# Patient Record
Sex: MALE | Race: WHITE | NOT HISPANIC OR LATINO | Employment: FULL TIME | URBAN - METROPOLITAN AREA
[De-identification: names, ages, dates, MRNs, and addresses within clinical notes are randomized per-mention and may not be internally consistent; named-entity substitution may affect disease eponyms.]

---

## 2017-02-17 ENCOUNTER — TELEPHONE (OUTPATIENT)
Dept: NEUROSURGERY | Facility: CLINIC | Age: 47
End: 2017-02-17

## 2017-03-16 ENCOUNTER — TELEPHONE (OUTPATIENT)
Dept: NEUROSURGERY | Facility: CLINIC | Age: 47
End: 2017-03-16

## 2017-03-17 ENCOUNTER — OFFICE VISIT (OUTPATIENT)
Dept: NEUROSURGERY | Facility: CLINIC | Age: 47
End: 2017-03-17

## 2017-03-17 VITALS
BODY MASS INDEX: 32.45 KG/M2 | OXYGEN SATURATION: 98 % | SYSTOLIC BLOOD PRESSURE: 122 MMHG | DIASTOLIC BLOOD PRESSURE: 80 MMHG | WEIGHT: 226.7 LBS | HEART RATE: 75 BPM | HEIGHT: 70 IN | TEMPERATURE: 98.1 F | RESPIRATION RATE: 18 BRPM

## 2017-03-17 DIAGNOSIS — M47.812 SPONDYLOSIS OF CERVICAL REGION WITHOUT MYELOPATHY OR RADICULOPATHY: ICD-10-CM

## 2017-03-17 DIAGNOSIS — G56.01 CARPAL TUNNEL SYNDROME OF RIGHT WRIST: Primary | ICD-10-CM

## 2017-03-17 PROCEDURE — 99213 OFFICE O/P EST LOW 20 MIN: CPT | Performed by: PHYSICIAN ASSISTANT

## 2017-03-17 RX ORDER — FLUTICASONE PROPIONATE 50 MCG
2 SPRAY, SUSPENSION (ML) NASAL AS NEEDED
COMMUNITY
End: 2020-12-22

## 2017-03-20 DIAGNOSIS — M47.812 SPONDYLOSIS OF CERVICAL REGION WITHOUT MYELOPATHY OR RADICULOPATHY: Primary | ICD-10-CM

## 2017-03-21 ENCOUNTER — OFFICE VISIT (OUTPATIENT)
Dept: NEUROSURGERY | Facility: CLINIC | Age: 47
End: 2017-03-21

## 2017-03-21 ENCOUNTER — HOSPITAL ENCOUNTER (OUTPATIENT)
Dept: INTERVENTIONAL RADIOLOGY/VASCULAR | Facility: HOSPITAL | Age: 47
Discharge: HOME | End: 2017-03-21
Attending: NEUROLOGICAL SURGERY | Admitting: NEUROLOGICAL SURGERY

## 2017-03-21 ENCOUNTER — APPOINTMENT (OUTPATIENT)
Dept: CT IMAGING | Facility: HOSPITAL | Age: 47
End: 2017-03-21

## 2017-03-21 VITALS — BODY MASS INDEX: 32.78 KG/M2 | HEIGHT: 70 IN | WEIGHT: 229 LBS | TEMPERATURE: 98 F

## 2017-03-21 VITALS
WEIGHT: 229 LBS | DIASTOLIC BLOOD PRESSURE: 88 MMHG | OXYGEN SATURATION: 97 % | HEIGHT: 70 IN | TEMPERATURE: 97.8 F | SYSTOLIC BLOOD PRESSURE: 138 MMHG | RESPIRATION RATE: 18 BRPM | BODY MASS INDEX: 32.78 KG/M2 | HEART RATE: 78 BPM

## 2017-03-21 DIAGNOSIS — M47.812 SPONDYLOSIS OF CERVICAL REGION WITHOUT MYELOPATHY OR RADICULOPATHY: ICD-10-CM

## 2017-03-21 DIAGNOSIS — M47.22 OSTEOARTHRITIS OF SPINE WITH RADICULOPATHY, CERVICAL REGION: Primary | ICD-10-CM

## 2017-03-21 PROCEDURE — 72240 MYELOGRAPHY NECK SPINE: CPT

## 2017-03-21 PROCEDURE — 0 IOPAMIDOL 61 % SOLUTION: Performed by: NEUROLOGICAL SURGERY

## 2017-03-21 PROCEDURE — 72125 CT NECK SPINE W/O DYE: CPT

## 2017-03-21 PROCEDURE — 99213 OFFICE O/P EST LOW 20 MIN: CPT | Performed by: NEUROLOGICAL SURGERY

## 2017-03-21 RX ORDER — ACETAMINOPHEN 500 MG
500 TABLET ORAL EVERY 4 HOURS PRN
Status: CANCELLED | OUTPATIENT
Start: 2017-03-21

## 2017-03-21 RX ORDER — MAGNESIUM HYDROXIDE/ALUMINUM HYDROXICE/SIMETHICONE 120; 1200; 1200 MG/30ML; MG/30ML; MG/30ML
30 SUSPENSION ORAL ONCE AS NEEDED
Status: CANCELLED | OUTPATIENT
Start: 2017-03-21

## 2017-03-21 RX ORDER — HYDROCODONE BITARTRATE AND ACETAMINOPHEN 10; 325 MG/1; MG/1
1 TABLET ORAL 2 TIMES DAILY PRN
Qty: 60 TABLET | Refills: 0 | Status: SHIPPED | OUTPATIENT
Start: 2017-03-21 | End: 2017-05-15

## 2017-03-21 RX ORDER — NAPROXEN SODIUM 220 MG
220 TABLET ORAL 2 TIMES DAILY PRN
COMMUNITY
End: 2020-12-22

## 2017-03-21 RX ORDER — PROMETHAZINE HYDROCHLORIDE 25 MG/ML
25 INJECTION, SOLUTION INTRAMUSCULAR; INTRAVENOUS ONCE AS NEEDED
Status: CANCELLED | OUTPATIENT
Start: 2017-03-21

## 2017-03-21 RX ORDER — ONDANSETRON 4 MG/1
4 TABLET, FILM COATED ORAL ONCE AS NEEDED
Status: CANCELLED | OUTPATIENT
Start: 2017-03-21

## 2017-03-21 RX ORDER — PROMETHAZINE HYDROCHLORIDE 25 MG/1
25 TABLET ORAL ONCE AS NEEDED
Status: CANCELLED | OUTPATIENT
Start: 2017-03-21

## 2017-03-21 RX ORDER — LIDOCAINE HYDROCHLORIDE 10 MG/ML
5 INJECTION, SOLUTION INFILTRATION; PERINEURAL ONCE
Status: COMPLETED | OUTPATIENT
Start: 2017-03-21 | End: 2017-03-21

## 2017-03-21 RX ADMIN — LIDOCAINE HYDROCHLORIDE 5 ML: 10 INJECTION, SOLUTION INFILTRATION; PERINEURAL at 07:41

## 2017-03-21 RX ADMIN — IOPAMIDOL 15 ML: 612 INJECTION, SOLUTION INTRATHECAL at 07:41

## 2017-03-22 ENCOUNTER — TELEPHONE (OUTPATIENT)
Dept: INFUSION THERAPY | Facility: HOSPITAL | Age: 47
End: 2017-03-22

## 2017-04-21 ENCOUNTER — OFFICE VISIT (OUTPATIENT)
Dept: NEUROSURGERY | Facility: CLINIC | Age: 47
End: 2017-04-21

## 2017-04-21 VITALS — BODY MASS INDEX: 32.64 KG/M2 | TEMPERATURE: 97.9 F | WEIGHT: 228 LBS | HEIGHT: 70 IN

## 2017-04-21 DIAGNOSIS — M47.22 CERVICAL SPONDYLOSIS WITH RADICULOPATHY: Primary | ICD-10-CM

## 2017-04-21 PROCEDURE — 99213 OFFICE O/P EST LOW 20 MIN: CPT | Performed by: NEUROLOGICAL SURGERY

## 2017-04-26 ENCOUNTER — TELEPHONE (OUTPATIENT)
Dept: NEUROSURGERY | Facility: CLINIC | Age: 47
End: 2017-04-26

## 2017-05-02 ENCOUNTER — DOCUMENTATION (OUTPATIENT)
Dept: NEUROSURGERY | Facility: CLINIC | Age: 47
End: 2017-05-02

## 2017-05-02 ENCOUNTER — PREP FOR SURGERY (OUTPATIENT)
Dept: OTHER | Facility: HOSPITAL | Age: 47
End: 2017-05-02

## 2017-05-02 DIAGNOSIS — M47.22 CERVICAL SPONDYLOSIS WITH RADICULOPATHY: Primary | ICD-10-CM

## 2017-05-02 RX ORDER — SODIUM CHLORIDE 0.9 % (FLUSH) 0.9 %
1-10 SYRINGE (ML) INJECTION AS NEEDED
Status: CANCELLED | OUTPATIENT
Start: 2017-05-02

## 2017-05-10 ENCOUNTER — TELEPHONE (OUTPATIENT)
Dept: NEUROSURGERY | Facility: CLINIC | Age: 47
End: 2017-05-10

## 2017-05-15 ENCOUNTER — APPOINTMENT (OUTPATIENT)
Dept: PREADMISSION TESTING | Facility: HOSPITAL | Age: 47
End: 2017-05-15

## 2017-05-15 VITALS — WEIGHT: 229.5 LBS | HEIGHT: 70 IN | BODY MASS INDEX: 32.86 KG/M2

## 2017-05-15 LAB
DEPRECATED RDW RBC AUTO: 49.2 FL (ref 37–54)
ERYTHROCYTE [DISTWIDTH] IN BLOOD BY AUTOMATED COUNT: 17.1 % (ref 11.3–14.5)
HBA1C MFR BLD: 5 % (ref 4.8–5.6)
HCT VFR BLD AUTO: 36.5 % (ref 38.9–50.9)
HGB BLD-MCNC: 11.3 G/DL (ref 13.1–17.5)
MCH RBC QN AUTO: 24.5 PG (ref 27–31)
MCHC RBC AUTO-ENTMCNC: 31 G/DL (ref 32–36)
MCV RBC AUTO: 79 FL (ref 80–99)
PLATELET # BLD AUTO: 248 10*3/MM3 (ref 150–450)
PMV BLD AUTO: 9.5 FL (ref 6–12)
RBC # BLD AUTO: 4.62 10*6/MM3 (ref 4.2–5.76)
WBC NRBC COR # BLD: 4.91 10*3/MM3 (ref 3.5–10.8)

## 2017-05-15 PROCEDURE — 93010 ELECTROCARDIOGRAM REPORT: CPT | Performed by: INTERNAL MEDICINE

## 2017-05-17 ENCOUNTER — ANESTHESIA EVENT (OUTPATIENT)
Dept: PERIOP | Facility: HOSPITAL | Age: 47
End: 2017-05-17

## 2017-05-17 LAB — MRSA SPEC QL CULT: NORMAL

## 2017-05-18 ENCOUNTER — HOSPITAL ENCOUNTER (INPATIENT)
Facility: HOSPITAL | Age: 47
LOS: 1 days | Discharge: HOME | End: 2017-05-19
Attending: NEUROLOGICAL SURGERY | Admitting: NEUROLOGICAL SURGERY

## 2017-05-18 ENCOUNTER — ANESTHESIA (OUTPATIENT)
Dept: PERIOP | Facility: HOSPITAL | Age: 47
End: 2017-05-18

## 2017-05-18 ENCOUNTER — APPOINTMENT (OUTPATIENT)
Dept: GENERAL RADIOLOGY | Facility: HOSPITAL | Age: 47
End: 2017-05-18

## 2017-05-18 DIAGNOSIS — M47.22 CERVICAL SPONDYLOSIS WITH RADICULOPATHY: ICD-10-CM

## 2017-05-18 PROCEDURE — 25010000002 HYDROMORPHONE PER 4 MG: Performed by: NURSE ANESTHETIST, CERTIFIED REGISTERED

## 2017-05-18 PROCEDURE — 25010000002 PROPOFOL 10 MG/ML EMULSION: Performed by: NURSE ANESTHETIST, CERTIFIED REGISTERED

## 2017-05-18 PROCEDURE — 25010000002 VANCOMYCIN: Performed by: NEUROLOGICAL SURGERY

## 2017-05-18 PROCEDURE — 25010000002 MORPHINE PER 10 MG: Performed by: NEUROLOGICAL SURGERY

## 2017-05-18 PROCEDURE — 22551 ARTHRD ANT NTRBDY CERVICAL: CPT | Performed by: NEUROLOGICAL SURGERY

## 2017-05-18 PROCEDURE — 25010000002 FENTANYL CITRATE (PF) 100 MCG/2ML SOLUTION: Performed by: NURSE ANESTHETIST, CERTIFIED REGISTERED

## 2017-05-18 PROCEDURE — 0RG20K0 FUSION OF 2 OR MORE CERVICAL VERTEBRAL JOINTS WITH NONAUTOLOGOUS TISSUE SUBSTITUTE, ANTERIOR APPROACH, ANTERIOR COLUMN, OPEN APPROACH: ICD-10-PCS | Performed by: NEUROLOGICAL SURGERY

## 2017-05-18 PROCEDURE — 0RB30ZZ EXCISION OF CERVICAL VERTEBRAL DISC, OPEN APPROACH: ICD-10-PCS | Performed by: NEUROLOGICAL SURGERY

## 2017-05-18 PROCEDURE — 25010000002 DEXAMETHASONE PER 1 MG: Performed by: NEUROLOGICAL SURGERY

## 2017-05-18 PROCEDURE — 25010000002 VANCOMYCIN HCL IN NACL 1.5-0.9 GM/500ML-% SOLUTION: Performed by: NEUROLOGICAL SURGERY

## 2017-05-18 PROCEDURE — 25010000002 MEPERIDINE PER 100 MG: Performed by: NURSE ANESTHETIST, CERTIFIED REGISTERED

## 2017-05-18 PROCEDURE — 22846 INSERT SPINE FIXATION DEVICE: CPT | Performed by: NEUROLOGICAL SURGERY

## 2017-05-18 PROCEDURE — 25010000002 ONDANSETRON PER 1 MG: Performed by: NURSE ANESTHETIST, CERTIFIED REGISTERED

## 2017-05-18 PROCEDURE — 22552 ARTHRD ANT NTRBD CERVICAL EA: CPT | Performed by: NEUROLOGICAL SURGERY

## 2017-05-18 PROCEDURE — 76001 HC FLUORO GREATER THAN 1 HOUR: CPT

## 2017-05-18 PROCEDURE — 25010000002 NEOSTIGMINE 10 MG/10ML SOLUTION: Performed by: NURSE ANESTHETIST, CERTIFIED REGISTERED

## 2017-05-18 PROCEDURE — 20931 SP BONE ALGRFT STRUCT ADD-ON: CPT | Performed by: NEUROLOGICAL SURGERY

## 2017-05-18 PROCEDURE — C1713 ANCHOR/SCREW BN/BN,TIS/BN: HCPCS | Performed by: NEUROLOGICAL SURGERY

## 2017-05-18 PROCEDURE — 76000 FLUOROSCOPY <1 HR PHYS/QHP: CPT

## 2017-05-18 DEVICE — BONE LORDOTIC ASR 5X14X11 FZD: Type: IMPLANTABLE DEVICE | Site: SPINE CERVICAL | Status: FUNCTIONAL

## 2017-05-18 DEVICE — SCREW 7723513 ZEVO VAR ST 3.5MM X 13MM
Type: IMPLANTABLE DEVICE | Site: SPINE CERVICAL | Status: FUNCTIONAL
Brand: ZEVO™ ANTERIOR CERVICAL PLATE SYSTEM

## 2017-05-18 DEVICE — BONE LORDOTIC ASR 6X14X11 FZD: Type: IMPLANTABLE DEVICE | Site: SPINE CERVICAL | Status: FUNCTIONAL

## 2017-05-18 RX ORDER — PROPOFOL 10 MG/ML
VIAL (ML) INTRAVENOUS AS NEEDED
Status: DISCONTINUED | OUTPATIENT
Start: 2017-05-18 | End: 2017-05-18 | Stop reason: SURG

## 2017-05-18 RX ORDER — IPRATROPIUM BROMIDE AND ALBUTEROL SULFATE 2.5; .5 MG/3ML; MG/3ML
3 SOLUTION RESPIRATORY (INHALATION) ONCE AS NEEDED
Status: DISCONTINUED | OUTPATIENT
Start: 2017-05-18 | End: 2017-05-18 | Stop reason: HOSPADM

## 2017-05-18 RX ORDER — PROMETHAZINE HYDROCHLORIDE 25 MG/ML
6.25 INJECTION, SOLUTION INTRAMUSCULAR; INTRAVENOUS ONCE AS NEEDED
Status: DISCONTINUED | OUTPATIENT
Start: 2017-05-18 | End: 2017-05-18 | Stop reason: HOSPADM

## 2017-05-18 RX ORDER — NALOXONE HCL 0.4 MG/ML
0.4 VIAL (ML) INJECTION AS NEEDED
Status: DISCONTINUED | OUTPATIENT
Start: 2017-05-18 | End: 2017-05-18 | Stop reason: HOSPADM

## 2017-05-18 RX ORDER — MORPHINE SULFATE 2 MG/ML
1 INJECTION, SOLUTION INTRAMUSCULAR; INTRAVENOUS EVERY 4 HOURS PRN
Status: DISCONTINUED | OUTPATIENT
Start: 2017-05-18 | End: 2017-05-19 | Stop reason: HOSPADM

## 2017-05-18 RX ORDER — ACETAMINOPHEN 325 MG/1
650 TABLET ORAL EVERY 4 HOURS PRN
Status: DISCONTINUED | OUTPATIENT
Start: 2017-05-18 | End: 2017-05-19 | Stop reason: HOSPADM

## 2017-05-18 RX ORDER — NALOXONE HCL 0.4 MG/ML
0.4 VIAL (ML) INJECTION
Status: DISCONTINUED | OUTPATIENT
Start: 2017-05-18 | End: 2017-05-19 | Stop reason: HOSPADM

## 2017-05-18 RX ORDER — BISACODYL 5 MG/1
5 TABLET, DELAYED RELEASE ORAL DAILY PRN
Status: DISCONTINUED | OUTPATIENT
Start: 2017-05-18 | End: 2017-05-19 | Stop reason: HOSPADM

## 2017-05-18 RX ORDER — PANTOPRAZOLE SODIUM 40 MG/1
40 TABLET, DELAYED RELEASE ORAL
Status: DISCONTINUED | OUTPATIENT
Start: 2017-05-19 | End: 2017-05-19 | Stop reason: HOSPADM

## 2017-05-18 RX ORDER — DIPHENHYDRAMINE HCL 25 MG
25 CAPSULE ORAL NIGHTLY PRN
Status: DISCONTINUED | OUTPATIENT
Start: 2017-05-18 | End: 2017-05-19 | Stop reason: HOSPADM

## 2017-05-18 RX ORDER — FENTANYL CITRATE 50 UG/ML
50 INJECTION, SOLUTION INTRAMUSCULAR; INTRAVENOUS
Status: DISCONTINUED | OUTPATIENT
Start: 2017-05-18 | End: 2017-05-18 | Stop reason: HOSPADM

## 2017-05-18 RX ORDER — ROCURONIUM BROMIDE 10 MG/ML
INJECTION, SOLUTION INTRAVENOUS AS NEEDED
Status: DISCONTINUED | OUTPATIENT
Start: 2017-05-18 | End: 2017-05-18 | Stop reason: SURG

## 2017-05-18 RX ORDER — FAMOTIDINE 20 MG/1
20 TABLET, FILM COATED ORAL ONCE
Status: COMPLETED | OUTPATIENT
Start: 2017-05-18 | End: 2017-05-18

## 2017-05-18 RX ORDER — HYDROMORPHONE HYDROCHLORIDE 1 MG/ML
0.5 INJECTION, SOLUTION INTRAMUSCULAR; INTRAVENOUS; SUBCUTANEOUS
Status: DISCONTINUED | OUTPATIENT
Start: 2017-05-18 | End: 2017-05-18 | Stop reason: HOSPADM

## 2017-05-18 RX ORDER — OXYCODONE AND ACETAMINOPHEN 7.5; 325 MG/1; MG/1
1 TABLET ORAL ONCE AS NEEDED
Status: DISCONTINUED | OUTPATIENT
Start: 2017-05-18 | End: 2017-05-18 | Stop reason: HOSPADM

## 2017-05-18 RX ORDER — HYDROCODONE BITARTRATE AND ACETAMINOPHEN 7.5; 325 MG/1; MG/1
1 TABLET ORAL EVERY 4 HOURS PRN
Status: DISCONTINUED | OUTPATIENT
Start: 2017-05-18 | End: 2017-05-19 | Stop reason: HOSPADM

## 2017-05-18 RX ORDER — MEPERIDINE HYDROCHLORIDE 25 MG/ML
12.5 INJECTION INTRAMUSCULAR; INTRAVENOUS; SUBCUTANEOUS
Status: DISCONTINUED | OUTPATIENT
Start: 2017-05-18 | End: 2017-05-18 | Stop reason: HOSPADM

## 2017-05-18 RX ORDER — SODIUM CHLORIDE 0.9 % (FLUSH) 0.9 %
1-10 SYRINGE (ML) INJECTION AS NEEDED
Status: DISCONTINUED | OUTPATIENT
Start: 2017-05-18 | End: 2017-05-18

## 2017-05-18 RX ORDER — FAMOTIDINE 10 MG/ML
20 INJECTION, SOLUTION INTRAVENOUS ONCE
Status: DISCONTINUED | OUTPATIENT
Start: 2017-05-18 | End: 2017-05-18

## 2017-05-18 RX ORDER — LIDOCAINE HYDROCHLORIDE 10 MG/ML
INJECTION, SOLUTION EPIDURAL; INFILTRATION; INTRACAUDAL; PERINEURAL AS NEEDED
Status: DISCONTINUED | OUTPATIENT
Start: 2017-05-18 | End: 2017-05-18 | Stop reason: SURG

## 2017-05-18 RX ORDER — SODIUM CHLORIDE 0.9 % (FLUSH) 0.9 %
1-10 SYRINGE (ML) INJECTION AS NEEDED
Status: DISCONTINUED | OUTPATIENT
Start: 2017-05-18 | End: 2017-05-19 | Stop reason: HOSPADM

## 2017-05-18 RX ORDER — BISACODYL 10 MG
10 SUPPOSITORY, RECTAL RECTAL DAILY PRN
Status: DISCONTINUED | OUTPATIENT
Start: 2017-05-18 | End: 2017-05-19 | Stop reason: HOSPADM

## 2017-05-18 RX ORDER — LIDOCAINE HYDROCHLORIDE 10 MG/ML
0.5 INJECTION, SOLUTION EPIDURAL; INFILTRATION; INTRACAUDAL; PERINEURAL ONCE AS NEEDED
Status: COMPLETED | OUTPATIENT
Start: 2017-05-18 | End: 2017-05-18

## 2017-05-18 RX ORDER — HYDRALAZINE HYDROCHLORIDE 20 MG/ML
5 INJECTION INTRAMUSCULAR; INTRAVENOUS
Status: DISCONTINUED | OUTPATIENT
Start: 2017-05-18 | End: 2017-05-18 | Stop reason: HOSPADM

## 2017-05-18 RX ORDER — SODIUM CHLORIDE 0.9 % (FLUSH) 0.9 %
1-10 SYRINGE (ML) INJECTION AS NEEDED
Status: DISCONTINUED | OUTPATIENT
Start: 2017-05-18 | End: 2017-05-18 | Stop reason: HOSPADM

## 2017-05-18 RX ORDER — ONDANSETRON 2 MG/ML
4 INJECTION INTRAMUSCULAR; INTRAVENOUS ONCE AS NEEDED
Status: DISCONTINUED | OUTPATIENT
Start: 2017-05-18 | End: 2017-05-18 | Stop reason: HOSPADM

## 2017-05-18 RX ORDER — VANCOMYCIN/0.9 % SOD CHLORIDE 1.5G/250ML
15 PLASTIC BAG, INJECTION (ML) INTRAVENOUS EVERY 12 HOURS
Status: COMPLETED | OUTPATIENT
Start: 2017-05-18 | End: 2017-05-18

## 2017-05-18 RX ORDER — DOCUSATE SODIUM 100 MG/1
100 CAPSULE, LIQUID FILLED ORAL 2 TIMES DAILY PRN
Status: DISCONTINUED | OUTPATIENT
Start: 2017-05-18 | End: 2017-05-19 | Stop reason: HOSPADM

## 2017-05-18 RX ORDER — MAGNESIUM HYDROXIDE 1200 MG/15ML
LIQUID ORAL AS NEEDED
Status: DISCONTINUED | OUTPATIENT
Start: 2017-05-18 | End: 2017-05-18 | Stop reason: HOSPADM

## 2017-05-18 RX ORDER — NEOSTIGMINE METHYLSULFATE 1 MG/ML
INJECTION, SOLUTION INTRAVENOUS AS NEEDED
Status: DISCONTINUED | OUTPATIENT
Start: 2017-05-18 | End: 2017-05-18 | Stop reason: SURG

## 2017-05-18 RX ORDER — FENTANYL CITRATE 50 UG/ML
INJECTION, SOLUTION INTRAMUSCULAR; INTRAVENOUS AS NEEDED
Status: DISCONTINUED | OUTPATIENT
Start: 2017-05-18 | End: 2017-05-18 | Stop reason: SURG

## 2017-05-18 RX ORDER — PROMETHAZINE HYDROCHLORIDE 25 MG/1
25 SUPPOSITORY RECTAL ONCE AS NEEDED
Status: DISCONTINUED | OUTPATIENT
Start: 2017-05-18 | End: 2017-05-18 | Stop reason: HOSPADM

## 2017-05-18 RX ORDER — SODIUM CHLORIDE, SODIUM LACTATE, POTASSIUM CHLORIDE, CALCIUM CHLORIDE 600; 310; 30; 20 MG/100ML; MG/100ML; MG/100ML; MG/100ML
75 INJECTION, SOLUTION INTRAVENOUS CONTINUOUS
Status: DISCONTINUED | OUTPATIENT
Start: 2017-05-18 | End: 2017-05-19 | Stop reason: HOSPADM

## 2017-05-18 RX ORDER — ONDANSETRON 2 MG/ML
4 INJECTION INTRAMUSCULAR; INTRAVENOUS EVERY 6 HOURS PRN
Status: DISCONTINUED | OUTPATIENT
Start: 2017-05-18 | End: 2017-05-19 | Stop reason: HOSPADM

## 2017-05-18 RX ORDER — SENNA AND DOCUSATE SODIUM 50; 8.6 MG/1; MG/1
1 TABLET, FILM COATED ORAL NIGHTLY PRN
Status: DISCONTINUED | OUTPATIENT
Start: 2017-05-18 | End: 2017-05-19 | Stop reason: HOSPADM

## 2017-05-18 RX ORDER — SODIUM CHLORIDE, SODIUM LACTATE, POTASSIUM CHLORIDE, CALCIUM CHLORIDE 600; 310; 30; 20 MG/100ML; MG/100ML; MG/100ML; MG/100ML
9 INJECTION, SOLUTION INTRAVENOUS CONTINUOUS
Status: DISCONTINUED | OUTPATIENT
Start: 2017-05-18 | End: 2017-05-19 | Stop reason: HOSPADM

## 2017-05-18 RX ORDER — ONDANSETRON 2 MG/ML
INJECTION INTRAMUSCULAR; INTRAVENOUS AS NEEDED
Status: DISCONTINUED | OUTPATIENT
Start: 2017-05-18 | End: 2017-05-18 | Stop reason: SURG

## 2017-05-18 RX ORDER — PROMETHAZINE HYDROCHLORIDE 25 MG/1
25 TABLET ORAL ONCE AS NEEDED
Status: DISCONTINUED | OUTPATIENT
Start: 2017-05-18 | End: 2017-05-18 | Stop reason: HOSPADM

## 2017-05-18 RX ORDER — FLUTICASONE PROPIONATE 50 MCG
2 SPRAY, SUSPENSION (ML) NASAL AS NEEDED
Status: DISCONTINUED | OUTPATIENT
Start: 2017-05-18 | End: 2017-05-19 | Stop reason: HOSPADM

## 2017-05-18 RX ORDER — IBUPROFEN 600 MG/1
600 TABLET ORAL EVERY 8 HOURS SCHEDULED
Status: DISCONTINUED | OUTPATIENT
Start: 2017-05-18 | End: 2017-05-19 | Stop reason: HOSPADM

## 2017-05-18 RX ORDER — OXYCODONE HYDROCHLORIDE AND ACETAMINOPHEN 5; 325 MG/1; MG/1
1 TABLET ORAL ONCE AS NEEDED
Status: DISCONTINUED | OUTPATIENT
Start: 2017-05-18 | End: 2017-05-18 | Stop reason: HOSPADM

## 2017-05-18 RX ORDER — GLYCOPYRROLATE 0.2 MG/ML
INJECTION INTRAMUSCULAR; INTRAVENOUS AS NEEDED
Status: DISCONTINUED | OUTPATIENT
Start: 2017-05-18 | End: 2017-05-18 | Stop reason: SURG

## 2017-05-18 RX ORDER — MORPHINE SULFATE 4 MG/ML
4 INJECTION, SOLUTION INTRAMUSCULAR; INTRAVENOUS EVERY 4 HOURS PRN
Status: DISCONTINUED | OUTPATIENT
Start: 2017-05-18 | End: 2017-05-19 | Stop reason: HOSPADM

## 2017-05-18 RX ORDER — LABETALOL HYDROCHLORIDE 5 MG/ML
5 INJECTION, SOLUTION INTRAVENOUS
Status: DISCONTINUED | OUTPATIENT
Start: 2017-05-18 | End: 2017-05-18 | Stop reason: HOSPADM

## 2017-05-18 RX ADMIN — DEXAMETHASONE SODIUM PHOSPHATE 10 MG: 4 INJECTION, SOLUTION INTRAMUSCULAR; INTRAVENOUS at 07:24

## 2017-05-18 RX ADMIN — ROCURONIUM BROMIDE 45 MG: 10 INJECTION INTRAVENOUS at 07:24

## 2017-05-18 RX ADMIN — FENTANYL CITRATE 50 MCG: 50 INJECTION, SOLUTION INTRAMUSCULAR; INTRAVENOUS at 09:50

## 2017-05-18 RX ADMIN — ROBINUL 0.2 MG: 0.2 INJECTION INTRAMUSCULAR; INTRAVENOUS at 10:10

## 2017-05-18 RX ADMIN — VANCOMYCIN HYDROCHLORIDE 1500 MG: 1 INJECTION, POWDER, LYOPHILIZED, FOR SOLUTION INTRAVENOUS at 19:07

## 2017-05-18 RX ADMIN — SODIUM CHLORIDE, POTASSIUM CHLORIDE, SODIUM LACTATE AND CALCIUM CHLORIDE 75 ML/HR: 600; 310; 30; 20 INJECTION, SOLUTION INTRAVENOUS at 19:06

## 2017-05-18 RX ADMIN — HYDROCODONE BITARTRATE AND ACETAMINOPHEN 1 TABLET: 7.5; 325 TABLET ORAL at 16:32

## 2017-05-18 RX ADMIN — SODIUM CHLORIDE, POTASSIUM CHLORIDE, SODIUM LACTATE AND CALCIUM CHLORIDE 9 ML/HR: 600; 310; 30; 20 INJECTION, SOLUTION INTRAVENOUS at 06:10

## 2017-05-18 RX ADMIN — HYDROCODONE BITARTRATE AND ACETAMINOPHEN 1 TABLET: 7.5; 325 TABLET ORAL at 11:30

## 2017-05-18 RX ADMIN — FENTANYL CITRATE 50 MCG: 50 INJECTION INTRAMUSCULAR; INTRAVENOUS at 11:55

## 2017-05-18 RX ADMIN — FENTANYL CITRATE 50 MCG: 50 INJECTION, SOLUTION INTRAMUSCULAR; INTRAVENOUS at 07:24

## 2017-05-18 RX ADMIN — LIDOCAINE HYDROCHLORIDE 0.5 ML: 10 INJECTION, SOLUTION EPIDURAL; INFILTRATION; INTRACAUDAL; PERINEURAL at 06:10

## 2017-05-18 RX ADMIN — ONDANSETRON 4 MG: 2 INJECTION INTRAMUSCULAR; INTRAVENOUS at 10:10

## 2017-05-18 RX ADMIN — MEPERIDINE HYDROCHLORIDE 12.5 MG: 25 INJECTION, SOLUTION INTRAMUSCULAR; INTRAVENOUS; SUBCUTANEOUS at 10:35

## 2017-05-18 RX ADMIN — HYDROMORPHONE HYDROCHLORIDE 0.5 MG: 1 INJECTION, SOLUTION INTRAMUSCULAR; INTRAVENOUS; SUBCUTANEOUS at 10:55

## 2017-05-18 RX ADMIN — SODIUM CHLORIDE, POTASSIUM CHLORIDE, SODIUM LACTATE AND CALCIUM CHLORIDE 75 ML/HR: 600; 310; 30; 20 INJECTION, SOLUTION INTRAVENOUS at 10:43

## 2017-05-18 RX ADMIN — FENTANYL CITRATE 50 MCG: 50 INJECTION INTRAMUSCULAR; INTRAVENOUS at 14:15

## 2017-05-18 RX ADMIN — IBUPROFEN 600 MG: 600 TABLET, FILM COATED ORAL at 16:32

## 2017-05-18 RX ADMIN — NEOSTIGMINE METHYLSULFATE 1 MG: 1 INJECTION, SOLUTION INTRAVENOUS at 10:10

## 2017-05-18 RX ADMIN — PROPOFOL 160 MG: 10 INJECTION, EMULSION INTRAVENOUS at 07:24

## 2017-05-18 RX ADMIN — HYDROMORPHONE HYDROCHLORIDE 0.5 MG: 1 INJECTION, SOLUTION INTRAMUSCULAR; INTRAVENOUS; SUBCUTANEOUS at 10:45

## 2017-05-18 RX ADMIN — FENTANYL CITRATE 50 MCG: 50 INJECTION, SOLUTION INTRAMUSCULAR; INTRAVENOUS at 08:05

## 2017-05-18 RX ADMIN — HYDROCODONE BITARTRATE AND ACETAMINOPHEN 1 TABLET: 7.5; 325 TABLET ORAL at 20:22

## 2017-05-18 RX ADMIN — HYDROMORPHONE HYDROCHLORIDE 0.5 MG: 1 INJECTION, SOLUTION INTRAMUSCULAR; INTRAVENOUS; SUBCUTANEOUS at 11:15

## 2017-05-18 RX ADMIN — IBUPROFEN 600 MG: 600 TABLET, FILM COATED ORAL at 20:22

## 2017-05-18 RX ADMIN — MORPHINE SULFATE 4 MG: 4 INJECTION, SOLUTION INTRAMUSCULAR; INTRAVENOUS at 16:32

## 2017-05-18 RX ADMIN — VANCOMYCIN HYDROCHLORIDE 1500 MG: 1 INJECTION, POWDER, LYOPHILIZED, FOR SOLUTION INTRAVENOUS at 06:50

## 2017-05-18 RX ADMIN — FENTANYL CITRATE 50 MCG: 50 INJECTION INTRAMUSCULAR; INTRAVENOUS at 11:40

## 2017-05-18 RX ADMIN — LIDOCAINE HYDROCHLORIDE 40 MG: 10 INJECTION, SOLUTION EPIDURAL; INFILTRATION; INTRACAUDAL; PERINEURAL at 07:24

## 2017-05-18 RX ADMIN — HYDROMORPHONE HYDROCHLORIDE 0.5 MG: 1 INJECTION, SOLUTION INTRAMUSCULAR; INTRAVENOUS; SUBCUTANEOUS at 11:05

## 2017-05-18 RX ADMIN — FAMOTIDINE 20 MG: 20 TABLET ORAL at 06:09

## 2017-05-19 VITALS
HEART RATE: 68 BPM | RESPIRATION RATE: 16 BRPM | HEIGHT: 70 IN | SYSTOLIC BLOOD PRESSURE: 141 MMHG | BODY MASS INDEX: 32.78 KG/M2 | DIASTOLIC BLOOD PRESSURE: 93 MMHG | TEMPERATURE: 97.8 F | WEIGHT: 229 LBS | OXYGEN SATURATION: 97 %

## 2017-05-19 DIAGNOSIS — Z98.1 STATUS POST CERVICAL SPINAL FUSION: Primary | ICD-10-CM

## 2017-05-19 PROCEDURE — 99024 POSTOP FOLLOW-UP VISIT: CPT | Performed by: PHYSICIAN ASSISTANT

## 2017-05-19 RX ORDER — HYDROCODONE BITARTRATE AND ACETAMINOPHEN 10; 325 MG/1; MG/1
1 TABLET ORAL 3 TIMES DAILY PRN
Qty: 50 TABLET | Refills: 0 | Status: SHIPPED | OUTPATIENT
Start: 2017-05-19 | End: 2017-06-09

## 2017-05-19 RX ADMIN — IBUPROFEN 600 MG: 600 TABLET, FILM COATED ORAL at 05:19

## 2017-05-19 RX ADMIN — PANTOPRAZOLE SODIUM 40 MG: 40 TABLET, DELAYED RELEASE ORAL at 05:19

## 2017-06-05 ENCOUNTER — TELEPHONE (OUTPATIENT)
Dept: NEUROSURGERY | Facility: CLINIC | Age: 47
End: 2017-06-05

## 2017-06-09 ENCOUNTER — TRANSCRIBE ORDERS (OUTPATIENT)
Dept: GENERAL RADIOLOGY | Facility: HOSPITAL | Age: 47
End: 2017-06-09

## 2017-06-09 ENCOUNTER — OFFICE VISIT (OUTPATIENT)
Dept: NEUROSURGERY | Facility: CLINIC | Age: 47
End: 2017-06-09

## 2017-06-09 ENCOUNTER — APPOINTMENT (OUTPATIENT)
Dept: GENERAL RADIOLOGY | Facility: HOSPITAL | Age: 47
End: 2017-06-09

## 2017-06-09 ENCOUNTER — TELEPHONE (OUTPATIENT)
Dept: NEUROSURGERY | Facility: CLINIC | Age: 47
End: 2017-06-09

## 2017-06-09 ENCOUNTER — HOSPITAL ENCOUNTER (OUTPATIENT)
Dept: GENERAL RADIOLOGY | Facility: HOSPITAL | Age: 47
Discharge: HOME | End: 2017-06-09
Admitting: PHYSICIAN ASSISTANT

## 2017-06-09 VITALS — HEIGHT: 70 IN | TEMPERATURE: 98.4 F | WEIGHT: 227 LBS | BODY MASS INDEX: 32.5 KG/M2

## 2017-06-09 DIAGNOSIS — Z98.1 STATUS POST CERVICAL SPINAL FUSION: Primary | ICD-10-CM

## 2017-06-09 DIAGNOSIS — Z98.1 S/P SPINAL FUSION: Primary | ICD-10-CM

## 2017-06-09 DIAGNOSIS — M47.22 CERVICAL SPONDYLOSIS WITH RADICULOPATHY: Primary | ICD-10-CM

## 2017-06-09 DIAGNOSIS — Z98.1 STATUS POST CERVICAL SPINAL FUSION: ICD-10-CM

## 2017-06-09 DIAGNOSIS — Z98.1 S/P SPINAL FUSION: ICD-10-CM

## 2017-06-09 PROCEDURE — 72040 X-RAY EXAM NECK SPINE 2-3 VW: CPT

## 2017-06-09 PROCEDURE — 99024 POSTOP FOLLOW-UP VISIT: CPT | Performed by: PHYSICIAN ASSISTANT

## 2017-07-21 ENCOUNTER — OFFICE VISIT (OUTPATIENT)
Dept: NEUROSURGERY | Facility: CLINIC | Age: 47
End: 2017-07-21

## 2017-07-21 VITALS — WEIGHT: 227 LBS | BODY MASS INDEX: 32.5 KG/M2 | TEMPERATURE: 97.6 F | HEIGHT: 70 IN

## 2017-07-21 DIAGNOSIS — M47.22 CERVICAL SPONDYLOSIS WITH RADICULOPATHY: ICD-10-CM

## 2017-07-21 DIAGNOSIS — Z98.1 STATUS POST CERVICAL SPINAL FUSION: Primary | ICD-10-CM

## 2017-07-21 PROCEDURE — 99024 POSTOP FOLLOW-UP VISIT: CPT | Performed by: NEUROLOGICAL SURGERY

## 2020-11-15 ENCOUNTER — APPOINTMENT (OUTPATIENT)
Dept: PREADMISSION TESTING | Facility: HOSPITAL | Age: 50
End: 2020-11-15

## 2020-12-22 ENCOUNTER — OFFICE VISIT (OUTPATIENT)
Dept: NEUROSURGERY | Facility: CLINIC | Age: 50
End: 2020-12-22

## 2020-12-22 VITALS — HEIGHT: 70 IN | BODY MASS INDEX: 34.36 KG/M2 | WEIGHT: 240 LBS | TEMPERATURE: 98 F | RESPIRATION RATE: 16 BRPM

## 2020-12-22 DIAGNOSIS — R20.0 NUMBNESS OF RIGHT HAND: ICD-10-CM

## 2020-12-22 DIAGNOSIS — G56.01 CARPAL TUNNEL SYNDROME OF RIGHT WRIST: Primary | ICD-10-CM

## 2020-12-22 DIAGNOSIS — M47.22 CERVICAL SPONDYLOSIS WITH RADICULOPATHY: ICD-10-CM

## 2020-12-22 DIAGNOSIS — R20.0 BILATERAL LEG NUMBNESS: ICD-10-CM

## 2020-12-22 DIAGNOSIS — Z98.1 STATUS POST CERVICAL SPINAL FUSION: ICD-10-CM

## 2020-12-22 PROCEDURE — 99204 OFFICE O/P NEW MOD 45 MIN: CPT | Performed by: PHYSICIAN ASSISTANT

## 2020-12-26 ENCOUNTER — HOSPITAL ENCOUNTER (OUTPATIENT)
Dept: MRI IMAGING | Facility: HOSPITAL | Age: 50
Discharge: HOME | End: 2020-12-26
Admitting: PHYSICIAN ASSISTANT

## 2020-12-26 DIAGNOSIS — M47.22 CERVICAL SPONDYLOSIS WITH RADICULOPATHY: ICD-10-CM

## 2020-12-26 DIAGNOSIS — Z98.1 STATUS POST CERVICAL SPINAL FUSION: ICD-10-CM

## 2020-12-26 PROCEDURE — 72148 MRI LUMBAR SPINE W/O DYE: CPT

## 2021-01-06 ENCOUNTER — OFFICE VISIT (OUTPATIENT)
Dept: NEUROSURGERY | Facility: CLINIC | Age: 51
End: 2021-01-06

## 2021-01-06 VITALS
DIASTOLIC BLOOD PRESSURE: 86 MMHG | BODY MASS INDEX: 33.64 KG/M2 | HEIGHT: 70 IN | SYSTOLIC BLOOD PRESSURE: 142 MMHG | TEMPERATURE: 97.7 F | WEIGHT: 235 LBS

## 2021-01-06 DIAGNOSIS — R20.0 BILATERAL LEG NUMBNESS: ICD-10-CM

## 2021-01-06 DIAGNOSIS — G56.01 CARPAL TUNNEL SYNDROME OF RIGHT WRIST: ICD-10-CM

## 2021-01-06 DIAGNOSIS — Z98.1 STATUS POST CERVICAL SPINAL FUSION: ICD-10-CM

## 2021-01-06 DIAGNOSIS — M48.062 SPINAL STENOSIS, LUMBAR REGION, WITH NEUROGENIC CLAUDICATION: Primary | ICD-10-CM

## 2021-01-06 DIAGNOSIS — R20.0 NUMBNESS OF RIGHT HAND: ICD-10-CM

## 2021-01-06 DIAGNOSIS — M47.22 CERVICAL SPONDYLOSIS WITH RADICULOPATHY: ICD-10-CM

## 2021-01-06 PROCEDURE — 99214 OFFICE O/P EST MOD 30 MIN: CPT | Performed by: PHYSICIAN ASSISTANT

## 2021-01-11 ENCOUNTER — TELEPHONE (OUTPATIENT)
Dept: NEUROSURGERY | Facility: CLINIC | Age: 51
End: 2021-01-11

## 2021-01-26 ENCOUNTER — HOSPITAL ENCOUNTER (OUTPATIENT)
Dept: CT IMAGING | Facility: HOSPITAL | Age: 51
Discharge: HOME | End: 2021-01-26

## 2021-01-26 ENCOUNTER — HOSPITAL ENCOUNTER (OUTPATIENT)
Dept: GENERAL RADIOLOGY | Facility: HOSPITAL | Age: 51
Discharge: HOME | End: 2021-01-26

## 2021-01-26 VITALS
TEMPERATURE: 96.8 F | OXYGEN SATURATION: 95 % | RESPIRATION RATE: 18 BRPM | HEART RATE: 89 BPM | DIASTOLIC BLOOD PRESSURE: 72 MMHG | WEIGHT: 236 LBS | BODY MASS INDEX: 33.86 KG/M2 | SYSTOLIC BLOOD PRESSURE: 133 MMHG

## 2021-01-26 DIAGNOSIS — M48.062 SPINAL STENOSIS, LUMBAR REGION WITH NEUROGENIC CLAUDICATION: ICD-10-CM

## 2021-01-26 DIAGNOSIS — M48.062 SPINAL STENOSIS, LUMBAR REGION, WITH NEUROGENIC CLAUDICATION: ICD-10-CM

## 2021-01-26 PROCEDURE — 62304 MYELOGRAPHY LUMBAR INJECTION: CPT

## 2021-01-26 PROCEDURE — 62284 INJECTION FOR MYELOGRAM: CPT | Performed by: NEUROLOGICAL SURGERY

## 2021-01-26 PROCEDURE — 72120 X-RAY BEND ONLY L-S SPINE: CPT

## 2021-01-26 PROCEDURE — 0 IOPAMIDOL 41 % SOLUTION: Performed by: PHYSICIAN ASSISTANT

## 2021-01-26 PROCEDURE — 72240 MYELOGRAPHY NECK SPINE: CPT

## 2021-01-26 PROCEDURE — 72132 CT LUMBAR SPINE W/DYE: CPT

## 2021-01-26 RX ORDER — PROMETHAZINE HYDROCHLORIDE 25 MG/1
25 TABLET ORAL ONCE AS NEEDED
Status: DISCONTINUED | OUTPATIENT
Start: 2021-01-26 | End: 2021-01-27 | Stop reason: HOSPADM

## 2021-01-26 RX ORDER — LIDOCAINE HYDROCHLORIDE 10 MG/ML
5 INJECTION, SOLUTION EPIDURAL; INFILTRATION; INTRACAUDAL; PERINEURAL ONCE
Status: COMPLETED | OUTPATIENT
Start: 2021-01-26 | End: 2021-01-26

## 2021-01-26 RX ORDER — ONDANSETRON 4 MG/1
4 TABLET, FILM COATED ORAL ONCE AS NEEDED
Status: DISCONTINUED | OUTPATIENT
Start: 2021-01-26 | End: 2021-01-27 | Stop reason: HOSPADM

## 2021-01-26 RX ADMIN — IOPAMIDOL 20 ML: 408 INJECTION, SOLUTION INTRATHECAL at 07:06

## 2021-01-26 RX ADMIN — LIDOCAINE HYDROCHLORIDE 5 ML: 10 INJECTION, SOLUTION EPIDURAL; INFILTRATION; INTRACAUDAL; PERINEURAL at 07:01

## 2021-01-27 ENCOUNTER — TELEPHONE (OUTPATIENT)
Dept: INFUSION THERAPY | Facility: HOSPITAL | Age: 51
End: 2021-01-27

## 2021-01-27 ENCOUNTER — OFFICE VISIT (OUTPATIENT)
Dept: NEUROSURGERY | Facility: CLINIC | Age: 51
End: 2021-01-27

## 2021-01-27 VITALS — WEIGHT: 240.2 LBS | HEART RATE: 97 BPM | BODY MASS INDEX: 34.39 KG/M2 | HEIGHT: 70 IN

## 2021-01-27 DIAGNOSIS — G56.03 BILATERAL CARPAL TUNNEL SYNDROME: ICD-10-CM

## 2021-01-27 DIAGNOSIS — M47.22 CERVICAL SPONDYLOSIS WITH RADICULOPATHY: ICD-10-CM

## 2021-01-27 DIAGNOSIS — M48.062 SPINAL STENOSIS, LUMBAR REGION, WITH NEUROGENIC CLAUDICATION: Primary | ICD-10-CM

## 2021-01-27 PROCEDURE — 99214 OFFICE O/P EST MOD 30 MIN: CPT | Performed by: NEUROLOGICAL SURGERY

## 2021-01-29 ENCOUNTER — TELEPHONE (OUTPATIENT)
Dept: NEUROSURGERY | Facility: CLINIC | Age: 51
End: 2021-01-29

## 2021-01-29 ENCOUNTER — PREP FOR SURGERY (OUTPATIENT)
Dept: OTHER | Facility: HOSPITAL | Age: 51
End: 2021-01-29

## 2021-01-29 DIAGNOSIS — M48.062 LUMBAR STENOSIS WITH NEUROGENIC CLAUDICATION: Primary | ICD-10-CM

## 2021-01-29 RX ORDER — IBUPROFEN 800 MG/1
800 TABLET ORAL ONCE
Status: CANCELLED | OUTPATIENT
Start: 2021-01-29 | End: 2021-01-29

## 2021-01-29 RX ORDER — ACETAMINOPHEN 500 MG
1000 TABLET ORAL ONCE
Status: CANCELLED | OUTPATIENT
Start: 2021-01-29 | End: 2021-01-29

## 2021-01-29 RX ORDER — OXYCODONE HCL 10 MG/1
10 TABLET, FILM COATED, EXTENDED RELEASE ORAL ONCE
Status: CANCELLED | OUTPATIENT
Start: 2021-01-29 | End: 2021-01-29

## 2021-01-29 RX ORDER — FAMOTIDINE 20 MG/1
20 TABLET, FILM COATED ORAL
Status: CANCELLED | OUTPATIENT
Start: 2021-01-29

## 2021-02-03 ENCOUNTER — TELEPHONE (OUTPATIENT)
Dept: NEUROSURGERY | Facility: CLINIC | Age: 51
End: 2021-02-03

## 2021-02-08 ENCOUNTER — TELEPHONE (OUTPATIENT)
Dept: NEUROSURGERY | Facility: CLINIC | Age: 51
End: 2021-02-08

## 2021-02-09 ENCOUNTER — APPOINTMENT (OUTPATIENT)
Dept: PREADMISSION TESTING | Facility: HOSPITAL | Age: 51
End: 2021-02-09

## 2021-02-09 VITALS — WEIGHT: 239.8 LBS | HEIGHT: 70 IN | BODY MASS INDEX: 34.33 KG/M2

## 2021-02-09 LAB
DEPRECATED RDW RBC AUTO: 46.1 FL (ref 37–54)
ERYTHROCYTE [DISTWIDTH] IN BLOOD BY AUTOMATED COUNT: 13.5 % (ref 12.3–15.4)
HBA1C MFR BLD: 4.9 % (ref 4.8–5.6)
HCT VFR BLD AUTO: 43.6 % (ref 37.5–51)
HGB BLD-MCNC: 13.8 G/DL (ref 13–17.7)
MCH RBC QN AUTO: 29.2 PG (ref 26.6–33)
MCHC RBC AUTO-ENTMCNC: 31.7 G/DL (ref 31.5–35.7)
MCV RBC AUTO: 92.2 FL (ref 79–97)
MRSA DNA SPEC QL NAA+PROBE: NEGATIVE
PLATELET # BLD AUTO: 156 10*3/MM3 (ref 140–450)
PMV BLD AUTO: 11 FL (ref 6–12)
QT INTERVAL: 394 MS
QTC INTERVAL: 422 MS
RBC # BLD AUTO: 4.73 10*6/MM3 (ref 4.14–5.8)
SARS-COV-2 RNA RESP QL NAA+PROBE: NOT DETECTED
WBC # BLD AUTO: 5.35 10*3/MM3 (ref 3.4–10.8)

## 2021-02-09 PROCEDURE — 83036 HEMOGLOBIN GLYCOSYLATED A1C: CPT

## 2021-02-09 PROCEDURE — 87641 MR-STAPH DNA AMP PROBE: CPT

## 2021-02-09 PROCEDURE — U0004 COV-19 TEST NON-CDC HGH THRU: HCPCS

## 2021-02-09 PROCEDURE — C9803 HOPD COVID-19 SPEC COLLECT: HCPCS

## 2021-02-09 PROCEDURE — 36415 COLL VENOUS BLD VENIPUNCTURE: CPT

## 2021-02-09 PROCEDURE — 85027 COMPLETE CBC AUTOMATED: CPT

## 2021-02-09 PROCEDURE — 93010 ELECTROCARDIOGRAM REPORT: CPT | Performed by: INTERNAL MEDICINE

## 2021-02-09 PROCEDURE — 93005 ELECTROCARDIOGRAM TRACING: CPT

## 2021-02-11 ENCOUNTER — ANESTHESIA EVENT (OUTPATIENT)
Dept: PERIOP | Facility: HOSPITAL | Age: 51
End: 2021-02-11

## 2021-02-11 ENCOUNTER — APPOINTMENT (OUTPATIENT)
Dept: GENERAL RADIOLOGY | Facility: HOSPITAL | Age: 51
End: 2021-02-11

## 2021-02-11 ENCOUNTER — ANESTHESIA (OUTPATIENT)
Dept: PERIOP | Facility: HOSPITAL | Age: 51
End: 2021-02-11

## 2021-02-11 ENCOUNTER — HOSPITAL ENCOUNTER (INPATIENT)
Facility: HOSPITAL | Age: 51
LOS: 2 days | Discharge: HOME | End: 2021-02-13
Attending: NEUROLOGICAL SURGERY | Admitting: NEUROLOGICAL SURGERY

## 2021-02-11 DIAGNOSIS — M48.062 LUMBAR STENOSIS WITH NEUROGENIC CLAUDICATION: ICD-10-CM

## 2021-02-11 PROCEDURE — 76000 FLUOROSCOPY <1 HR PHYS/QHP: CPT

## 2021-02-11 PROCEDURE — 25010000002 FENTANYL CITRATE (PF) 100 MCG/2ML SOLUTION: Performed by: NURSE ANESTHETIST, CERTIFIED REGISTERED

## 2021-02-11 PROCEDURE — 25010000002 MORPHINE PER 10 MG: Performed by: NEUROLOGICAL SURGERY

## 2021-02-11 PROCEDURE — 25010000002 NEOSTIGMINE 10 MG/10ML SOLUTION: Performed by: NURSE ANESTHETIST, CERTIFIED REGISTERED

## 2021-02-11 PROCEDURE — 63048 LAM FACETEC &FORAMOT EA ADDL: CPT | Performed by: PHYSICIAN ASSISTANT

## 2021-02-11 PROCEDURE — 00Q20ZZ REPAIR DURA MATER, OPEN APPROACH: ICD-10-PCS | Performed by: NEUROLOGICAL SURGERY

## 2021-02-11 PROCEDURE — 63047 LAM FACETEC & FORAMOT LUMBAR: CPT | Performed by: PHYSICIAN ASSISTANT

## 2021-02-11 PROCEDURE — 25010000002 ONDANSETRON PER 1 MG: Performed by: NURSE ANESTHETIST, CERTIFIED REGISTERED

## 2021-02-11 PROCEDURE — 25010000002 PROPOFOL 10 MG/ML EMULSION: Performed by: NURSE ANESTHETIST, CERTIFIED REGISTERED

## 2021-02-11 PROCEDURE — 25010000002 VANCOMYCIN 10 G RECONSTITUTED SOLUTION: Performed by: NEUROLOGICAL SURGERY

## 2021-02-11 PROCEDURE — 63047 LAM FACETEC & FORAMOT LUMBAR: CPT | Performed by: NEUROLOGICAL SURGERY

## 2021-02-11 PROCEDURE — 01NB0ZZ RELEASE LUMBAR NERVE, OPEN APPROACH: ICD-10-PCS | Performed by: NEUROLOGICAL SURGERY

## 2021-02-11 PROCEDURE — 63048 LAM FACETEC &FORAMOT EA ADDL: CPT | Performed by: NEUROLOGICAL SURGERY

## 2021-02-11 PROCEDURE — 25010000002 DEXAMETHASONE PER 1 MG: Performed by: NURSE ANESTHETIST, CERTIFIED REGISTERED

## 2021-02-11 DEVICE — FLOSEAL HEMOSTATIC MATRIX, 10ML
Type: IMPLANTABLE DEVICE | Site: SPINE LUMBAR | Status: FUNCTIONAL
Brand: FLOSEAL HEMOSTATIC MATRIX

## 2021-02-11 DEVICE — DURAGEN® PLUS DURAL REGENERATION MATRIX, 2 IN X 2 IN (5 CM X 5 CM)
Type: IMPLANTABLE DEVICE | Site: SPINE LUMBAR | Status: FUNCTIONAL
Brand: DURAGEN® PLUS

## 2021-02-11 DEVICE — IMPLANTABLE DEVICE
Type: IMPLANTABLE DEVICE | Site: SPINE LUMBAR | Status: FUNCTIONAL
Brand: SURGIFOAM® ABSORBABLE GELATIN SPONGE, U.S.P.

## 2021-02-11 DEVICE — BONE WAX
Type: IMPLANTABLE DEVICE | Site: SPINE LUMBAR | Status: FUNCTIONAL
Brand: ETHICON

## 2021-02-11 RX ORDER — PROMETHAZINE HYDROCHLORIDE 25 MG/1
25 SUPPOSITORY RECTAL ONCE AS NEEDED
Status: DISCONTINUED | OUTPATIENT
Start: 2021-02-11 | End: 2021-02-11

## 2021-02-11 RX ORDER — LIDOCAINE HYDROCHLORIDE 10 MG/ML
0.5 INJECTION, SOLUTION EPIDURAL; INFILTRATION; INTRACAUDAL; PERINEURAL ONCE
Status: COMPLETED | OUTPATIENT
Start: 2021-02-11 | End: 2021-02-11

## 2021-02-11 RX ORDER — DEXAMETHASONE SODIUM PHOSPHATE 4 MG/ML
INJECTION, SOLUTION INTRA-ARTICULAR; INTRALESIONAL; INTRAMUSCULAR; INTRAVENOUS; SOFT TISSUE AS NEEDED
Status: DISCONTINUED | OUTPATIENT
Start: 2021-02-11 | End: 2021-02-11 | Stop reason: SURG

## 2021-02-11 RX ORDER — AMOXICILLIN 250 MG
2 CAPSULE ORAL NIGHTLY PRN
Status: DISCONTINUED | OUTPATIENT
Start: 2021-02-11 | End: 2021-02-13 | Stop reason: HOSPADM

## 2021-02-11 RX ORDER — MEPERIDINE HYDROCHLORIDE 25 MG/ML
12.5 INJECTION INTRAMUSCULAR; INTRAVENOUS; SUBCUTANEOUS
Status: DISCONTINUED | OUTPATIENT
Start: 2021-02-11 | End: 2021-02-11

## 2021-02-11 RX ORDER — ROCURONIUM BROMIDE 10 MG/ML
INJECTION, SOLUTION INTRAVENOUS AS NEEDED
Status: DISCONTINUED | OUTPATIENT
Start: 2021-02-11 | End: 2021-02-11 | Stop reason: SURG

## 2021-02-11 RX ORDER — PROMETHAZINE HYDROCHLORIDE 12.5 MG/1
12.5 SUPPOSITORY RECTAL EVERY 6 HOURS PRN
Status: DISCONTINUED | OUTPATIENT
Start: 2021-02-11 | End: 2021-02-13 | Stop reason: HOSPADM

## 2021-02-11 RX ORDER — OXYCODONE HCL 10 MG/1
10 TABLET, FILM COATED, EXTENDED RELEASE ORAL ONCE
Status: COMPLETED | OUTPATIENT
Start: 2021-02-11 | End: 2021-02-11

## 2021-02-11 RX ORDER — IBUPROFEN 800 MG/1
800 TABLET ORAL ONCE
Status: COMPLETED | OUTPATIENT
Start: 2021-02-11 | End: 2021-02-11

## 2021-02-11 RX ORDER — PROMETHAZINE HYDROCHLORIDE 25 MG/1
25 TABLET ORAL ONCE AS NEEDED
Status: DISCONTINUED | OUTPATIENT
Start: 2021-02-11 | End: 2021-02-11

## 2021-02-11 RX ORDER — SODIUM CHLORIDE 9 MG/ML
INJECTION, SOLUTION INTRAVENOUS AS NEEDED
Status: DISCONTINUED | OUTPATIENT
Start: 2021-02-11 | End: 2021-02-11 | Stop reason: HOSPADM

## 2021-02-11 RX ORDER — PROMETHAZINE HYDROCHLORIDE 25 MG/1
25 TABLET ORAL ONCE AS NEEDED
Status: DISCONTINUED | OUTPATIENT
Start: 2021-02-11 | End: 2021-02-11 | Stop reason: SDUPTHER

## 2021-02-11 RX ORDER — OXYCODONE AND ACETAMINOPHEN 7.5; 325 MG/1; MG/1
1 TABLET ORAL EVERY 4 HOURS PRN
Status: DISCONTINUED | OUTPATIENT
Start: 2021-02-11 | End: 2021-02-13 | Stop reason: HOSPADM

## 2021-02-11 RX ORDER — GLYCOPYRROLATE 0.2 MG/ML
INJECTION INTRAMUSCULAR; INTRAVENOUS AS NEEDED
Status: DISCONTINUED | OUTPATIENT
Start: 2021-02-11 | End: 2021-02-11 | Stop reason: SURG

## 2021-02-11 RX ORDER — IPRATROPIUM BROMIDE AND ALBUTEROL SULFATE 2.5; .5 MG/3ML; MG/3ML
3 SOLUTION RESPIRATORY (INHALATION) ONCE AS NEEDED
Status: DISCONTINUED | OUTPATIENT
Start: 2021-02-11 | End: 2021-02-11

## 2021-02-11 RX ORDER — ONDANSETRON 4 MG/1
4 TABLET, FILM COATED ORAL EVERY 6 HOURS PRN
Status: DISCONTINUED | OUTPATIENT
Start: 2021-02-11 | End: 2021-02-13 | Stop reason: HOSPADM

## 2021-02-11 RX ORDER — PROPOFOL 10 MG/ML
VIAL (ML) INTRAVENOUS AS NEEDED
Status: DISCONTINUED | OUTPATIENT
Start: 2021-02-11 | End: 2021-02-11 | Stop reason: SURG

## 2021-02-11 RX ORDER — FAMOTIDINE 20 MG/1
20 TABLET, FILM COATED ORAL
Status: COMPLETED | OUTPATIENT
Start: 2021-02-11 | End: 2021-02-11

## 2021-02-11 RX ORDER — ONDANSETRON 2 MG/ML
4 INJECTION INTRAMUSCULAR; INTRAVENOUS ONCE AS NEEDED
Status: DISCONTINUED | OUTPATIENT
Start: 2021-02-11 | End: 2021-02-11

## 2021-02-11 RX ORDER — NALOXONE HCL 0.4 MG/ML
0.4 VIAL (ML) INJECTION
Status: DISCONTINUED | OUTPATIENT
Start: 2021-02-11 | End: 2021-02-13 | Stop reason: HOSPADM

## 2021-02-11 RX ORDER — SODIUM CHLORIDE 0.9 % (FLUSH) 0.9 %
3 SYRINGE (ML) INJECTION EVERY 12 HOURS SCHEDULED
Status: DISCONTINUED | OUTPATIENT
Start: 2021-02-11 | End: 2021-02-11

## 2021-02-11 RX ORDER — SODIUM CHLORIDE 0.9 % (FLUSH) 0.9 %
3 SYRINGE (ML) INJECTION EVERY 12 HOURS SCHEDULED
Status: DISCONTINUED | OUTPATIENT
Start: 2021-02-11 | End: 2021-02-13 | Stop reason: HOSPADM

## 2021-02-11 RX ORDER — ONDANSETRON 2 MG/ML
4 INJECTION INTRAMUSCULAR; INTRAVENOUS EVERY 6 HOURS PRN
Status: DISCONTINUED | OUTPATIENT
Start: 2021-02-11 | End: 2021-02-13 | Stop reason: HOSPADM

## 2021-02-11 RX ORDER — HYDROMORPHONE HYDROCHLORIDE 1 MG/ML
0.5 INJECTION, SOLUTION INTRAMUSCULAR; INTRAVENOUS; SUBCUTANEOUS
Status: DISCONTINUED | OUTPATIENT
Start: 2021-02-11 | End: 2021-02-11

## 2021-02-11 RX ORDER — BISACODYL 10 MG
10 SUPPOSITORY, RECTAL RECTAL DAILY PRN
Status: DISCONTINUED | OUTPATIENT
Start: 2021-02-11 | End: 2021-02-13 | Stop reason: HOSPADM

## 2021-02-11 RX ORDER — MORPHINE SULFATE 4 MG/ML
2 INJECTION, SOLUTION INTRAMUSCULAR; INTRAVENOUS EVERY 4 HOURS PRN
Status: DISCONTINUED | OUTPATIENT
Start: 2021-02-11 | End: 2021-02-13 | Stop reason: HOSPADM

## 2021-02-11 RX ORDER — SODIUM CHLORIDE, SODIUM LACTATE, POTASSIUM CHLORIDE, CALCIUM CHLORIDE 600; 310; 30; 20 MG/100ML; MG/100ML; MG/100ML; MG/100ML
90 INJECTION, SOLUTION INTRAVENOUS CONTINUOUS
Status: DISCONTINUED | OUTPATIENT
Start: 2021-02-11 | End: 2021-02-12

## 2021-02-11 RX ORDER — FENTANYL CITRATE 50 UG/ML
50 INJECTION, SOLUTION INTRAMUSCULAR; INTRAVENOUS
Status: COMPLETED | OUTPATIENT
Start: 2021-02-11 | End: 2021-02-11

## 2021-02-11 RX ORDER — IBUPROFEN 600 MG/1
600 TABLET ORAL EVERY 8 HOURS
Status: DISCONTINUED | OUTPATIENT
Start: 2021-02-11 | End: 2021-02-13 | Stop reason: HOSPADM

## 2021-02-11 RX ORDER — MAGNESIUM HYDROXIDE 1200 MG/15ML
LIQUID ORAL AS NEEDED
Status: DISCONTINUED | OUTPATIENT
Start: 2021-02-11 | End: 2021-02-11 | Stop reason: HOSPADM

## 2021-02-11 RX ORDER — ACETAMINOPHEN 325 MG/1
650 TABLET ORAL EVERY 4 HOURS PRN
Status: DISCONTINUED | OUTPATIENT
Start: 2021-02-11 | End: 2021-02-13 | Stop reason: HOSPADM

## 2021-02-11 RX ORDER — MORPHINE SULFATE 10 MG/ML
5 INJECTION, SOLUTION INTRAMUSCULAR; INTRAVENOUS EVERY 4 HOURS PRN
Status: DISCONTINUED | OUTPATIENT
Start: 2021-02-11 | End: 2021-02-13 | Stop reason: HOSPADM

## 2021-02-11 RX ORDER — PROMETHAZINE HYDROCHLORIDE 12.5 MG/1
12.5 TABLET ORAL EVERY 6 HOURS PRN
Status: DISCONTINUED | OUTPATIENT
Start: 2021-02-11 | End: 2021-02-13 | Stop reason: HOSPADM

## 2021-02-11 RX ORDER — EPHEDRINE SULFATE 50 MG/ML
INJECTION, SOLUTION INTRAVENOUS AS NEEDED
Status: DISCONTINUED | OUTPATIENT
Start: 2021-02-11 | End: 2021-02-11 | Stop reason: SURG

## 2021-02-11 RX ORDER — ONDANSETRON 2 MG/ML
INJECTION INTRAMUSCULAR; INTRAVENOUS AS NEEDED
Status: DISCONTINUED | OUTPATIENT
Start: 2021-02-11 | End: 2021-02-11 | Stop reason: SURG

## 2021-02-11 RX ORDER — LIDOCAINE HYDROCHLORIDE 20 MG/ML
JELLY TOPICAL AS NEEDED
Status: DISCONTINUED | OUTPATIENT
Start: 2021-02-11 | End: 2021-02-11 | Stop reason: SURG

## 2021-02-11 RX ORDER — PANTOPRAZOLE SODIUM 40 MG/1
40 TABLET, DELAYED RELEASE ORAL EVERY MORNING
Status: DISCONTINUED | OUTPATIENT
Start: 2021-02-12 | End: 2021-02-13 | Stop reason: HOSPADM

## 2021-02-11 RX ORDER — ESMOLOL HYDROCHLORIDE 10 MG/ML
INJECTION INTRAVENOUS AS NEEDED
Status: DISCONTINUED | OUTPATIENT
Start: 2021-02-11 | End: 2021-02-11 | Stop reason: SURG

## 2021-02-11 RX ORDER — NEOSTIGMINE METHYLSULFATE 1 MG/ML
INJECTION, SOLUTION INTRAVENOUS AS NEEDED
Status: DISCONTINUED | OUTPATIENT
Start: 2021-02-11 | End: 2021-02-11 | Stop reason: SURG

## 2021-02-11 RX ORDER — DROPERIDOL 2.5 MG/ML
0.62 INJECTION, SOLUTION INTRAMUSCULAR; INTRAVENOUS AS NEEDED
Status: DISCONTINUED | OUTPATIENT
Start: 2021-02-11 | End: 2021-02-11

## 2021-02-11 RX ORDER — BUPIVACAINE HYDROCHLORIDE AND EPINEPHRINE 2.5; 5 MG/ML; UG/ML
INJECTION, SOLUTION EPIDURAL; INFILTRATION; INTRACAUDAL; PERINEURAL AS NEEDED
Status: DISCONTINUED | OUTPATIENT
Start: 2021-02-11 | End: 2021-02-11 | Stop reason: HOSPADM

## 2021-02-11 RX ORDER — DROPERIDOL 2.5 MG/ML
0.62 INJECTION, SOLUTION INTRAMUSCULAR; INTRAVENOUS ONCE AS NEEDED
Status: DISCONTINUED | OUTPATIENT
Start: 2021-02-11 | End: 2021-02-11

## 2021-02-11 RX ORDER — DIPHENHYDRAMINE HCL 25 MG
25 CAPSULE ORAL NIGHTLY PRN
Status: DISCONTINUED | OUTPATIENT
Start: 2021-02-11 | End: 2021-02-13 | Stop reason: HOSPADM

## 2021-02-11 RX ORDER — SODIUM CHLORIDE 0.9 % (FLUSH) 0.9 %
3-10 SYRINGE (ML) INJECTION AS NEEDED
Status: DISCONTINUED | OUTPATIENT
Start: 2021-02-11 | End: 2021-02-11

## 2021-02-11 RX ORDER — LIDOCAINE HYDROCHLORIDE 10 MG/ML
INJECTION, SOLUTION EPIDURAL; INFILTRATION; INTRACAUDAL; PERINEURAL AS NEEDED
Status: DISCONTINUED | OUTPATIENT
Start: 2021-02-11 | End: 2021-02-11 | Stop reason: SURG

## 2021-02-11 RX ORDER — FENTANYL CITRATE 50 UG/ML
INJECTION, SOLUTION INTRAMUSCULAR; INTRAVENOUS AS NEEDED
Status: DISCONTINUED | OUTPATIENT
Start: 2021-02-11 | End: 2021-02-11 | Stop reason: SURG

## 2021-02-11 RX ORDER — SODIUM CHLORIDE, SODIUM LACTATE, POTASSIUM CHLORIDE, CALCIUM CHLORIDE 600; 310; 30; 20 MG/100ML; MG/100ML; MG/100ML; MG/100ML
9 INJECTION, SOLUTION INTRAVENOUS CONTINUOUS
Status: DISCONTINUED | OUTPATIENT
Start: 2021-02-11 | End: 2021-02-13 | Stop reason: HOSPADM

## 2021-02-11 RX ORDER — NALOXONE HCL 0.4 MG/ML
0.4 VIAL (ML) INJECTION AS NEEDED
Status: DISCONTINUED | OUTPATIENT
Start: 2021-02-11 | End: 2021-02-11

## 2021-02-11 RX ORDER — ACETAMINOPHEN 500 MG
1000 TABLET ORAL ONCE
Status: COMPLETED | OUTPATIENT
Start: 2021-02-11 | End: 2021-02-11

## 2021-02-11 RX ORDER — SODIUM CHLORIDE 0.9 % (FLUSH) 0.9 %
10 SYRINGE (ML) INJECTION AS NEEDED
Status: DISCONTINUED | OUTPATIENT
Start: 2021-02-11 | End: 2021-02-13 | Stop reason: HOSPADM

## 2021-02-11 RX ADMIN — OXYCODONE HYDROCHLORIDE 10 MG: 10 TABLET, FILM COATED, EXTENDED RELEASE ORAL at 10:38

## 2021-02-11 RX ADMIN — VANCOMYCIN HYDROCHLORIDE 1750 MG: 100 INJECTION, POWDER, LYOPHILIZED, FOR SOLUTION INTRAVENOUS at 12:36

## 2021-02-11 RX ADMIN — LIDOCAINE HYDROCHLORIDE 0.5 ML: 10 INJECTION, SOLUTION EPIDURAL; INFILTRATION; INTRACAUDAL; PERINEURAL at 10:29

## 2021-02-11 RX ADMIN — ACETAMINOPHEN 1000 MG: 500 TABLET ORAL at 10:38

## 2021-02-11 RX ADMIN — VANCOMYCIN HYDROCHLORIDE 1500 MG: 100 INJECTION, POWDER, LYOPHILIZED, FOR SOLUTION INTRAVENOUS at 23:51

## 2021-02-11 RX ADMIN — GLYCOPYRROLATE 0.4 MG: 0.4 INJECTION INTRAMUSCULAR; INTRAVENOUS at 15:02

## 2021-02-11 RX ADMIN — NEOSTIGMINE 3 MG: 1 INJECTION INTRAVENOUS at 15:02

## 2021-02-11 RX ADMIN — DEXAMETHASONE SODIUM PHOSPHATE 4 MG: 4 INJECTION, SOLUTION INTRA-ARTICULAR; INTRALESIONAL; INTRAMUSCULAR; INTRAVENOUS; SOFT TISSUE at 13:08

## 2021-02-11 RX ADMIN — LIDOCAINE HYDROCHLORIDE 50 MG: 10 INJECTION, SOLUTION EPIDURAL; INFILTRATION; INTRACAUDAL; PERINEURAL at 13:08

## 2021-02-11 RX ADMIN — LIDOCAINE HYDROCHLORIDE 2 ML: 20 JELLY TOPICAL at 13:10

## 2021-02-11 RX ADMIN — EPHEDRINE SULFATE 10 MG: 50 INJECTION, SOLUTION INTRAVENOUS at 13:36

## 2021-02-11 RX ADMIN — OXYCODONE HYDROCHLORIDE AND ACETAMINOPHEN 1 TABLET: 7.5; 325 TABLET ORAL at 22:36

## 2021-02-11 RX ADMIN — MORPHINE SULFATE 2 MG: 4 INJECTION, SOLUTION INTRAMUSCULAR; INTRAVENOUS at 22:37

## 2021-02-11 RX ADMIN — SODIUM CHLORIDE, POTASSIUM CHLORIDE, SODIUM LACTATE AND CALCIUM CHLORIDE 9 ML/HR: 600; 310; 30; 20 INJECTION, SOLUTION INTRAVENOUS at 10:29

## 2021-02-11 RX ADMIN — FENTANYL CITRATE 50 MCG: 0.05 INJECTION, SOLUTION INTRAMUSCULAR; INTRAVENOUS at 16:17

## 2021-02-11 RX ADMIN — ESMOLOL HYDROCHLORIDE 40 MG: 10 INJECTION, SOLUTION INTRAVENOUS at 13:08

## 2021-02-11 RX ADMIN — IBUPROFEN 600 MG: 600 TABLET, FILM COATED ORAL at 20:23

## 2021-02-11 RX ADMIN — ROCURONIUM BROMIDE 50 MG: 10 INJECTION INTRAVENOUS at 13:08

## 2021-02-11 RX ADMIN — FENTANYL CITRATE 50 MCG: 0.05 INJECTION, SOLUTION INTRAMUSCULAR; INTRAVENOUS at 17:13

## 2021-02-11 RX ADMIN — FAMOTIDINE 20 MG: 20 TABLET, FILM COATED ORAL at 10:38

## 2021-02-11 RX ADMIN — ONDANSETRON 4 MG: 2 INJECTION INTRAMUSCULAR; INTRAVENOUS at 14:52

## 2021-02-11 RX ADMIN — OXYCODONE HYDROCHLORIDE AND ACETAMINOPHEN 1 TABLET: 7.5; 325 TABLET ORAL at 18:02

## 2021-02-11 RX ADMIN — FENTANYL CITRATE 50 MCG: 50 INJECTION, SOLUTION INTRAMUSCULAR; INTRAVENOUS at 13:24

## 2021-02-11 RX ADMIN — PROPOFOL 200 MG: 10 INJECTION, EMULSION INTRAVENOUS at 13:08

## 2021-02-11 RX ADMIN — EPHEDRINE SULFATE 10 MG: 50 INJECTION, SOLUTION INTRAVENOUS at 14:24

## 2021-02-11 RX ADMIN — FENTANYL CITRATE 50 MCG: 0.05 INJECTION, SOLUTION INTRAMUSCULAR; INTRAVENOUS at 17:32

## 2021-02-11 RX ADMIN — IBUPROFEN 800 MG: 800 TABLET, FILM COATED ORAL at 10:38

## 2021-02-11 RX ADMIN — FENTANYL CITRATE 50 MCG: 0.05 INJECTION, SOLUTION INTRAMUSCULAR; INTRAVENOUS at 15:51

## 2021-02-12 PROCEDURE — 25010000002 VANCOMYCIN 10 G RECONSTITUTED SOLUTION: Performed by: NEUROLOGICAL SURGERY

## 2021-02-12 PROCEDURE — 99024 POSTOP FOLLOW-UP VISIT: CPT | Performed by: NEUROLOGICAL SURGERY

## 2021-02-12 RX ORDER — OXYCODONE HYDROCHLORIDE AND ACETAMINOPHEN 5; 325 MG/1; MG/1
1 TABLET ORAL 3 TIMES DAILY PRN
Qty: 25 TABLET | Refills: 0 | Status: SHIPPED | OUTPATIENT
Start: 2021-02-12 | End: 2021-03-26

## 2021-02-12 RX ADMIN — OXYCODONE HYDROCHLORIDE AND ACETAMINOPHEN 1 TABLET: 7.5; 325 TABLET ORAL at 04:22

## 2021-02-12 RX ADMIN — IBUPROFEN 600 MG: 600 TABLET, FILM COATED ORAL at 14:55

## 2021-02-12 RX ADMIN — IBUPROFEN 600 MG: 600 TABLET, FILM COATED ORAL at 06:11

## 2021-02-12 RX ADMIN — SODIUM CHLORIDE, PRESERVATIVE FREE 3 ML: 5 INJECTION INTRAVENOUS at 21:18

## 2021-02-12 RX ADMIN — IBUPROFEN 600 MG: 600 TABLET, FILM COATED ORAL at 21:17

## 2021-02-12 RX ADMIN — OXYCODONE HYDROCHLORIDE AND ACETAMINOPHEN 1 TABLET: 7.5; 325 TABLET ORAL at 11:22

## 2021-02-12 RX ADMIN — PANTOPRAZOLE SODIUM 40 MG: 40 TABLET, DELAYED RELEASE ORAL at 06:11

## 2021-02-12 RX ADMIN — OXYCODONE HYDROCHLORIDE AND ACETAMINOPHEN 1 TABLET: 7.5; 325 TABLET ORAL at 18:07

## 2021-02-13 VITALS
DIASTOLIC BLOOD PRESSURE: 57 MMHG | SYSTOLIC BLOOD PRESSURE: 94 MMHG | WEIGHT: 239 LBS | HEIGHT: 70 IN | TEMPERATURE: 98.3 F | BODY MASS INDEX: 34.22 KG/M2 | OXYGEN SATURATION: 96 % | RESPIRATION RATE: 17 BRPM | HEART RATE: 81 BPM

## 2021-02-13 PROCEDURE — 99024 POSTOP FOLLOW-UP VISIT: CPT | Performed by: PHYSICIAN ASSISTANT

## 2021-02-13 PROCEDURE — 94799 UNLISTED PULMONARY SVC/PX: CPT

## 2021-02-13 RX ADMIN — IBUPROFEN 600 MG: 600 TABLET, FILM COATED ORAL at 06:06

## 2021-02-13 RX ADMIN — OXYCODONE HYDROCHLORIDE AND ACETAMINOPHEN 1 TABLET: 7.5; 325 TABLET ORAL at 01:24

## 2021-02-13 RX ADMIN — OXYCODONE HYDROCHLORIDE AND ACETAMINOPHEN 1 TABLET: 7.5; 325 TABLET ORAL at 06:30

## 2021-02-13 RX ADMIN — SODIUM CHLORIDE, PRESERVATIVE FREE 3 ML: 5 INJECTION INTRAVENOUS at 09:09

## 2021-02-13 RX ADMIN — PANTOPRAZOLE SODIUM 40 MG: 40 TABLET, DELAYED RELEASE ORAL at 06:06

## 2021-02-15 ENCOUNTER — TELEPHONE (OUTPATIENT)
Dept: NEUROSURGERY | Facility: CLINIC | Age: 51
End: 2021-02-15

## 2021-02-15 RX ORDER — TAMSULOSIN HYDROCHLORIDE 0.4 MG/1
1 CAPSULE ORAL DAILY
Qty: 30 CAPSULE | Refills: 0 | Status: SHIPPED | OUTPATIENT
Start: 2021-02-15 | End: 2021-03-26 | Stop reason: SDUPTHER

## 2021-02-17 ENCOUNTER — TELEPHONE (OUTPATIENT)
Dept: NEUROSURGERY | Facility: CLINIC | Age: 51
End: 2021-02-17

## 2021-02-23 ENCOUNTER — OFFICE VISIT (OUTPATIENT)
Dept: NEUROSURGERY | Facility: CLINIC | Age: 51
End: 2021-02-23

## 2021-02-23 VITALS — BODY MASS INDEX: 33.79 KG/M2 | WEIGHT: 236 LBS | HEIGHT: 70 IN | TEMPERATURE: 97.5 F

## 2021-02-23 DIAGNOSIS — R20.0 BILATERAL LEG NUMBNESS: ICD-10-CM

## 2021-02-23 DIAGNOSIS — M48.062 SPINAL STENOSIS, LUMBAR REGION, WITH NEUROGENIC CLAUDICATION: ICD-10-CM

## 2021-02-23 DIAGNOSIS — G96.11 DURAL TEAR: ICD-10-CM

## 2021-02-23 DIAGNOSIS — E66.9 OBESITY (BMI 30-39.9): ICD-10-CM

## 2021-02-23 DIAGNOSIS — Z98.890 S/P LUMBAR LAMINECTOMY: Primary | ICD-10-CM

## 2021-02-23 DIAGNOSIS — M51.36 DEGENERATIVE DISC DISEASE, LUMBAR: ICD-10-CM

## 2021-02-23 PROCEDURE — 99024 POSTOP FOLLOW-UP VISIT: CPT | Performed by: PHYSICIAN ASSISTANT

## 2021-02-24 ENCOUNTER — TELEPHONE (OUTPATIENT)
Dept: NEUROSURGERY | Facility: CLINIC | Age: 51
End: 2021-02-24

## 2021-03-03 ENCOUNTER — TELEPHONE (OUTPATIENT)
Dept: NEUROSURGERY | Facility: CLINIC | Age: 51
End: 2021-03-03

## 2021-03-04 ENCOUNTER — TELEPHONE (OUTPATIENT)
Dept: NEUROSURGERY | Facility: CLINIC | Age: 51
End: 2021-03-04

## 2021-03-26 ENCOUNTER — OFFICE VISIT (OUTPATIENT)
Dept: NEUROSURGERY | Facility: CLINIC | Age: 51
End: 2021-03-26

## 2021-03-26 VITALS — BODY MASS INDEX: 33.93 KG/M2 | HEIGHT: 70 IN | TEMPERATURE: 98 F | WEIGHT: 237 LBS

## 2021-03-26 DIAGNOSIS — Z98.890 S/P LUMBAR LAMINECTOMY: Primary | ICD-10-CM

## 2021-03-26 DIAGNOSIS — M48.062 SPINAL STENOSIS, LUMBAR REGION, WITH NEUROGENIC CLAUDICATION: ICD-10-CM

## 2021-03-26 PROCEDURE — 99024 POSTOP FOLLOW-UP VISIT: CPT | Performed by: NEUROLOGICAL SURGERY

## 2021-03-26 RX ORDER — TAMSULOSIN HYDROCHLORIDE 0.4 MG/1
1 CAPSULE ORAL DAILY
Qty: 30 CAPSULE | Refills: 0 | Status: SHIPPED | OUTPATIENT
Start: 2021-03-26 | End: 2021-05-28

## 2021-05-28 ENCOUNTER — OFFICE VISIT (OUTPATIENT)
Dept: NEUROSURGERY | Facility: CLINIC | Age: 51
End: 2021-05-28

## 2021-05-28 VITALS
WEIGHT: 236 LBS | OXYGEN SATURATION: 98 % | HEIGHT: 70 IN | DIASTOLIC BLOOD PRESSURE: 80 MMHG | SYSTOLIC BLOOD PRESSURE: 110 MMHG | TEMPERATURE: 97.5 F | BODY MASS INDEX: 33.79 KG/M2 | HEART RATE: 78 BPM

## 2021-05-28 DIAGNOSIS — R33.8 ACUTE URINARY RETENTION: ICD-10-CM

## 2021-05-28 DIAGNOSIS — R20.0 BILATERAL LEG NUMBNESS: ICD-10-CM

## 2021-05-28 DIAGNOSIS — M48.062 SPINAL STENOSIS, LUMBAR REGION, WITH NEUROGENIC CLAUDICATION: Primary | ICD-10-CM

## 2021-05-28 DIAGNOSIS — M51.36 DEGENERATIVE DISC DISEASE, LUMBAR: ICD-10-CM

## 2021-05-28 PROCEDURE — 99213 OFFICE O/P EST LOW 20 MIN: CPT | Performed by: PHYSICIAN ASSISTANT

## 2021-05-28 RX ORDER — TAMSULOSIN HYDROCHLORIDE 0.4 MG/1
1 CAPSULE ORAL DAILY
Qty: 30 CAPSULE | Refills: 0 | Status: SHIPPED | OUTPATIENT
Start: 2021-05-28 | End: 2021-08-13

## 2021-08-09 ENCOUNTER — TELEPHONE (OUTPATIENT)
Dept: NEUROSURGERY | Facility: CLINIC | Age: 51
End: 2021-08-09

## 2021-08-11 ENCOUNTER — HOSPITAL ENCOUNTER (EMERGENCY)
Facility: HOSPITAL | Age: 51
Discharge: HOME | End: 2021-08-11
Attending: EMERGENCY MEDICINE | Admitting: EMERGENCY MEDICINE

## 2021-08-11 ENCOUNTER — APPOINTMENT (OUTPATIENT)
Dept: GENERAL RADIOLOGY | Facility: HOSPITAL | Age: 51
End: 2021-08-11

## 2021-08-11 VITALS
WEIGHT: 228 LBS | RESPIRATION RATE: 16 BRPM | DIASTOLIC BLOOD PRESSURE: 90 MMHG | BODY MASS INDEX: 32.64 KG/M2 | TEMPERATURE: 98.2 F | OXYGEN SATURATION: 96 % | HEIGHT: 70 IN | HEART RATE: 71 BPM | SYSTOLIC BLOOD PRESSURE: 139 MMHG

## 2021-08-11 DIAGNOSIS — M54.16 LUMBAR RADICULOPATHY: Primary | ICD-10-CM

## 2021-08-11 DIAGNOSIS — M54.31 SCIATICA OF RIGHT SIDE: ICD-10-CM

## 2021-08-11 LAB
ALBUMIN SERPL-MCNC: 4.2 G/DL (ref 3.5–5.2)
ALBUMIN/GLOB SERPL: 1.2 G/DL
ALP SERPL-CCNC: 81 U/L (ref 39–117)
ALT SERPL W P-5'-P-CCNC: 12 U/L (ref 1–41)
ANION GAP SERPL CALCULATED.3IONS-SCNC: 10 MMOL/L (ref 5–15)
AST SERPL-CCNC: 18 U/L (ref 1–40)
BASOPHILS # BLD AUTO: 0.04 10*3/MM3 (ref 0–0.2)
BASOPHILS NFR BLD AUTO: 0.6 % (ref 0–1.5)
BILIRUB SERPL-MCNC: 0.3 MG/DL (ref 0–1.2)
BILIRUB UR QL STRIP: NEGATIVE
BUN SERPL-MCNC: 13 MG/DL (ref 6–20)
BUN/CREAT SERPL: 13.3 (ref 7–25)
CALCIUM SPEC-SCNC: 9.3 MG/DL (ref 8.6–10.5)
CHLORIDE SERPL-SCNC: 98 MMOL/L (ref 98–107)
CLARITY UR: CLEAR
CO2 SERPL-SCNC: 30 MMOL/L (ref 22–29)
COLOR UR: YELLOW
CREAT SERPL-MCNC: 0.98 MG/DL (ref 0.76–1.27)
DEPRECATED RDW RBC AUTO: 46.8 FL (ref 37–54)
EOSINOPHIL # BLD AUTO: 0.07 10*3/MM3 (ref 0–0.4)
EOSINOPHIL NFR BLD AUTO: 1 % (ref 0.3–6.2)
ERYTHROCYTE [DISTWIDTH] IN BLOOD BY AUTOMATED COUNT: 15.7 % (ref 12.3–15.4)
GFR SERPL CREATININE-BSD FRML MDRD: 80 ML/MIN/1.73
GLOBULIN UR ELPH-MCNC: 3.6 GM/DL
GLUCOSE SERPL-MCNC: 106 MG/DL (ref 65–99)
GLUCOSE UR STRIP-MCNC: NEGATIVE MG/DL
HCT VFR BLD AUTO: 41.2 % (ref 37.5–51)
HGB BLD-MCNC: 13.2 G/DL (ref 13–17.7)
HGB UR QL STRIP.AUTO: NEGATIVE
IMM GRANULOCYTES # BLD AUTO: 0.02 10*3/MM3 (ref 0–0.05)
IMM GRANULOCYTES NFR BLD AUTO: 0.3 % (ref 0–0.5)
KETONES UR QL STRIP: NEGATIVE
LEUKOCYTE ESTERASE UR QL STRIP.AUTO: NEGATIVE
LYMPHOCYTES # BLD AUTO: 1.79 10*3/MM3 (ref 0.7–3.1)
LYMPHOCYTES NFR BLD AUTO: 24.7 % (ref 19.6–45.3)
MCH RBC QN AUTO: 26.3 PG (ref 26.6–33)
MCHC RBC AUTO-ENTMCNC: 32 G/DL (ref 31.5–35.7)
MCV RBC AUTO: 82.1 FL (ref 79–97)
MONOCYTES # BLD AUTO: 0.94 10*3/MM3 (ref 0.1–0.9)
MONOCYTES NFR BLD AUTO: 13 % (ref 5–12)
NEUTROPHILS NFR BLD AUTO: 4.39 10*3/MM3 (ref 1.7–7)
NEUTROPHILS NFR BLD AUTO: 60.4 % (ref 42.7–76)
NITRITE UR QL STRIP: NEGATIVE
NRBC BLD AUTO-RTO: 0 /100 WBC (ref 0–0.2)
PH UR STRIP.AUTO: 6.5 [PH] (ref 5–8)
PLATELET # BLD AUTO: 350 10*3/MM3 (ref 140–450)
PMV BLD AUTO: 9.4 FL (ref 6–12)
POTASSIUM SERPL-SCNC: 4.6 MMOL/L (ref 3.5–5.2)
PROT SERPL-MCNC: 7.8 G/DL (ref 6–8.5)
PROT UR QL STRIP: NEGATIVE
RBC # BLD AUTO: 5.02 10*6/MM3 (ref 4.14–5.8)
SODIUM SERPL-SCNC: 138 MMOL/L (ref 136–145)
SP GR UR STRIP: 1.02 (ref 1–1.03)
UROBILINOGEN UR QL STRIP: NORMAL
WBC # BLD AUTO: 7.25 10*3/MM3 (ref 3.4–10.8)

## 2021-08-11 PROCEDURE — 96374 THER/PROPH/DIAG INJ IV PUSH: CPT

## 2021-08-11 PROCEDURE — 96375 TX/PRO/DX INJ NEW DRUG ADDON: CPT

## 2021-08-11 PROCEDURE — 81003 URINALYSIS AUTO W/O SCOPE: CPT | Performed by: EMERGENCY MEDICINE

## 2021-08-11 PROCEDURE — 25010000002 DEXAMETHASONE: Performed by: EMERGENCY MEDICINE

## 2021-08-11 PROCEDURE — 25010000002 ONDANSETRON PER 1 MG: Performed by: EMERGENCY MEDICINE

## 2021-08-11 PROCEDURE — 99283 EMERGENCY DEPT VISIT LOW MDM: CPT

## 2021-08-11 PROCEDURE — 25010000002 KETOROLAC TROMETHAMINE PER 15 MG: Performed by: EMERGENCY MEDICINE

## 2021-08-11 PROCEDURE — 85025 COMPLETE CBC W/AUTO DIFF WBC: CPT | Performed by: EMERGENCY MEDICINE

## 2021-08-11 PROCEDURE — 25010000002 HYDROMORPHONE PER 4 MG: Performed by: EMERGENCY MEDICINE

## 2021-08-11 PROCEDURE — 80053 COMPREHEN METABOLIC PANEL: CPT | Performed by: EMERGENCY MEDICINE

## 2021-08-11 PROCEDURE — 96376 TX/PRO/DX INJ SAME DRUG ADON: CPT

## 2021-08-11 PROCEDURE — 72100 X-RAY EXAM L-S SPINE 2/3 VWS: CPT

## 2021-08-11 RX ORDER — KETOROLAC TROMETHAMINE 30 MG/ML
15 INJECTION, SOLUTION INTRAMUSCULAR; INTRAVENOUS ONCE
Status: COMPLETED | OUTPATIENT
Start: 2021-08-11 | End: 2021-08-11

## 2021-08-11 RX ORDER — ONDANSETRON 2 MG/ML
4 INJECTION INTRAMUSCULAR; INTRAVENOUS ONCE
Status: COMPLETED | OUTPATIENT
Start: 2021-08-11 | End: 2021-08-11

## 2021-08-11 RX ORDER — HYDROMORPHONE HYDROCHLORIDE 1 MG/ML
0.5 INJECTION, SOLUTION INTRAMUSCULAR; INTRAVENOUS; SUBCUTANEOUS ONCE
Status: COMPLETED | OUTPATIENT
Start: 2021-08-11 | End: 2021-08-11

## 2021-08-11 RX ORDER — PREDNISONE 20 MG/1
TABLET ORAL
Qty: 18 TABLET | Refills: 0 | Status: SHIPPED | OUTPATIENT
Start: 2021-08-11 | End: 2021-08-24

## 2021-08-11 RX ADMIN — HYDROMORPHONE HYDROCHLORIDE 0.5 MG: 1 INJECTION, SOLUTION INTRAMUSCULAR; INTRAVENOUS; SUBCUTANEOUS at 20:08

## 2021-08-11 RX ADMIN — HYDROMORPHONE HYDROCHLORIDE 0.5 MG: 1 INJECTION, SOLUTION INTRAMUSCULAR; INTRAVENOUS; SUBCUTANEOUS at 21:43

## 2021-08-11 RX ADMIN — ONDANSETRON 4 MG: 2 INJECTION INTRAMUSCULAR; INTRAVENOUS at 20:08

## 2021-08-11 RX ADMIN — KETOROLAC TROMETHAMINE 15 MG: 30 INJECTION, SOLUTION INTRAMUSCULAR; INTRAVENOUS at 20:08

## 2021-08-11 RX ADMIN — DEXAMETHASONE SODIUM PHOSPHATE 10 MG: 10 INJECTION INTRAMUSCULAR; INTRAVENOUS at 20:11

## 2021-08-13 ENCOUNTER — OFFICE VISIT (OUTPATIENT)
Dept: NEUROSURGERY | Facility: CLINIC | Age: 51
End: 2021-08-13

## 2021-08-13 VITALS
DIASTOLIC BLOOD PRESSURE: 70 MMHG | BODY MASS INDEX: 32.5 KG/M2 | TEMPERATURE: 97.5 F | WEIGHT: 227 LBS | HEIGHT: 70 IN | SYSTOLIC BLOOD PRESSURE: 132 MMHG

## 2021-08-13 DIAGNOSIS — Z98.890 S/P LUMBAR LAMINECTOMY: ICD-10-CM

## 2021-08-13 DIAGNOSIS — R39.11 URINARY HESITANCY: ICD-10-CM

## 2021-08-13 DIAGNOSIS — M51.36 DEGENERATIVE DISC DISEASE, LUMBAR: ICD-10-CM

## 2021-08-13 DIAGNOSIS — M53.3 SACROILIAC JOINT DYSFUNCTION OF RIGHT SIDE: Primary | ICD-10-CM

## 2021-08-13 DIAGNOSIS — M48.062 SPINAL STENOSIS, LUMBAR REGION, WITH NEUROGENIC CLAUDICATION: ICD-10-CM

## 2021-08-13 PROCEDURE — 99214 OFFICE O/P EST MOD 30 MIN: CPT | Performed by: PHYSICIAN ASSISTANT

## 2021-08-13 RX ORDER — GABAPENTIN 300 MG/1
CAPSULE ORAL
Qty: 40 CAPSULE | Refills: 1 | Status: SHIPPED | OUTPATIENT
Start: 2021-08-13 | End: 2021-08-27 | Stop reason: SDUPTHER

## 2021-08-13 RX ORDER — TAMSULOSIN HYDROCHLORIDE 0.4 MG/1
1 CAPSULE ORAL DAILY
Qty: 30 CAPSULE | Refills: 0 | Status: SHIPPED | OUTPATIENT
Start: 2021-08-13 | End: 2021-09-12

## 2021-08-13 RX ORDER — CARISOPRODOL 350 MG/1
TABLET ORAL
COMMUNITY
Start: 2021-08-09 | End: 2021-08-24

## 2021-08-13 RX ORDER — TRAMADOL HYDROCHLORIDE 50 MG/1
50 TABLET ORAL EVERY 8 HOURS PRN
Qty: 30 TABLET | Refills: 0 | Status: SHIPPED | OUTPATIENT
Start: 2021-08-13 | End: 2021-08-24 | Stop reason: SDUPTHER

## 2021-08-16 ENCOUNTER — TELEPHONE (OUTPATIENT)
Dept: NEUROSURGERY | Facility: CLINIC | Age: 51
End: 2021-08-16

## 2021-08-20 ENCOUNTER — TELEPHONE (OUTPATIENT)
Dept: NEUROSURGERY | Facility: CLINIC | Age: 51
End: 2021-08-20

## 2021-08-20 DIAGNOSIS — G89.29 CHRONIC RIGHT SHOULDER PAIN: Primary | ICD-10-CM

## 2021-08-20 DIAGNOSIS — M25.511 CHRONIC RIGHT SHOULDER PAIN: Primary | ICD-10-CM

## 2021-08-24 ENCOUNTER — OFFICE VISIT (OUTPATIENT)
Dept: NEUROSURGERY | Facility: CLINIC | Age: 51
End: 2021-08-24

## 2021-08-24 VITALS
SYSTOLIC BLOOD PRESSURE: 116 MMHG | BODY MASS INDEX: 32.47 KG/M2 | TEMPERATURE: 97.5 F | WEIGHT: 226.8 LBS | DIASTOLIC BLOOD PRESSURE: 76 MMHG | HEIGHT: 70 IN

## 2021-08-24 DIAGNOSIS — M48.062 SPINAL STENOSIS, LUMBAR REGION, WITH NEUROGENIC CLAUDICATION: ICD-10-CM

## 2021-08-24 DIAGNOSIS — M53.3 SACROILIAC JOINT DYSFUNCTION OF RIGHT SIDE: ICD-10-CM

## 2021-08-24 DIAGNOSIS — M51.36 DEGENERATIVE DISC DISEASE, LUMBAR: ICD-10-CM

## 2021-08-24 DIAGNOSIS — Z98.890 S/P LUMBAR LAMINECTOMY: ICD-10-CM

## 2021-08-24 DIAGNOSIS — R39.11 URINARY HESITANCY: ICD-10-CM

## 2021-08-24 PROBLEM — M51.369 DEGENERATIVE DISC DISEASE, LUMBAR: Status: ACTIVE | Noted: 2021-08-24

## 2021-08-24 PROCEDURE — 99214 OFFICE O/P EST MOD 30 MIN: CPT | Performed by: PHYSICIAN ASSISTANT

## 2021-08-24 RX ORDER — TRAMADOL HYDROCHLORIDE 50 MG/1
50 TABLET ORAL EVERY 8 HOURS PRN
Qty: 30 TABLET | Refills: 0 | Status: SHIPPED | OUTPATIENT
Start: 2021-08-24 | End: 2022-06-09

## 2021-08-27 ENCOUNTER — TELEPHONE (OUTPATIENT)
Dept: NEUROSURGERY | Facility: CLINIC | Age: 51
End: 2021-08-27

## 2021-08-27 DIAGNOSIS — M51.36 DEGENERATIVE DISC DISEASE, LUMBAR: ICD-10-CM

## 2021-08-27 DIAGNOSIS — M48.062 SPINAL STENOSIS, LUMBAR REGION, WITH NEUROGENIC CLAUDICATION: ICD-10-CM

## 2021-08-27 DIAGNOSIS — R39.11 URINARY HESITANCY: ICD-10-CM

## 2021-08-27 DIAGNOSIS — M53.3 SACROILIAC JOINT DYSFUNCTION OF RIGHT SIDE: ICD-10-CM

## 2021-08-27 DIAGNOSIS — Z98.890 S/P LUMBAR LAMINECTOMY: ICD-10-CM

## 2021-08-27 RX ORDER — GABAPENTIN 300 MG/1
CAPSULE ORAL
Qty: 120 CAPSULE | Refills: 0 | Status: SHIPPED | OUTPATIENT
Start: 2021-08-27 | End: 2021-09-08 | Stop reason: SDUPTHER

## 2021-09-02 ENCOUNTER — OFFICE VISIT (OUTPATIENT)
Dept: ORTHOPEDIC SURGERY | Facility: CLINIC | Age: 51
End: 2021-09-02

## 2021-09-02 VITALS
BODY MASS INDEX: 32.51 KG/M2 | DIASTOLIC BLOOD PRESSURE: 97 MMHG | HEIGHT: 70 IN | HEART RATE: 82 BPM | WEIGHT: 227.07 LBS | SYSTOLIC BLOOD PRESSURE: 134 MMHG

## 2021-09-02 DIAGNOSIS — IMO0002 DISORDER OF ROTATOR CUFF SYNDROME OF RIGHT SHOULDER AND ALLIED DISORDER: ICD-10-CM

## 2021-09-02 DIAGNOSIS — G89.29 CHRONIC RIGHT SHOULDER PAIN: Primary | ICD-10-CM

## 2021-09-02 DIAGNOSIS — M25.511 CHRONIC RIGHT SHOULDER PAIN: Primary | ICD-10-CM

## 2021-09-02 PROCEDURE — 99204 OFFICE O/P NEW MOD 45 MIN: CPT | Performed by: ORTHOPAEDIC SURGERY

## 2021-09-08 ENCOUNTER — OFFICE VISIT (OUTPATIENT)
Dept: NEUROSURGERY | Facility: CLINIC | Age: 51
End: 2021-09-08

## 2021-09-08 VITALS
HEIGHT: 70 IN | DIASTOLIC BLOOD PRESSURE: 88 MMHG | TEMPERATURE: 97.1 F | SYSTOLIC BLOOD PRESSURE: 138 MMHG | WEIGHT: 228.6 LBS | BODY MASS INDEX: 32.73 KG/M2

## 2021-09-08 DIAGNOSIS — R39.11 URINARY HESITANCY: ICD-10-CM

## 2021-09-08 DIAGNOSIS — M51.36 DEGENERATIVE DISC DISEASE, LUMBAR: ICD-10-CM

## 2021-09-08 DIAGNOSIS — Z98.890 S/P LUMBAR LAMINECTOMY: ICD-10-CM

## 2021-09-08 DIAGNOSIS — M48.062 SPINAL STENOSIS, LUMBAR REGION, WITH NEUROGENIC CLAUDICATION: ICD-10-CM

## 2021-09-08 DIAGNOSIS — M53.3 SACROILIAC JOINT DYSFUNCTION OF RIGHT SIDE: ICD-10-CM

## 2021-09-08 PROCEDURE — 99214 OFFICE O/P EST MOD 30 MIN: CPT | Performed by: PHYSICIAN ASSISTANT

## 2021-09-08 RX ORDER — GABAPENTIN 300 MG/1
300 CAPSULE ORAL 4 TIMES DAILY
Qty: 120 CAPSULE | Refills: 1 | Status: SHIPPED | OUTPATIENT
Start: 2021-09-08 | End: 2022-06-09

## 2021-09-20 ENCOUNTER — APPOINTMENT (OUTPATIENT)
Dept: MRI IMAGING | Facility: HOSPITAL | Age: 51
End: 2021-09-20

## 2021-09-28 ENCOUNTER — OFFICE VISIT (OUTPATIENT)
Dept: ORTHOPEDIC SURGERY | Facility: CLINIC | Age: 51
End: 2021-09-28

## 2021-09-28 VITALS
HEIGHT: 70 IN | WEIGHT: 229.28 LBS | SYSTOLIC BLOOD PRESSURE: 127 MMHG | DIASTOLIC BLOOD PRESSURE: 70 MMHG | BODY MASS INDEX: 32.82 KG/M2 | HEART RATE: 80 BPM

## 2021-09-28 DIAGNOSIS — M75.111 INCOMPLETE TEAR OF RIGHT ROTATOR CUFF, UNSPECIFIED WHETHER TRAUMATIC: ICD-10-CM

## 2021-09-28 DIAGNOSIS — M25.511 CHRONIC RIGHT SHOULDER PAIN: Primary | ICD-10-CM

## 2021-09-28 DIAGNOSIS — G89.29 CHRONIC RIGHT SHOULDER PAIN: Primary | ICD-10-CM

## 2021-09-28 DIAGNOSIS — M19.011 ARTHRITIS OF RIGHT ACROMIOCLAVICULAR JOINT: ICD-10-CM

## 2021-09-28 DIAGNOSIS — IMO0002 DISORDER OF ROTATOR CUFF SYNDROME OF RIGHT SHOULDER AND ALLIED DISORDER: ICD-10-CM

## 2021-09-28 DIAGNOSIS — M75.20 TENDINITIS OF LONG HEAD OF BICEPS: ICD-10-CM

## 2021-09-28 PROCEDURE — 99214 OFFICE O/P EST MOD 30 MIN: CPT | Performed by: ORTHOPAEDIC SURGERY

## 2021-10-06 ENCOUNTER — OFFICE VISIT (OUTPATIENT)
Dept: NEUROSURGERY | Facility: CLINIC | Age: 51
End: 2021-10-06

## 2021-10-06 VITALS
HEART RATE: 87 BPM | TEMPERATURE: 97.1 F | HEIGHT: 70 IN | BODY MASS INDEX: 33.27 KG/M2 | DIASTOLIC BLOOD PRESSURE: 88 MMHG | RESPIRATION RATE: 16 BRPM | WEIGHT: 232.4 LBS | OXYGEN SATURATION: 98 % | SYSTOLIC BLOOD PRESSURE: 140 MMHG

## 2021-10-06 DIAGNOSIS — M51.36 DEGENERATIVE DISC DISEASE, LUMBAR: ICD-10-CM

## 2021-10-06 DIAGNOSIS — M48.062 SPINAL STENOSIS, LUMBAR REGION, WITH NEUROGENIC CLAUDICATION: Primary | ICD-10-CM

## 2021-10-06 DIAGNOSIS — M53.3 SACROILIAC JOINT DYSFUNCTION OF RIGHT SIDE: ICD-10-CM

## 2021-10-06 DIAGNOSIS — R33.8 ACUTE URINARY RETENTION: ICD-10-CM

## 2021-10-06 PROCEDURE — 99214 OFFICE O/P EST MOD 30 MIN: CPT | Performed by: PHYSICIAN ASSISTANT

## 2021-10-06 RX ORDER — CIPROFLOXACIN AND DEXAMETHASONE 3; 1 MG/ML; MG/ML
SUSPENSION/ DROPS AURICULAR (OTIC)
COMMUNITY
End: 2021-11-17

## 2021-10-06 RX ORDER — MELOXICAM 15 MG/1
15 TABLET ORAL DAILY
Qty: 30 TABLET | Refills: 1 | Status: SHIPPED | OUTPATIENT
Start: 2021-10-06 | End: 2021-11-17

## 2021-10-07 ENCOUNTER — OFFICE VISIT (OUTPATIENT)
Dept: UROLOGY | Facility: CLINIC | Age: 51
End: 2021-10-07

## 2021-10-07 VITALS
WEIGHT: 232.6 LBS | TEMPERATURE: 98.1 F | HEART RATE: 87 BPM | DIASTOLIC BLOOD PRESSURE: 78 MMHG | BODY MASS INDEX: 33.3 KG/M2 | OXYGEN SATURATION: 98 % | SYSTOLIC BLOOD PRESSURE: 132 MMHG | HEIGHT: 70 IN

## 2021-10-07 DIAGNOSIS — R39.15 URINARY URGENCY: ICD-10-CM

## 2021-10-07 DIAGNOSIS — R33.9 URINARY RETENTION: Primary | ICD-10-CM

## 2021-10-07 DIAGNOSIS — R39.9 LOWER URINARY TRACT SYMPTOMS (LUTS): ICD-10-CM

## 2021-10-07 LAB
BILIRUB BLD-MCNC: NEGATIVE MG/DL
CLARITY, POC: CLEAR
COLOR UR: YELLOW
GLUCOSE UR STRIP-MCNC: NEGATIVE MG/DL
KETONES UR QL: NEGATIVE
LEUKOCYTE EST, POC: NEGATIVE
NITRITE UR-MCNC: NEGATIVE MG/ML
PH UR: 6 [PH] (ref 5–8)
PROT UR STRIP-MCNC: NEGATIVE MG/DL
RBC # UR STRIP: NEGATIVE /UL
SP GR UR: 1.03 (ref 1–1.03)
UROBILINOGEN UR QL: ABNORMAL

## 2021-10-07 PROCEDURE — 51798 US URINE CAPACITY MEASURE: CPT | Performed by: STUDENT IN AN ORGANIZED HEALTH CARE EDUCATION/TRAINING PROGRAM

## 2021-10-07 PROCEDURE — 81003 URINALYSIS AUTO W/O SCOPE: CPT | Performed by: STUDENT IN AN ORGANIZED HEALTH CARE EDUCATION/TRAINING PROGRAM

## 2021-10-07 PROCEDURE — 87086 URINE CULTURE/COLONY COUNT: CPT | Performed by: STUDENT IN AN ORGANIZED HEALTH CARE EDUCATION/TRAINING PROGRAM

## 2021-10-07 PROCEDURE — 99205 OFFICE O/P NEW HI 60 MIN: CPT | Performed by: STUDENT IN AN ORGANIZED HEALTH CARE EDUCATION/TRAINING PROGRAM

## 2021-10-07 RX ORDER — OXYBUTYNIN CHLORIDE 10 MG/1
10 TABLET, EXTENDED RELEASE ORAL DAILY
Qty: 30 TABLET | Refills: 3 | Status: SHIPPED | OUTPATIENT
Start: 2021-10-07 | End: 2021-11-08 | Stop reason: SINTOL

## 2021-10-09 LAB — BACTERIA SPEC AEROBE CULT: NO GROWTH

## 2021-10-11 ENCOUNTER — APPOINTMENT (OUTPATIENT)
Dept: PREADMISSION TESTING | Facility: HOSPITAL | Age: 51
End: 2021-10-11

## 2021-10-11 LAB — SARS-COV-2 RNA PNL SPEC NAA+PROBE: NOT DETECTED

## 2021-10-11 PROCEDURE — U0004 COV-19 TEST NON-CDC HGH THRU: HCPCS

## 2021-10-11 PROCEDURE — C9803 HOPD COVID-19 SPEC COLLECT: HCPCS

## 2021-10-13 ENCOUNTER — OUTSIDE FACILITY SERVICE (OUTPATIENT)
Dept: UROLOGY | Facility: CLINIC | Age: 51
End: 2021-10-13

## 2021-10-13 ENCOUNTER — TELEPHONE (OUTPATIENT)
Dept: UROLOGY | Facility: CLINIC | Age: 51
End: 2021-10-13

## 2021-10-15 ENCOUNTER — TRANSCRIBE ORDERS (OUTPATIENT)
Dept: ADMINISTRATIVE | Facility: HOSPITAL | Age: 51
End: 2021-10-15

## 2021-10-15 DIAGNOSIS — Z11.52 ENCOUNTER FOR SCREENING FOR COVID-19: Primary | ICD-10-CM

## 2021-10-19 ENCOUNTER — TELEPHONE (OUTPATIENT)
Dept: NEUROSURGERY | Facility: CLINIC | Age: 51
End: 2021-10-19

## 2021-10-26 ENCOUNTER — APPOINTMENT (OUTPATIENT)
Dept: PREADMISSION TESTING | Facility: HOSPITAL | Age: 51
End: 2021-10-26

## 2021-10-29 ENCOUNTER — OUTSIDE FACILITY SERVICE (OUTPATIENT)
Dept: ORTHOPEDIC SURGERY | Facility: CLINIC | Age: 51
End: 2021-10-29

## 2021-10-29 ENCOUNTER — TELEPHONE (OUTPATIENT)
Dept: NEUROSURGERY | Facility: CLINIC | Age: 51
End: 2021-10-29

## 2021-11-08 ENCOUNTER — PREP FOR SURGERY (OUTPATIENT)
Dept: OTHER | Facility: HOSPITAL | Age: 51
End: 2021-11-08

## 2021-11-08 ENCOUNTER — OFFICE VISIT (OUTPATIENT)
Dept: UROLOGY | Facility: CLINIC | Age: 51
End: 2021-11-08

## 2021-11-08 VITALS
DIASTOLIC BLOOD PRESSURE: 72 MMHG | OXYGEN SATURATION: 97 % | TEMPERATURE: 97.6 F | HEIGHT: 70 IN | HEART RATE: 86 BPM | BODY MASS INDEX: 33.58 KG/M2 | WEIGHT: 234.6 LBS | SYSTOLIC BLOOD PRESSURE: 124 MMHG

## 2021-11-08 DIAGNOSIS — N40.1 BENIGN LOCALIZED PROSTATIC HYPERPLASIA WITH LOWER URINARY TRACT SYMPTOMS (LUTS): ICD-10-CM

## 2021-11-08 DIAGNOSIS — N40.1 BENIGN PROSTATIC HYPERPLASIA WITH URINARY OBSTRUCTION: Primary | ICD-10-CM

## 2021-11-08 DIAGNOSIS — N13.8 BENIGN PROSTATIC HYPERPLASIA WITH URINARY OBSTRUCTION: Primary | ICD-10-CM

## 2021-11-08 DIAGNOSIS — R33.9 URINARY RETENTION: Primary | ICD-10-CM

## 2021-11-08 LAB
BILIRUB BLD-MCNC: NEGATIVE MG/DL
CLARITY, POC: CLEAR
COLOR UR: YELLOW
EXPIRATION DATE: NORMAL
GLUCOSE UR STRIP-MCNC: NEGATIVE MG/DL
KETONES UR QL: NEGATIVE
LEUKOCYTE EST, POC: NEGATIVE
Lab: NORMAL
NITRITE UR-MCNC: NEGATIVE MG/ML
PH UR: 6 [PH] (ref 5–8)
PROT UR STRIP-MCNC: NEGATIVE MG/DL
RBC # UR STRIP: NEGATIVE /UL
SP GR UR: 1.01 (ref 1–1.03)
UROBILINOGEN UR QL: NORMAL

## 2021-11-08 PROCEDURE — 81003 URINALYSIS AUTO W/O SCOPE: CPT | Performed by: STUDENT IN AN ORGANIZED HEALTH CARE EDUCATION/TRAINING PROGRAM

## 2021-11-08 PROCEDURE — 87086 URINE CULTURE/COLONY COUNT: CPT | Performed by: STUDENT IN AN ORGANIZED HEALTH CARE EDUCATION/TRAINING PROGRAM

## 2021-11-08 PROCEDURE — 51798 US URINE CAPACITY MEASURE: CPT | Performed by: STUDENT IN AN ORGANIZED HEALTH CARE EDUCATION/TRAINING PROGRAM

## 2021-11-08 PROCEDURE — 99214 OFFICE O/P EST MOD 30 MIN: CPT | Performed by: STUDENT IN AN ORGANIZED HEALTH CARE EDUCATION/TRAINING PROGRAM

## 2021-11-08 RX ORDER — CEFAZOLIN SODIUM 2 G/100ML
2 INJECTION, SOLUTION INTRAVENOUS ONCE
Status: CANCELLED | OUTPATIENT
Start: 2021-11-08 | End: 2021-11-08

## 2021-11-09 LAB — BACTERIA SPEC AEROBE CULT: NO GROWTH

## 2021-11-17 ENCOUNTER — PRE-ADMISSION TESTING (OUTPATIENT)
Dept: PREADMISSION TESTING | Facility: HOSPITAL | Age: 51
End: 2021-11-17

## 2021-11-17 VITALS — BODY MASS INDEX: 33.49 KG/M2 | HEIGHT: 70 IN | WEIGHT: 233.91 LBS

## 2021-11-17 DIAGNOSIS — N13.8 BENIGN PROSTATIC HYPERPLASIA WITH URINARY OBSTRUCTION: ICD-10-CM

## 2021-11-17 DIAGNOSIS — N40.1 BENIGN PROSTATIC HYPERPLASIA WITH URINARY OBSTRUCTION: ICD-10-CM

## 2021-11-17 LAB
ANION GAP SERPL CALCULATED.3IONS-SCNC: 9 MMOL/L (ref 5–15)
BUN SERPL-MCNC: 19 MG/DL (ref 6–20)
BUN/CREAT SERPL: 20 (ref 7–25)
CALCIUM SPEC-SCNC: 9.1 MG/DL (ref 8.6–10.5)
CHLORIDE SERPL-SCNC: 99 MMOL/L (ref 98–107)
CO2 SERPL-SCNC: 30 MMOL/L (ref 22–29)
CREAT SERPL-MCNC: 0.95 MG/DL (ref 0.76–1.27)
DEPRECATED RDW RBC AUTO: 47 FL (ref 37–54)
ERYTHROCYTE [DISTWIDTH] IN BLOOD BY AUTOMATED COUNT: 14.6 % (ref 12.3–15.4)
GFR SERPL CREATININE-BSD FRML MDRD: 83 ML/MIN/1.73
GLUCOSE SERPL-MCNC: 89 MG/DL (ref 65–99)
HCT VFR BLD AUTO: 41.8 % (ref 37.5–51)
HGB BLD-MCNC: 13.3 G/DL (ref 13–17.7)
MCH RBC QN AUTO: 27.9 PG (ref 26.6–33)
MCHC RBC AUTO-ENTMCNC: 31.8 G/DL (ref 31.5–35.7)
MCV RBC AUTO: 87.6 FL (ref 79–97)
PLATELET # BLD AUTO: 117 10*3/MM3 (ref 140–450)
PMV BLD AUTO: 10.7 FL (ref 6–12)
POTASSIUM SERPL-SCNC: 4.5 MMOL/L (ref 3.5–5.2)
QT INTERVAL: 358 MS
QTC INTERVAL: 430 MS
RBC # BLD AUTO: 4.77 10*6/MM3 (ref 4.14–5.8)
SARS-COV-2 RNA PNL SPEC NAA+PROBE: NOT DETECTED
SODIUM SERPL-SCNC: 138 MMOL/L (ref 136–145)
WBC NRBC COR # BLD: 6.58 10*3/MM3 (ref 3.4–10.8)

## 2021-11-17 PROCEDURE — U0004 COV-19 TEST NON-CDC HGH THRU: HCPCS

## 2021-11-17 PROCEDURE — 93010 ELECTROCARDIOGRAM REPORT: CPT | Performed by: INTERNAL MEDICINE

## 2021-11-17 PROCEDURE — 85027 COMPLETE CBC AUTOMATED: CPT

## 2021-11-17 PROCEDURE — 93005 ELECTROCARDIOGRAM TRACING: CPT

## 2021-11-17 PROCEDURE — 36415 COLL VENOUS BLD VENIPUNCTURE: CPT

## 2021-11-17 PROCEDURE — 80048 BASIC METABOLIC PNL TOTAL CA: CPT

## 2021-11-17 PROCEDURE — C9803 HOPD COVID-19 SPEC COLLECT: HCPCS

## 2021-11-19 ENCOUNTER — ANESTHESIA (OUTPATIENT)
Dept: PERIOP | Facility: HOSPITAL | Age: 51
End: 2021-11-19

## 2021-11-19 ENCOUNTER — HOSPITAL ENCOUNTER (OUTPATIENT)
Facility: HOSPITAL | Age: 51
Setting detail: HOSPITAL OUTPATIENT SURGERY
Discharge: HOME | End: 2021-11-19
Attending: STUDENT IN AN ORGANIZED HEALTH CARE EDUCATION/TRAINING PROGRAM | Admitting: STUDENT IN AN ORGANIZED HEALTH CARE EDUCATION/TRAINING PROGRAM

## 2021-11-19 ENCOUNTER — ANESTHESIA EVENT (OUTPATIENT)
Dept: PERIOP | Facility: HOSPITAL | Age: 51
End: 2021-11-19

## 2021-11-19 ENCOUNTER — OUTSIDE FACILITY SERVICE (OUTPATIENT)
Dept: UROLOGY | Facility: CLINIC | Age: 51
End: 2021-11-19

## 2021-11-19 VITALS
SYSTOLIC BLOOD PRESSURE: 117 MMHG | BODY MASS INDEX: 33.49 KG/M2 | RESPIRATION RATE: 16 BRPM | TEMPERATURE: 98.7 F | OXYGEN SATURATION: 99 % | HEART RATE: 71 BPM | WEIGHT: 233.91 LBS | HEIGHT: 70 IN | DIASTOLIC BLOOD PRESSURE: 86 MMHG

## 2021-11-19 DIAGNOSIS — N40.1 BENIGN PROSTATIC HYPERPLASIA WITH URINARY OBSTRUCTION: ICD-10-CM

## 2021-11-19 DIAGNOSIS — N13.8 BENIGN PROSTATIC HYPERPLASIA WITH URINARY OBSTRUCTION: ICD-10-CM

## 2021-11-19 PROCEDURE — 0 CEFAZOLIN IN DEXTROSE 2-4 GM/100ML-% SOLUTION: Performed by: STUDENT IN AN ORGANIZED HEALTH CARE EDUCATION/TRAINING PROGRAM

## 2021-11-19 PROCEDURE — 88305 TISSUE EXAM BY PATHOLOGIST: CPT | Performed by: STUDENT IN AN ORGANIZED HEALTH CARE EDUCATION/TRAINING PROGRAM

## 2021-11-19 PROCEDURE — 25010000002 ONDANSETRON PER 1 MG: Performed by: NURSE ANESTHETIST, CERTIFIED REGISTERED

## 2021-11-19 PROCEDURE — 25010000002 DEXAMETHASONE PER 1 MG: Performed by: NURSE ANESTHETIST, CERTIFIED REGISTERED

## 2021-11-19 PROCEDURE — 52648 LASER SURGERY OF PROSTATE: CPT | Performed by: STUDENT IN AN ORGANIZED HEALTH CARE EDUCATION/TRAINING PROGRAM

## 2021-11-19 PROCEDURE — 25010000002 PROPOFOL 10 MG/ML EMULSION: Performed by: NURSE ANESTHETIST, CERTIFIED REGISTERED

## 2021-11-19 PROCEDURE — C1758 CATHETER, URETERAL: HCPCS | Performed by: STUDENT IN AN ORGANIZED HEALTH CARE EDUCATION/TRAINING PROGRAM

## 2021-11-19 PROCEDURE — 25010000002 FENTANYL CITRATE (PF) 50 MCG/ML SOLUTION: Performed by: NURSE ANESTHETIST, CERTIFIED REGISTERED

## 2021-11-19 RX ORDER — DEXAMETHASONE SODIUM PHOSPHATE 10 MG/ML
INJECTION INTRAMUSCULAR; INTRAVENOUS AS NEEDED
Status: DISCONTINUED | OUTPATIENT
Start: 2021-11-19 | End: 2021-11-19 | Stop reason: SURG

## 2021-11-19 RX ORDER — MIDAZOLAM HYDROCHLORIDE 1 MG/ML
1 INJECTION INTRAMUSCULAR; INTRAVENOUS
Status: DISCONTINUED | OUTPATIENT
Start: 2021-11-19 | End: 2021-11-19 | Stop reason: HOSPADM

## 2021-11-19 RX ORDER — ONDANSETRON 2 MG/ML
INJECTION INTRAMUSCULAR; INTRAVENOUS AS NEEDED
Status: DISCONTINUED | OUTPATIENT
Start: 2021-11-19 | End: 2021-11-19 | Stop reason: SURG

## 2021-11-19 RX ORDER — OXYBUTYNIN CHLORIDE 10 MG/1
10 TABLET, EXTENDED RELEASE ORAL DAILY
Qty: 30 TABLET | Refills: 0 | Status: SHIPPED | OUTPATIENT
Start: 2021-11-19 | End: 2021-12-19

## 2021-11-19 RX ORDER — HYDROMORPHONE HYDROCHLORIDE 1 MG/ML
0.5 INJECTION, SOLUTION INTRAMUSCULAR; INTRAVENOUS; SUBCUTANEOUS
Status: DISCONTINUED | OUTPATIENT
Start: 2021-11-19 | End: 2021-11-19 | Stop reason: HOSPADM

## 2021-11-19 RX ORDER — FENTANYL CITRATE 50 UG/ML
INJECTION, SOLUTION INTRAMUSCULAR; INTRAVENOUS AS NEEDED
Status: DISCONTINUED | OUTPATIENT
Start: 2021-11-19 | End: 2021-11-19 | Stop reason: SURG

## 2021-11-19 RX ORDER — SODIUM CHLORIDE 0.9 % (FLUSH) 0.9 %
10 SYRINGE (ML) INJECTION AS NEEDED
Status: DISCONTINUED | OUTPATIENT
Start: 2021-11-19 | End: 2021-11-19 | Stop reason: HOSPADM

## 2021-11-19 RX ORDER — MAGNESIUM HYDROXIDE 1200 MG/15ML
LIQUID ORAL AS NEEDED
Status: DISCONTINUED | OUTPATIENT
Start: 2021-11-19 | End: 2021-11-19 | Stop reason: HOSPADM

## 2021-11-19 RX ORDER — NAPROXEN SODIUM 220 MG
440 TABLET ORAL 2 TIMES DAILY PRN
COMMUNITY

## 2021-11-19 RX ORDER — SODIUM CHLORIDE 9 MG/ML
9 INJECTION, SOLUTION INTRAVENOUS CONTINUOUS PRN
Status: DISCONTINUED | OUTPATIENT
Start: 2021-11-19 | End: 2021-11-19 | Stop reason: HOSPADM

## 2021-11-19 RX ORDER — CEFAZOLIN SODIUM 2 G/100ML
2 INJECTION, SOLUTION INTRAVENOUS ONCE
Status: COMPLETED | OUTPATIENT
Start: 2021-11-19 | End: 2021-11-19

## 2021-11-19 RX ORDER — SODIUM CHLORIDE 0.9 % (FLUSH) 0.9 %
10 SYRINGE (ML) INJECTION EVERY 12 HOURS SCHEDULED
Status: DISCONTINUED | OUTPATIENT
Start: 2021-11-19 | End: 2021-11-19 | Stop reason: HOSPADM

## 2021-11-19 RX ORDER — LIDOCAINE HYDROCHLORIDE 20 MG/ML
JELLY TOPICAL AS NEEDED
Status: DISCONTINUED | OUTPATIENT
Start: 2021-11-19 | End: 2021-11-19 | Stop reason: HOSPADM

## 2021-11-19 RX ORDER — FENTANYL CITRATE 50 UG/ML
50 INJECTION, SOLUTION INTRAMUSCULAR; INTRAVENOUS
Status: DISCONTINUED | OUTPATIENT
Start: 2021-11-19 | End: 2021-11-19 | Stop reason: HOSPADM

## 2021-11-19 RX ORDER — PHENAZOPYRIDINE HYDROCHLORIDE 200 MG/1
200 TABLET, FILM COATED ORAL 3 TIMES DAILY PRN
Qty: 20 TABLET | Refills: 0 | Status: SHIPPED | OUTPATIENT
Start: 2021-11-19 | End: 2021-12-21

## 2021-11-19 RX ORDER — PROPOFOL 10 MG/ML
VIAL (ML) INTRAVENOUS AS NEEDED
Status: DISCONTINUED | OUTPATIENT
Start: 2021-11-19 | End: 2021-11-19 | Stop reason: SURG

## 2021-11-19 RX ORDER — OXYCODONE AND ACETAMINOPHEN 7.5; 325 MG/1; MG/1
1 TABLET ORAL ONCE AS NEEDED
Status: DISCONTINUED | OUTPATIENT
Start: 2021-11-19 | End: 2021-11-19 | Stop reason: HOSPADM

## 2021-11-19 RX ORDER — ONDANSETRON 2 MG/ML
4 INJECTION INTRAMUSCULAR; INTRAVENOUS ONCE AS NEEDED
Status: DISCONTINUED | OUTPATIENT
Start: 2021-11-19 | End: 2021-11-19 | Stop reason: HOSPADM

## 2021-11-19 RX ORDER — LIDOCAINE HYDROCHLORIDE 10 MG/ML
0.5 INJECTION, SOLUTION EPIDURAL; INFILTRATION; INTRACAUDAL; PERINEURAL ONCE AS NEEDED
Status: COMPLETED | OUTPATIENT
Start: 2021-11-19 | End: 2021-11-19

## 2021-11-19 RX ORDER — HYOSCYAMINE SULFATE 0.12 MG/1
0.12 TABLET SUBLINGUAL EVERY 4 HOURS PRN
Qty: 60 EACH | Refills: 1 | Status: SHIPPED | OUTPATIENT
Start: 2021-11-19 | End: 2021-12-21

## 2021-11-19 RX ORDER — IBUPROFEN 600 MG/1
600 TABLET ORAL EVERY 6 HOURS PRN
Status: DISCONTINUED | OUTPATIENT
Start: 2021-11-19 | End: 2021-11-19 | Stop reason: HOSPADM

## 2021-11-19 RX ORDER — SODIUM CHLORIDE, SODIUM LACTATE, POTASSIUM CHLORIDE, CALCIUM CHLORIDE 600; 310; 30; 20 MG/100ML; MG/100ML; MG/100ML; MG/100ML
9 INJECTION, SOLUTION INTRAVENOUS CONTINUOUS
Status: DISCONTINUED | OUTPATIENT
Start: 2021-11-19 | End: 2021-11-19 | Stop reason: HOSPADM

## 2021-11-19 RX ORDER — LIDOCAINE HYDROCHLORIDE 20 MG/ML
INJECTION, SOLUTION INFILTRATION; PERINEURAL AS NEEDED
Status: DISCONTINUED | OUTPATIENT
Start: 2021-11-19 | End: 2021-11-19 | Stop reason: SURG

## 2021-11-19 RX ORDER — FAMOTIDINE 20 MG/1
20 TABLET, FILM COATED ORAL
Status: COMPLETED | OUTPATIENT
Start: 2021-11-19 | End: 2021-11-19

## 2021-11-19 RX ORDER — DEXAMETHASONE SODIUM PHOSPHATE 4 MG/ML
8 INJECTION, SOLUTION INTRA-ARTICULAR; INTRALESIONAL; INTRAMUSCULAR; INTRAVENOUS; SOFT TISSUE ONCE AS NEEDED
Status: DISCONTINUED | OUTPATIENT
Start: 2021-11-19 | End: 2021-11-19 | Stop reason: HOSPADM

## 2021-11-19 RX ADMIN — FAMOTIDINE 20 MG: 20 TABLET ORAL at 09:51

## 2021-11-19 RX ADMIN — LIDOCAINE HYDROCHLORIDE 0.5 ML: 10 INJECTION, SOLUTION EPIDURAL; INFILTRATION; INTRACAUDAL; PERINEURAL at 09:51

## 2021-11-19 RX ADMIN — FENTANYL CITRATE 25 MCG: 50 INJECTION, SOLUTION INTRAMUSCULAR; INTRAVENOUS at 13:54

## 2021-11-19 RX ADMIN — PROPOFOL 100 MG: 10 INJECTION, EMULSION INTRAVENOUS at 13:12

## 2021-11-19 RX ADMIN — CEFAZOLIN SODIUM 2 G: 2 INJECTION, SOLUTION INTRAVENOUS at 12:59

## 2021-11-19 RX ADMIN — ONDANSETRON 4 MG: 2 INJECTION INTRAMUSCULAR; INTRAVENOUS at 13:11

## 2021-11-19 RX ADMIN — PROPOFOL 200 MG: 10 INJECTION, EMULSION INTRAVENOUS at 13:03

## 2021-11-19 RX ADMIN — LIDOCAINE HYDROCHLORIDE 50 MG: 20 INJECTION, SOLUTION INFILTRATION; PERINEURAL at 13:03

## 2021-11-19 RX ADMIN — FENTANYL CITRATE 50 MCG: 50 INJECTION, SOLUTION INTRAMUSCULAR; INTRAVENOUS at 13:03

## 2021-11-19 RX ADMIN — DEXAMETHASONE SODIUM PHOSPHATE 8 MG: 10 INJECTION INTRAMUSCULAR; INTRAVENOUS at 13:08

## 2021-11-19 RX ADMIN — SODIUM CHLORIDE, POTASSIUM CHLORIDE, SODIUM LACTATE AND CALCIUM CHLORIDE 9 ML/HR: 600; 310; 30; 20 INJECTION, SOLUTION INTRAVENOUS at 09:51

## 2021-11-19 RX ADMIN — FENTANYL CITRATE 25 MCG: 50 INJECTION, SOLUTION INTRAMUSCULAR; INTRAVENOUS at 13:17

## 2021-11-22 LAB
CYTO UR: NORMAL
LAB AP CASE REPORT: NORMAL
LAB AP CLINICAL INFORMATION: NORMAL
PATH REPORT.FINAL DX SPEC: NORMAL
PATH REPORT.GROSS SPEC: NORMAL

## 2021-12-03 ENCOUNTER — TELEPHONE (OUTPATIENT)
Dept: ORTHOPEDIC SURGERY | Facility: CLINIC | Age: 51
End: 2021-12-03

## 2021-12-05 DIAGNOSIS — M75.111 INCOMPLETE TEAR OF RIGHT ROTATOR CUFF, UNSPECIFIED WHETHER TRAUMATIC: Primary | ICD-10-CM

## 2021-12-07 ENCOUNTER — LAB (OUTPATIENT)
Dept: PREADMISSION TESTING | Facility: HOSPITAL | Age: 51
End: 2021-12-07

## 2021-12-07 DIAGNOSIS — M75.111 INCOMPLETE TEAR OF RIGHT ROTATOR CUFF, UNSPECIFIED WHETHER TRAUMATIC: ICD-10-CM

## 2021-12-07 DIAGNOSIS — Z11.52 ENCOUNTER FOR SCREENING FOR COVID-19: ICD-10-CM

## 2021-12-07 LAB — SARS-COV-2 RNA PNL SPEC NAA+PROBE: NOT DETECTED

## 2021-12-07 PROCEDURE — U0004 COV-19 TEST NON-CDC HGH THRU: HCPCS

## 2021-12-07 PROCEDURE — C9803 HOPD COVID-19 SPEC COLLECT: HCPCS

## 2021-12-10 ENCOUNTER — OUTSIDE FACILITY SERVICE (OUTPATIENT)
Dept: ORTHOPEDIC SURGERY | Facility: CLINIC | Age: 51
End: 2021-12-10

## 2021-12-10 DIAGNOSIS — Z98.890 S/P ARTHROSCOPY OF SHOULDER: Primary | ICD-10-CM

## 2021-12-10 PROCEDURE — 29828 SHO ARTHRS SRG BICP TENODSIS: CPT | Performed by: ORTHOPAEDIC SURGERY

## 2021-12-10 PROCEDURE — 29827 SHO ARTHRS SRG RT8TR CUF RPR: CPT | Performed by: ORTHOPAEDIC SURGERY

## 2021-12-10 RX ORDER — HYDROCODONE BITARTRATE AND ACETAMINOPHEN 7.5; 325 MG/1; MG/1
1 TABLET ORAL EVERY 6 HOURS PRN
Qty: 30 TABLET | Refills: 0 | Status: SHIPPED | OUTPATIENT
Start: 2021-12-10 | End: 2021-12-21

## 2021-12-21 ENCOUNTER — OFFICE VISIT (OUTPATIENT)
Dept: NEUROSURGERY | Facility: CLINIC | Age: 51
End: 2021-12-21

## 2021-12-21 VITALS
BODY MASS INDEX: 34.62 KG/M2 | SYSTOLIC BLOOD PRESSURE: 124 MMHG | DIASTOLIC BLOOD PRESSURE: 78 MMHG | WEIGHT: 241.8 LBS | TEMPERATURE: 98 F | HEIGHT: 70 IN

## 2021-12-21 DIAGNOSIS — M48.062 SPINAL STENOSIS, LUMBAR REGION, WITH NEUROGENIC CLAUDICATION: Primary | ICD-10-CM

## 2021-12-21 DIAGNOSIS — E66.9 OBESITY (BMI 30-39.9): ICD-10-CM

## 2021-12-21 DIAGNOSIS — M51.36 DEGENERATIVE DISC DISEASE, LUMBAR: ICD-10-CM

## 2021-12-21 PROCEDURE — 99213 OFFICE O/P EST LOW 20 MIN: CPT | Performed by: PHYSICIAN ASSISTANT

## 2021-12-21 RX ORDER — OXYBUTYNIN CHLORIDE 10 MG/1
10 TABLET, EXTENDED RELEASE ORAL DAILY
COMMUNITY
End: 2022-01-25

## 2021-12-28 ENCOUNTER — OFFICE VISIT (OUTPATIENT)
Dept: ORTHOPEDIC SURGERY | Facility: CLINIC | Age: 51
End: 2021-12-28

## 2021-12-28 VITALS — TEMPERATURE: 97.3 F

## 2021-12-28 DIAGNOSIS — M75.111 INCOMPLETE TEAR OF RIGHT ROTATOR CUFF, UNSPECIFIED WHETHER TRAUMATIC: ICD-10-CM

## 2021-12-28 DIAGNOSIS — M75.20 TENDINITIS OF LONG HEAD OF BICEPS: ICD-10-CM

## 2021-12-28 DIAGNOSIS — Z98.890 S/P ARTHROSCOPY OF SHOULDER: Primary | ICD-10-CM

## 2021-12-28 PROCEDURE — 99024 POSTOP FOLLOW-UP VISIT: CPT | Performed by: PHYSICIAN ASSISTANT

## 2022-01-06 ENCOUNTER — OFFICE VISIT (OUTPATIENT)
Dept: UROLOGY | Facility: CLINIC | Age: 52
End: 2022-01-06

## 2022-01-06 VITALS
SYSTOLIC BLOOD PRESSURE: 128 MMHG | WEIGHT: 246.2 LBS | TEMPERATURE: 97.4 F | HEART RATE: 90 BPM | DIASTOLIC BLOOD PRESSURE: 74 MMHG | OXYGEN SATURATION: 99 % | HEIGHT: 70 IN | BODY MASS INDEX: 35.24 KG/M2

## 2022-01-06 DIAGNOSIS — N40.1 BENIGN LOCALIZED PROSTATIC HYPERPLASIA WITH LOWER URINARY TRACT SYMPTOMS (LUTS): Primary | ICD-10-CM

## 2022-01-06 LAB
BILIRUB BLD-MCNC: NEGATIVE MG/DL
CLARITY, POC: CLEAR
COLOR UR: YELLOW
EXPIRATION DATE: ABNORMAL
GLUCOSE UR STRIP-MCNC: NEGATIVE MG/DL
KETONES UR QL: NEGATIVE
LEUKOCYTE EST, POC: NEGATIVE
Lab: ABNORMAL
NITRITE UR-MCNC: NEGATIVE MG/ML
PH UR: 7.5 [PH] (ref 5–8)
PROT UR STRIP-MCNC: ABNORMAL MG/DL
RBC # UR STRIP: ABNORMAL /UL
SP GR UR: 1.01 (ref 1–1.03)
UROBILINOGEN UR QL: NORMAL

## 2022-01-06 PROCEDURE — 51798 US URINE CAPACITY MEASURE: CPT | Performed by: STUDENT IN AN ORGANIZED HEALTH CARE EDUCATION/TRAINING PROGRAM

## 2022-01-06 PROCEDURE — 99024 POSTOP FOLLOW-UP VISIT: CPT | Performed by: STUDENT IN AN ORGANIZED HEALTH CARE EDUCATION/TRAINING PROGRAM

## 2022-01-06 PROCEDURE — 81003 URINALYSIS AUTO W/O SCOPE: CPT | Performed by: STUDENT IN AN ORGANIZED HEALTH CARE EDUCATION/TRAINING PROGRAM

## 2022-01-25 ENCOUNTER — OFFICE VISIT (OUTPATIENT)
Dept: ORTHOPEDIC SURGERY | Facility: CLINIC | Age: 52
End: 2022-01-25

## 2022-01-25 DIAGNOSIS — Z98.890 S/P COMPLETE REPAIR OF ROTATOR CUFF: Primary | ICD-10-CM

## 2022-01-25 DIAGNOSIS — M75.20 TENDINITIS OF LONG HEAD OF BICEPS: ICD-10-CM

## 2022-01-25 PROCEDURE — 99024 POSTOP FOLLOW-UP VISIT: CPT | Performed by: ORTHOPAEDIC SURGERY

## 2022-03-03 ENCOUNTER — OFFICE VISIT (OUTPATIENT)
Dept: UROLOGY | Facility: CLINIC | Age: 52
End: 2022-03-03

## 2022-03-03 VITALS
OXYGEN SATURATION: 97 % | WEIGHT: 242.2 LBS | HEART RATE: 87 BPM | SYSTOLIC BLOOD PRESSURE: 128 MMHG | HEIGHT: 70 IN | DIASTOLIC BLOOD PRESSURE: 78 MMHG | BODY MASS INDEX: 34.67 KG/M2

## 2022-03-03 DIAGNOSIS — N50.812 LEFT TESTICULAR PAIN: Primary | ICD-10-CM

## 2022-03-03 LAB
BILIRUB BLD-MCNC: NEGATIVE MG/DL
CLARITY, POC: CLEAR
COLOR UR: YELLOW
EXPIRATION DATE: NORMAL
GLUCOSE UR STRIP-MCNC: NEGATIVE MG/DL
KETONES UR QL: NEGATIVE
LEUKOCYTE EST, POC: NEGATIVE
Lab: NORMAL
NITRITE UR-MCNC: NEGATIVE MG/ML
PH UR: 6 [PH] (ref 5–8)
PROT UR STRIP-MCNC: NEGATIVE MG/DL
RBC # UR STRIP: NEGATIVE /UL
SP GR UR: 1.02 (ref 1–1.03)
UROBILINOGEN UR QL: NORMAL

## 2022-03-03 PROCEDURE — 81003 URINALYSIS AUTO W/O SCOPE: CPT | Performed by: STUDENT IN AN ORGANIZED HEALTH CARE EDUCATION/TRAINING PROGRAM

## 2022-03-03 PROCEDURE — 99213 OFFICE O/P EST LOW 20 MIN: CPT | Performed by: STUDENT IN AN ORGANIZED HEALTH CARE EDUCATION/TRAINING PROGRAM

## 2022-03-08 ENCOUNTER — OFFICE VISIT (OUTPATIENT)
Dept: ORTHOPEDIC SURGERY | Facility: CLINIC | Age: 52
End: 2022-03-08

## 2022-03-08 VITALS
DIASTOLIC BLOOD PRESSURE: 76 MMHG | SYSTOLIC BLOOD PRESSURE: 132 MMHG | BODY MASS INDEX: 34.72 KG/M2 | HEIGHT: 70 IN | WEIGHT: 242.51 LBS

## 2022-03-08 DIAGNOSIS — M75.20 TENDINITIS OF LONG HEAD OF BICEPS: Primary | ICD-10-CM

## 2022-03-08 DIAGNOSIS — Z98.890 S/P COMPLETE REPAIR OF ROTATOR CUFF: ICD-10-CM

## 2022-03-08 PROCEDURE — 99024 POSTOP FOLLOW-UP VISIT: CPT | Performed by: ORTHOPAEDIC SURGERY

## 2022-03-08 RX ORDER — GABAPENTIN 300 MG/1
CAPSULE ORAL EVERY 6 HOURS SCHEDULED
COMMUNITY
End: 2022-06-09

## 2022-06-01 ENCOUNTER — TELEPHONE (OUTPATIENT)
Dept: ORTHOPEDIC SURGERY | Facility: CLINIC | Age: 52
End: 2022-06-01

## 2022-06-09 ENCOUNTER — OFFICE VISIT (OUTPATIENT)
Dept: ORTHOPEDIC SURGERY | Facility: CLINIC | Age: 52
End: 2022-06-09

## 2022-06-09 VITALS
WEIGHT: 232 LBS | SYSTOLIC BLOOD PRESSURE: 125 MMHG | HEIGHT: 70 IN | DIASTOLIC BLOOD PRESSURE: 78 MMHG | BODY MASS INDEX: 33.21 KG/M2

## 2022-06-09 DIAGNOSIS — M79.644 FINGER PAIN, RIGHT: Primary | ICD-10-CM

## 2022-06-09 DIAGNOSIS — M20.011 ACQUIRED MALLET DEFORMITY OF FINGER OF RIGHT HAND: ICD-10-CM

## 2022-06-09 PROCEDURE — 99214 OFFICE O/P EST MOD 30 MIN: CPT | Performed by: PHYSICIAN ASSISTANT

## 2022-06-09 RX ORDER — IBUPROFEN 600 MG/1
TABLET ORAL
COMMUNITY
Start: 2022-06-01 | End: 2022-09-09

## 2022-07-07 ENCOUNTER — OFFICE VISIT (OUTPATIENT)
Dept: UROLOGY | Facility: CLINIC | Age: 52
End: 2022-07-07

## 2022-07-07 VITALS
SYSTOLIC BLOOD PRESSURE: 138 MMHG | HEIGHT: 70 IN | OXYGEN SATURATION: 97 % | DIASTOLIC BLOOD PRESSURE: 74 MMHG | BODY MASS INDEX: 33.79 KG/M2 | WEIGHT: 236 LBS | HEART RATE: 80 BPM

## 2022-07-07 DIAGNOSIS — N40.1 BENIGN LOCALIZED PROSTATIC HYPERPLASIA WITH LOWER URINARY TRACT SYMPTOMS (LUTS): ICD-10-CM

## 2022-07-07 DIAGNOSIS — R33.9 URINARY RETENTION: ICD-10-CM

## 2022-07-07 DIAGNOSIS — R39.9 LOWER URINARY TRACT SYMPTOMS (LUTS): ICD-10-CM

## 2022-07-07 DIAGNOSIS — N50.812 LEFT TESTICULAR PAIN: Primary | ICD-10-CM

## 2022-07-07 DIAGNOSIS — R39.15 URINARY URGENCY: ICD-10-CM

## 2022-07-07 LAB
BILIRUB BLD-MCNC: NEGATIVE MG/DL
CLARITY, POC: CLEAR
COLOR UR: YELLOW
EXPIRATION DATE: NORMAL
GLUCOSE UR STRIP-MCNC: NEGATIVE MG/DL
KETONES UR QL: NEGATIVE
LEUKOCYTE EST, POC: NEGATIVE
Lab: NORMAL
NITRITE UR-MCNC: NEGATIVE MG/ML
PH UR: 7 [PH] (ref 5–8)
PROT UR STRIP-MCNC: NEGATIVE MG/DL
SP GR UR: 1.01 (ref 1–1.03)
UROBILINOGEN UR QL: NORMAL

## 2022-07-07 PROCEDURE — 81003 URINALYSIS AUTO W/O SCOPE: CPT | Performed by: STUDENT IN AN ORGANIZED HEALTH CARE EDUCATION/TRAINING PROGRAM

## 2022-07-07 PROCEDURE — 99214 OFFICE O/P EST MOD 30 MIN: CPT | Performed by: STUDENT IN AN ORGANIZED HEALTH CARE EDUCATION/TRAINING PROGRAM

## 2022-07-07 PROCEDURE — 51798 US URINE CAPACITY MEASURE: CPT | Performed by: STUDENT IN AN ORGANIZED HEALTH CARE EDUCATION/TRAINING PROGRAM

## 2022-07-07 RX ORDER — TAMSULOSIN HYDROCHLORIDE 0.4 MG/1
1 CAPSULE ORAL DAILY
Qty: 90 CAPSULE | Refills: 0 | Status: SHIPPED | OUTPATIENT
Start: 2022-07-07 | End: 2022-08-16 | Stop reason: SDUPTHER

## 2022-07-21 ENCOUNTER — OFFICE VISIT (OUTPATIENT)
Dept: ORTHOPEDIC SURGERY | Facility: CLINIC | Age: 52
End: 2022-07-21

## 2022-07-21 VITALS
SYSTOLIC BLOOD PRESSURE: 120 MMHG | HEIGHT: 70 IN | BODY MASS INDEX: 33.77 KG/M2 | WEIGHT: 235.89 LBS | DIASTOLIC BLOOD PRESSURE: 70 MMHG

## 2022-07-21 DIAGNOSIS — M20.011 ACQUIRED MALLET DEFORMITY OF FINGER OF RIGHT HAND: Primary | ICD-10-CM

## 2022-07-21 PROCEDURE — 99213 OFFICE O/P EST LOW 20 MIN: CPT | Performed by: PHYSICIAN ASSISTANT

## 2022-08-16 ENCOUNTER — OFFICE VISIT (OUTPATIENT)
Dept: UROLOGY | Facility: CLINIC | Age: 52
End: 2022-08-16

## 2022-08-16 VITALS — HEIGHT: 70 IN | BODY MASS INDEX: 33.64 KG/M2 | WEIGHT: 235 LBS

## 2022-08-16 DIAGNOSIS — N40.1 BENIGN LOCALIZED PROSTATIC HYPERPLASIA WITH LOWER URINARY TRACT SYMPTOMS (LUTS): ICD-10-CM

## 2022-08-16 DIAGNOSIS — R39.9 LOWER URINARY TRACT SYMPTOMS (LUTS): Primary | ICD-10-CM

## 2022-08-16 LAB
BILIRUB BLD-MCNC: NEGATIVE MG/DL
CLARITY, POC: CLEAR
COLOR UR: YELLOW
EXPIRATION DATE: NORMAL
GLUCOSE UR STRIP-MCNC: NEGATIVE MG/DL
KETONES UR QL: NEGATIVE
LEUKOCYTE EST, POC: NEGATIVE
Lab: NORMAL
NITRITE UR-MCNC: NEGATIVE MG/ML
PH UR: 6 [PH] (ref 5–8)
PROT UR STRIP-MCNC: NEGATIVE MG/DL
RBC # UR STRIP: NEGATIVE /UL
SP GR UR: 1.03 (ref 1–1.03)
UROBILINOGEN UR QL: NORMAL

## 2022-08-16 PROCEDURE — 81003 URINALYSIS AUTO W/O SCOPE: CPT | Performed by: STUDENT IN AN ORGANIZED HEALTH CARE EDUCATION/TRAINING PROGRAM

## 2022-08-16 PROCEDURE — 99214 OFFICE O/P EST MOD 30 MIN: CPT | Performed by: STUDENT IN AN ORGANIZED HEALTH CARE EDUCATION/TRAINING PROGRAM

## 2022-08-16 PROCEDURE — 51798 US URINE CAPACITY MEASURE: CPT | Performed by: STUDENT IN AN ORGANIZED HEALTH CARE EDUCATION/TRAINING PROGRAM

## 2022-08-16 RX ORDER — TAMSULOSIN HYDROCHLORIDE 0.4 MG/1
1 CAPSULE ORAL DAILY
Qty: 90 CAPSULE | Refills: 0 | Status: SHIPPED | OUTPATIENT
Start: 2022-08-16 | End: 2022-09-09

## 2022-08-30 ENCOUNTER — PROCEDURE VISIT (OUTPATIENT)
Dept: UROLOGY | Facility: CLINIC | Age: 52
End: 2022-08-30

## 2022-08-30 VITALS — BODY MASS INDEX: 33.64 KG/M2 | WEIGHT: 235 LBS | HEIGHT: 70 IN

## 2022-08-30 DIAGNOSIS — R39.9 LOWER URINARY TRACT SYMPTOMS (LUTS): Primary | ICD-10-CM

## 2022-08-30 LAB
BILIRUB BLD-MCNC: ABNORMAL MG/DL
CLARITY, POC: ABNORMAL
COLOR UR: YELLOW
EXPIRATION DATE: ABNORMAL
GLUCOSE UR STRIP-MCNC: NEGATIVE MG/DL
KETONES UR QL: ABNORMAL
LEUKOCYTE EST, POC: ABNORMAL
Lab: ABNORMAL
NITRITE UR-MCNC: NEGATIVE MG/ML
PH UR: 6 [PH] (ref 5–8)
PROT UR STRIP-MCNC: ABNORMAL MG/DL
RBC # UR STRIP: NEGATIVE /UL
SP GR UR: 1.02 (ref 1–1.03)
UROBILINOGEN UR QL: NORMAL

## 2022-08-30 PROCEDURE — 52000 CYSTOURETHROSCOPY: CPT | Performed by: STUDENT IN AN ORGANIZED HEALTH CARE EDUCATION/TRAINING PROGRAM

## 2022-08-30 PROCEDURE — 81003 URINALYSIS AUTO W/O SCOPE: CPT | Performed by: STUDENT IN AN ORGANIZED HEALTH CARE EDUCATION/TRAINING PROGRAM

## 2022-09-09 ENCOUNTER — OFFICE VISIT (OUTPATIENT)
Dept: ORTHOPEDIC SURGERY | Facility: CLINIC | Age: 52
End: 2022-09-09

## 2022-09-09 VITALS — SYSTOLIC BLOOD PRESSURE: 118 MMHG | DIASTOLIC BLOOD PRESSURE: 92 MMHG

## 2022-09-09 DIAGNOSIS — M20.011 ACQUIRED MALLET DEFORMITY OF FINGER OF RIGHT HAND: Primary | ICD-10-CM

## 2022-09-09 PROCEDURE — 99213 OFFICE O/P EST LOW 20 MIN: CPT | Performed by: PHYSICIAN ASSISTANT

## 2022-09-26 ENCOUNTER — OFFICE VISIT (OUTPATIENT)
Dept: UROLOGY | Facility: CLINIC | Age: 52
End: 2022-09-26

## 2022-09-26 DIAGNOSIS — N40.1 BENIGN LOCALIZED PROSTATIC HYPERPLASIA WITH LOWER URINARY TRACT SYMPTOMS (LUTS): ICD-10-CM

## 2022-09-26 DIAGNOSIS — N50.812 LEFT TESTICULAR PAIN: ICD-10-CM

## 2022-09-26 DIAGNOSIS — R39.15 URINARY URGENCY: ICD-10-CM

## 2022-09-26 DIAGNOSIS — R39.9 LOWER URINARY TRACT SYMPTOMS (LUTS): Primary | ICD-10-CM

## 2022-09-26 DIAGNOSIS — R33.9 URINARY RETENTION: ICD-10-CM

## 2022-09-26 LAB
BILIRUB BLD-MCNC: NEGATIVE MG/DL
CLARITY, POC: CLEAR
COLOR UR: YELLOW
EXPIRATION DATE: ABNORMAL
GLUCOSE UR STRIP-MCNC: NEGATIVE MG/DL
KETONES UR QL: NEGATIVE
LEUKOCYTE EST, POC: NEGATIVE
Lab: ABNORMAL
NITRITE UR-MCNC: NEGATIVE MG/ML
PH UR: 7.5 [PH] (ref 5–8)
PROT UR STRIP-MCNC: ABNORMAL MG/DL
RBC # UR STRIP: NEGATIVE /UL
SP GR UR: 1.01 (ref 1–1.03)
UROBILINOGEN UR QL: NORMAL

## 2022-09-26 PROCEDURE — 51784 ANAL/URINARY MUSCLE STUDY: CPT | Performed by: STUDENT IN AN ORGANIZED HEALTH CARE EDUCATION/TRAINING PROGRAM

## 2022-09-26 PROCEDURE — 51741 ELECTRO-UROFLOWMETRY FIRST: CPT | Performed by: STUDENT IN AN ORGANIZED HEALTH CARE EDUCATION/TRAINING PROGRAM

## 2022-09-26 PROCEDURE — 51797 INTRAABDOMINAL PRESSURE TEST: CPT | Performed by: STUDENT IN AN ORGANIZED HEALTH CARE EDUCATION/TRAINING PROGRAM

## 2022-09-26 PROCEDURE — 51729 CYSTOMETROGRAM W/VP&UP: CPT | Performed by: STUDENT IN AN ORGANIZED HEALTH CARE EDUCATION/TRAINING PROGRAM

## 2022-10-06 ENCOUNTER — OFFICE VISIT (OUTPATIENT)
Dept: UROLOGY | Facility: CLINIC | Age: 52
End: 2022-10-06

## 2022-10-06 VITALS — BODY MASS INDEX: 33.72 KG/M2 | HEIGHT: 70 IN

## 2022-10-06 DIAGNOSIS — N31.9 NEUROGENIC BLADDER DISORDER: ICD-10-CM

## 2022-10-06 DIAGNOSIS — R39.9 LOWER URINARY TRACT SYMPTOMS (LUTS): Primary | ICD-10-CM

## 2022-10-06 PROCEDURE — 99213 OFFICE O/P EST LOW 20 MIN: CPT | Performed by: STUDENT IN AN ORGANIZED HEALTH CARE EDUCATION/TRAINING PROGRAM

## 2022-11-21 ENCOUNTER — TELEPHONE (OUTPATIENT)
Dept: UROLOGY | Facility: CLINIC | Age: 52
End: 2022-11-21

## 2022-12-30 ENCOUNTER — OFFICE VISIT (OUTPATIENT)
Dept: NEUROSURGERY | Facility: CLINIC | Age: 52
End: 2022-12-30

## 2022-12-30 VITALS — HEIGHT: 70 IN | TEMPERATURE: 97.8 F | WEIGHT: 244.4 LBS | BODY MASS INDEX: 34.99 KG/M2

## 2022-12-30 DIAGNOSIS — M51.36 DEGENERATIVE DISC DISEASE, LUMBAR: ICD-10-CM

## 2022-12-30 DIAGNOSIS — M48.062 SPINAL STENOSIS, LUMBAR REGION, WITH NEUROGENIC CLAUDICATION: Primary | ICD-10-CM

## 2022-12-30 DIAGNOSIS — R33.8 ACUTE URINARY RETENTION: ICD-10-CM

## 2022-12-30 DIAGNOSIS — Z98.890 S/P LUMBAR LAMINECTOMY: ICD-10-CM

## 2022-12-30 PROCEDURE — 99214 OFFICE O/P EST MOD 30 MIN: CPT | Performed by: NEUROLOGICAL SURGERY

## 2022-12-30 RX ORDER — GABAPENTIN 300 MG/1
300 CAPSULE ORAL 2 TIMES DAILY
COMMUNITY

## 2023-02-15 ENCOUNTER — OFFICE VISIT (OUTPATIENT)
Dept: NEUROSURGERY | Facility: CLINIC | Age: 53
End: 2023-02-15
Payer: COMMERCIAL

## 2023-02-15 VITALS — HEIGHT: 70 IN | BODY MASS INDEX: 34.65 KG/M2 | WEIGHT: 242 LBS | TEMPERATURE: 97.5 F

## 2023-02-15 DIAGNOSIS — M48.062 SPINAL STENOSIS OF LUMBAR REGION WITH NEUROGENIC CLAUDICATION: Primary | ICD-10-CM

## 2023-02-15 DIAGNOSIS — Z98.1 HISTORY OF FUSION OF CERVICAL SPINE: ICD-10-CM

## 2023-02-15 DIAGNOSIS — M54.9 MECHANICAL BACK PAIN: ICD-10-CM

## 2023-02-15 PROCEDURE — 99213 OFFICE O/P EST LOW 20 MIN: CPT | Performed by: NEUROLOGICAL SURGERY

## 2023-09-14 ENCOUNTER — OFFICE VISIT (OUTPATIENT)
Dept: ORTHOPEDIC SURGERY | Facility: CLINIC | Age: 53
End: 2023-09-14
Payer: COMMERCIAL

## 2023-09-14 VITALS
HEIGHT: 70 IN | BODY MASS INDEX: 32.64 KG/M2 | WEIGHT: 228 LBS | SYSTOLIC BLOOD PRESSURE: 118 MMHG | DIASTOLIC BLOOD PRESSURE: 88 MMHG

## 2023-09-14 DIAGNOSIS — E66.09 CLASS 1 OBESITY DUE TO EXCESS CALORIES WITHOUT SERIOUS COMORBIDITY WITH BODY MASS INDEX (BMI) OF 33.0 TO 33.9 IN ADULT: ICD-10-CM

## 2023-09-14 DIAGNOSIS — M17.11 PRIMARY OSTEOARTHRITIS OF RIGHT KNEE: ICD-10-CM

## 2023-09-14 DIAGNOSIS — M25.561 RIGHT KNEE PAIN, UNSPECIFIED CHRONICITY: Primary | ICD-10-CM

## 2023-09-14 DIAGNOSIS — M89.8X6 EXOSTOSIS OF RIGHT TIBIA: ICD-10-CM

## 2023-09-14 RX ORDER — CARISOPRODOL 350 MG/1
TABLET ORAL
COMMUNITY
Start: 2023-09-12

## 2023-09-14 RX ORDER — MELOXICAM 15 MG/1
TABLET ORAL
COMMUNITY
Start: 2023-09-12

## 2023-09-14 RX ADMIN — LIDOCAINE HYDROCHLORIDE 4 ML: 10 INJECTION, SOLUTION EPIDURAL; INFILTRATION; INTRACAUDAL; PERINEURAL at 12:15

## 2023-09-14 RX ADMIN — TRIAMCINOLONE ACETONIDE 1 ML: 40 INJECTION, SUSPENSION INTRA-ARTICULAR; INTRAMUSCULAR at 12:15

## 2023-09-21 RX ORDER — TRIAMCINOLONE ACETONIDE 40 MG/ML
1 INJECTION, SUSPENSION INTRA-ARTICULAR; INTRAMUSCULAR
Status: COMPLETED | OUTPATIENT
Start: 2023-09-14 | End: 2023-09-14

## 2023-09-21 RX ORDER — LIDOCAINE HYDROCHLORIDE 10 MG/ML
4 INJECTION, SOLUTION EPIDURAL; INFILTRATION; INTRACAUDAL; PERINEURAL
Status: COMPLETED | OUTPATIENT
Start: 2023-09-14 | End: 2023-09-14

## 2023-11-13 ENCOUNTER — OFFICE VISIT (OUTPATIENT)
Dept: UROLOGY | Facility: CLINIC | Age: 53
End: 2023-11-13
Payer: COMMERCIAL

## 2023-11-13 VITALS — BODY MASS INDEX: 33.19 KG/M2 | HEIGHT: 70 IN | HEART RATE: 72 BPM | OXYGEN SATURATION: 92 %

## 2023-11-13 DIAGNOSIS — N40.1 BENIGN LOCALIZED PROSTATIC HYPERPLASIA WITH LOWER URINARY TRACT SYMPTOMS (LUTS): ICD-10-CM

## 2023-11-13 DIAGNOSIS — R39.12 WEAK URINARY STREAM: ICD-10-CM

## 2023-11-13 DIAGNOSIS — N31.9 NEUROGENIC BLADDER DISORDER: ICD-10-CM

## 2023-11-13 DIAGNOSIS — N32.0 BLADDER NECK CONTRACTURE: Primary | ICD-10-CM

## 2023-11-13 LAB
BILIRUB BLD-MCNC: ABNORMAL MG/DL
CLARITY, POC: CLEAR
COLOR UR: YELLOW
EXPIRATION DATE: ABNORMAL
GLUCOSE UR STRIP-MCNC: NEGATIVE MG/DL
KETONES UR QL: NEGATIVE
LEUKOCYTE EST, POC: NEGATIVE
Lab: ABNORMAL
NITRITE UR-MCNC: NEGATIVE MG/ML
PH UR: 6 [PH] (ref 5–8)
PROT UR STRIP-MCNC: NEGATIVE MG/DL
RBC # UR STRIP: ABNORMAL /UL
SP GR UR: 1.02 (ref 1–1.03)
UROBILINOGEN UR QL: NORMAL

## 2023-11-13 PROCEDURE — 51798 US URINE CAPACITY MEASURE: CPT | Performed by: STUDENT IN AN ORGANIZED HEALTH CARE EDUCATION/TRAINING PROGRAM

## 2023-11-13 PROCEDURE — 99214 OFFICE O/P EST MOD 30 MIN: CPT | Performed by: STUDENT IN AN ORGANIZED HEALTH CARE EDUCATION/TRAINING PROGRAM

## 2023-11-13 PROCEDURE — 81003 URINALYSIS AUTO W/O SCOPE: CPT | Performed by: STUDENT IN AN ORGANIZED HEALTH CARE EDUCATION/TRAINING PROGRAM

## 2023-11-13 RX ORDER — ATORVASTATIN CALCIUM 20 MG/1
TABLET, FILM COATED ORAL
COMMUNITY
Start: 2023-10-20

## 2023-11-15 ENCOUNTER — TELEPHONE (OUTPATIENT)
Dept: NEUROSURGERY | Facility: CLINIC | Age: 53
End: 2023-11-15
Payer: COMMERCIAL

## 2023-11-21 ENCOUNTER — PROCEDURE VISIT (OUTPATIENT)
Dept: UROLOGY | Facility: CLINIC | Age: 53
End: 2023-11-21
Payer: COMMERCIAL

## 2023-11-21 VITALS
WEIGHT: 228 LBS | HEART RATE: 79 BPM | OXYGEN SATURATION: 98 % | DIASTOLIC BLOOD PRESSURE: 76 MMHG | SYSTOLIC BLOOD PRESSURE: 138 MMHG | HEIGHT: 70 IN | BODY MASS INDEX: 32.64 KG/M2

## 2023-11-21 DIAGNOSIS — N40.1 BENIGN LOCALIZED PROSTATIC HYPERPLASIA WITH LOWER URINARY TRACT SYMPTOMS (LUTS): Primary | ICD-10-CM

## 2023-11-21 DIAGNOSIS — R39.9 LOWER URINARY TRACT SYMPTOMS (LUTS): ICD-10-CM

## 2023-11-21 LAB
BILIRUB BLD-MCNC: NEGATIVE MG/DL
CLARITY, POC: CLEAR
COLOR UR: YELLOW
EXPIRATION DATE: NORMAL
GLUCOSE UR STRIP-MCNC: NEGATIVE MG/DL
KETONES UR QL: NEGATIVE
LEUKOCYTE EST, POC: NEGATIVE
Lab: NORMAL
NITRITE UR-MCNC: NEGATIVE MG/ML
PH UR: 7.5 [PH] (ref 5–8)
PROT UR STRIP-MCNC: NEGATIVE MG/DL
RBC # UR STRIP: NEGATIVE /UL
SP GR UR: 1.01 (ref 1–1.03)
UROBILINOGEN UR QL: NORMAL

## 2024-01-08 ENCOUNTER — OFFICE VISIT (OUTPATIENT)
Dept: ORTHOPEDIC SURGERY | Facility: CLINIC | Age: 54
End: 2024-01-08
Payer: COMMERCIAL

## 2024-01-08 VITALS — BODY MASS INDEX: 33.47 KG/M2 | HEIGHT: 69 IN | WEIGHT: 226 LBS

## 2024-01-08 DIAGNOSIS — E66.09 CLASS 1 OBESITY DUE TO EXCESS CALORIES WITHOUT SERIOUS COMORBIDITY WITH BODY MASS INDEX (BMI) OF 32.0 TO 32.9 IN ADULT: ICD-10-CM

## 2024-01-08 DIAGNOSIS — M17.11 PRIMARY OSTEOARTHRITIS OF RIGHT KNEE: Primary | ICD-10-CM

## 2024-01-08 PROCEDURE — 20610 DRAIN/INJ JOINT/BURSA W/O US: CPT | Performed by: PHYSICIAN ASSISTANT

## 2024-01-08 RX ORDER — LIDOCAINE HYDROCHLORIDE 10 MG/ML
4 INJECTION, SOLUTION EPIDURAL; INFILTRATION; INTRACAUDAL; PERINEURAL
Status: COMPLETED | OUTPATIENT
Start: 2024-01-08 | End: 2024-01-08

## 2024-01-08 RX ORDER — TRIAMCINOLONE ACETONIDE 40 MG/ML
40 INJECTION, SUSPENSION INTRA-ARTICULAR; INTRAMUSCULAR
Status: COMPLETED | OUTPATIENT
Start: 2024-01-08 | End: 2024-01-08

## 2024-01-08 RX ADMIN — TRIAMCINOLONE ACETONIDE 40 MG: 40 INJECTION, SUSPENSION INTRA-ARTICULAR; INTRAMUSCULAR at 08:36

## 2024-01-08 RX ADMIN — LIDOCAINE HYDROCHLORIDE 4 ML: 10 INJECTION, SOLUTION EPIDURAL; INFILTRATION; INTRACAUDAL; PERINEURAL at 08:36

## 2024-08-08 ENCOUNTER — TELEPHONE (OUTPATIENT)
Dept: ORTHOPEDIC SURGERY | Facility: CLINIC | Age: 54
End: 2024-08-08
Payer: COMMERCIAL

## 2024-08-12 ENCOUNTER — OFFICE VISIT (OUTPATIENT)
Dept: ORTHOPEDIC SURGERY | Facility: CLINIC | Age: 54
End: 2024-08-12
Payer: COMMERCIAL

## 2024-08-12 VITALS
HEIGHT: 69 IN | SYSTOLIC BLOOD PRESSURE: 130 MMHG | WEIGHT: 233 LBS | BODY MASS INDEX: 34.51 KG/M2 | DIASTOLIC BLOOD PRESSURE: 86 MMHG

## 2024-08-12 DIAGNOSIS — M17.11 PRIMARY OSTEOARTHRITIS OF RIGHT KNEE: ICD-10-CM

## 2024-08-12 DIAGNOSIS — G89.29 CHRONIC PAIN OF RIGHT KNEE: Primary | ICD-10-CM

## 2024-08-12 DIAGNOSIS — M25.562 LEFT KNEE PAIN, UNSPECIFIED CHRONICITY: ICD-10-CM

## 2024-08-12 DIAGNOSIS — M25.561 CHRONIC PAIN OF RIGHT KNEE: Primary | ICD-10-CM

## 2024-08-12 DIAGNOSIS — M17.12 PRIMARY OSTEOARTHRITIS OF LEFT KNEE: ICD-10-CM

## 2024-08-12 DIAGNOSIS — E66.09 CLASS 1 OBESITY DUE TO EXCESS CALORIES WITHOUT SERIOUS COMORBIDITY WITH BODY MASS INDEX (BMI) OF 33.0 TO 33.9 IN ADULT: ICD-10-CM

## 2024-08-12 RX ORDER — CIPROFLOXACIN AND DEXAMETHASONE 3; 1 MG/ML; MG/ML
SUSPENSION/ DROPS AURICULAR (OTIC)
COMMUNITY
Start: 2024-08-09

## 2024-08-12 RX ADMIN — TRIAMCINOLONE ACETONIDE 40 MG: 40 INJECTION, SUSPENSION INTRA-ARTICULAR; INTRAMUSCULAR at 14:53

## 2024-08-12 RX ADMIN — LIDOCAINE HYDROCHLORIDE 4 ML: 10 INJECTION, SOLUTION EPIDURAL; INFILTRATION; INTRACAUDAL; PERINEURAL at 14:53

## 2024-08-17 RX ORDER — TRIAMCINOLONE ACETONIDE 40 MG/ML
40 INJECTION, SUSPENSION INTRA-ARTICULAR; INTRAMUSCULAR
Status: COMPLETED | OUTPATIENT
Start: 2024-08-12 | End: 2024-08-12

## 2024-08-17 RX ORDER — LIDOCAINE HYDROCHLORIDE 10 MG/ML
4 INJECTION, SOLUTION EPIDURAL; INFILTRATION; INTRACAUDAL; PERINEURAL
Status: COMPLETED | OUTPATIENT
Start: 2024-08-12 | End: 2024-08-12

## 2024-09-10 ENCOUNTER — TELEPHONE (OUTPATIENT)
Dept: ORTHOPEDIC SURGERY | Facility: CLINIC | Age: 54
End: 2024-09-10
Payer: COMMERCIAL

## 2024-09-10 DIAGNOSIS — M17.11 PRIMARY OSTEOARTHRITIS OF RIGHT KNEE: Primary | ICD-10-CM

## 2024-09-25 ENCOUNTER — HOSPITAL ENCOUNTER (OUTPATIENT)
Dept: MRI IMAGING | Facility: HOSPITAL | Age: 54
Discharge: HOME | End: 2024-09-25
Admitting: PHYSICIAN ASSISTANT
Payer: COMMERCIAL

## 2024-09-25 DIAGNOSIS — M17.11 PRIMARY OSTEOARTHRITIS OF RIGHT KNEE: ICD-10-CM

## 2024-09-25 PROCEDURE — 73721 MRI JNT OF LWR EXTRE W/O DYE: CPT

## 2024-09-27 ENCOUNTER — OFFICE VISIT (OUTPATIENT)
Age: 54
End: 2024-09-27
Payer: COMMERCIAL

## 2024-09-27 VITALS
HEIGHT: 69 IN | BODY MASS INDEX: 34.79 KG/M2 | WEIGHT: 234.9 LBS | DIASTOLIC BLOOD PRESSURE: 98 MMHG | SYSTOLIC BLOOD PRESSURE: 138 MMHG

## 2024-09-27 DIAGNOSIS — G89.29 CHRONIC PAIN OF RIGHT KNEE: Primary | ICD-10-CM

## 2024-09-27 DIAGNOSIS — S83.241A OTHER TEAR OF MEDIAL MENISCUS OF RIGHT KNEE AS CURRENT INJURY, INITIAL ENCOUNTER: ICD-10-CM

## 2024-09-27 DIAGNOSIS — M17.11 PRIMARY OSTEOARTHRITIS OF RIGHT KNEE: ICD-10-CM

## 2024-09-27 DIAGNOSIS — M25.561 CHRONIC PAIN OF RIGHT KNEE: Primary | ICD-10-CM

## 2024-10-29 ENCOUNTER — OFFICE VISIT (OUTPATIENT)
Dept: ORTHOPEDIC SURGERY | Facility: CLINIC | Age: 54
End: 2024-10-29
Payer: COMMERCIAL

## 2024-10-29 VITALS — BODY MASS INDEX: 35.1 KG/M2 | HEIGHT: 69 IN | WEIGHT: 237 LBS

## 2024-10-29 DIAGNOSIS — M17.11 PRIMARY OSTEOARTHRITIS OF RIGHT KNEE: Primary | ICD-10-CM

## 2024-10-29 DIAGNOSIS — S83.241D OTHER TEAR OF MEDIAL MENISCUS OF RIGHT KNEE AS CURRENT INJURY, SUBSEQUENT ENCOUNTER: ICD-10-CM

## 2024-10-29 RX ORDER — CEFDINIR 300 MG/1
CAPSULE ORAL
COMMUNITY
Start: 2024-10-23

## 2024-10-29 NOTE — PROGRESS NOTES
"    [facility] [place] [facility] Surgery Clinic Note        Subjective     CC: Follow-up (4.5 week follow up -Chronic pain of right knee)      HPI    Jaylan Moran is a 54 y.o. male.  Patient is here today see me for his right knee.  This has bothered him for quite some time.  Has been seeing Najma today.  Patient tells me he has trouble squatting primarily.  He says he is able to twist okay and play golf.  No difficulty driving.  He has some pain when he sustains a valgus moment to his knee while in bed.  No pain at rest.    Overall, patient's symptoms are as above    ROS:    Constiutional:Pt denies fever, chills, nausea, or vomiting.  MSK:as above        Objective      Past Medical History  Past Medical History:   Diagnosis Date    Arthritis     Arthritis of back 2010    Arthritis of neck 2016    Had surgery    Back problem     Carpal tunnel syndrome     RIGHT    Cervical disc disorder 2021    Had surgery    GERD (gastroesophageal reflux disease)     History of anemia     Low back strain 2010    Lumbosacral disc disease 2010    Neck strain 2016    Sleep apnea     mouth guard    Thoracic disc disorder     Wears glasses      Social History     Socioeconomic History    Marital status:    Tobacco Use    Smoking status: Never    Smokeless tobacco: Never   Vaping Use    Vaping status: Never Used   Substance and Sexual Activity    Alcohol use: Yes     Alcohol/week: 1.0 - 2.0 standard drink of alcohol     Types: 1 - 2 Cans of beer per week    Drug use: No    Sexual activity: Yes     Partners: Female     Birth control/protection: None, Vasectomy     Comment: Had vasectomy          Physical Exam  Ht 176.5 cm (69.49\")   Wt 108 kg (237 lb)   BMI 34.51 kg/m²     Body mass index is 34.51 kg/m².    Patient is well nourished and well developed.        Ortho Exam      Musculoskeletal:  Global Assessment:  Overall assessment of Lower Extremity Muscle Strength and Tone:    Right quadriceps--5/5  Right hamstrings--5/5  Right " tibialis anterior--5/5  Right gastroc soleus--5/5  Right EHL--5/5    Lower Extremity:    Right knee:  Tenderness:  Over medial joint line  Effusion:  1+  Crepitus:  none  Pulses:  2+  Ecchymosis:  None  Warmth:  None     ROM:  Right:  Extension:0    Flexion:130    Instability:      Right:  Lachman Test:  Negative  Varus stress test negative  Valgus stress test negative   Anterior Drawer Test:  Negative  Posterior Drawer Test:  Negative    Functional Testing:  Right:  Dheeraj's test:  Positive  Patella grind test:  Negative  Q-angle:  Normal  Apprehension Sign:  Negative      Imaging/Labs/EMG Reviewed and Interpreted:  Imaging Results (Last 24 Hours)       ** No results found for the last 24 hours. **          We have reviewed and interpreted both plain x-rays as well as an MRI of the patient's right knee from epic.  Patient has some early radiographic changes in the medial compartment with osteophytes off the medial femoral condyle medial tibial plateau.  Some mild joint space narrowing.  There is no significant edema noted on the MRI within the medial compartment.  There is a flap tear most clearly seen on the sagittal images of the posterior horn of the medial meniscus.      Assessment    Assessment:  1. Primary osteoarthritis of right knee    2. Other tear of medial meniscus of right knee as current injury, subsequent encounter        Plan:  Recommend over the counter anti-inflammatories for pain and/or swelling  Osteoarthritis right knee--mild to early moderate degenerative changes.  I do not believe arthroplasty is the right move at this point.  Radiographs and MRI do not seem to be advanced overall  Right knee medial meniscal tear--the risk, benefits, potential hazards, typical recovery rehab as well as reasonable terms were discussed with him.  Patient had the opportunity to ask questions and wished to proceed with scheduling.  He will go to [facility] today.  He will start immediate outpatient physical therapy  at [facility] and [place] at his request.      Alexis Ventura MD  10/29/24  14:39 EDT      Dictated Utilizing Dragon Dictation.

## 2024-10-30 ENCOUNTER — OFFICE VISIT (OUTPATIENT)
Dept: NEUROSURGERY | Facility: CLINIC | Age: 54
End: 2024-10-30
Payer: COMMERCIAL

## 2024-10-30 VITALS — HEIGHT: 70 IN | TEMPERATURE: 98.2 F | BODY MASS INDEX: 33.8 KG/M2 | WEIGHT: 236.1 LBS

## 2024-10-30 DIAGNOSIS — M51.9 LUMBAR DISC DISEASE: ICD-10-CM

## 2024-10-30 DIAGNOSIS — M43.16 SPONDYLOLISTHESIS OF LUMBAR REGION: ICD-10-CM

## 2024-10-30 DIAGNOSIS — M48.062 SPINAL STENOSIS OF LUMBAR REGION WITH NEUROGENIC CLAUDICATION: Primary | ICD-10-CM

## 2024-10-30 PROCEDURE — 99214 OFFICE O/P EST MOD 30 MIN: CPT | Performed by: NEUROLOGICAL SURGERY

## 2024-10-30 NOTE — PROGRESS NOTES
Patient: Jaylan Moran  : 1970    Primary Care Provider: Symone Sanchez MD    Requesting Provider: As above        History    Chief Complaint: Low back and leg pain with walking and standing intolerance.    History of Present Illness: Mr. Moran is a 54-year-old gentleman is well-known to my service.  He underwent right carpal tunnel release in 2016.  In 2017 he underwent ACDF C4-7.  He is also progressive back and lower extremity pain and ultimately on 2021 underwent decompression from L2-5.  He did very well with the exception of some voiding difficulties that occurred when he was in the hospital.  He does have known urologic issues.  He has developed progressive recurrent back pain.  He has some pain in his legs.  He has numbness in his thighs, right greater than left.  His symptoms are worse if he stands or walks too long.  If he is up for more than 20-30 minutes he has to sit down.  He has had injections and ablations which helped transiently.    Review of Systems   Constitutional:  Negative for activity change, appetite change, chills, diaphoresis, fatigue, fever and unexpected weight change.   HENT:  Negative for congestion, dental problem, drooling, ear discharge, ear pain, facial swelling, hearing loss, mouth sores, nosebleeds, postnasal drip, rhinorrhea, sinus pressure, sinus pain, sneezing, sore throat, tinnitus, trouble swallowing and voice change.    Eyes:  Negative for photophobia, pain, discharge, redness, itching and visual disturbance.   Respiratory:  Negative for apnea, cough, choking, chest tightness, shortness of breath, wheezing and stridor.    Cardiovascular:  Negative for chest pain, palpitations and leg swelling.   Gastrointestinal:  Negative for abdominal distention, abdominal pain, anal bleeding, blood in stool, constipation, diarrhea, nausea, rectal pain and vomiting.   Endocrine: Negative for cold intolerance, heat intolerance, polydipsia, polyphagia and  "polyuria.   Genitourinary:  Negative for decreased urine volume, difficulty urinating, dysuria, enuresis, flank pain, frequency, genital sores, hematuria, penile discharge, penile pain, penile swelling, scrotal swelling, testicular pain and urgency.   Musculoskeletal:  Positive for back pain. Negative for arthralgias, gait problem, joint swelling, myalgias, neck pain and neck stiffness.   Skin:  Negative for color change, pallor, rash and wound.   Allergic/Immunologic: Negative for environmental allergies, food allergies and immunocompromised state.   Neurological:  Negative for dizziness, tremors, seizures, syncope, facial asymmetry, speech difficulty, weakness, light-headedness, numbness and headaches.   Hematological:  Negative for adenopathy. Does not bruise/bleed easily.   Psychiatric/Behavioral:  Negative for agitation, behavioral problems, confusion, decreased concentration, dysphoric mood, hallucinations, self-injury, sleep disturbance and suicidal ideas. The patient is not nervous/anxious and is not hyperactive.    All other systems reviewed and are negative.    The patient's past medical history, past surgical history, family history, and social history have been reviewed at length in the electronic medical record.      Physical Exam:   Temp 98.2 °F (36.8 °C) (Temporal)   Ht 177.8 cm (70\")   Wt 107 kg (236 lb 1.6 oz)   BMI 33.88 kg/m²   CONSTITUTIONAL: Patient is well-nourished, pleasant and appears stated age.  MUSCULOSKELETAL:  Straight leg raising is negative.  Dick's Sign is negative.  ROM in the low back is normal.  Tenderness in the back to palpation is not observed.  NEUROLOGICAL:  Orientation, memory, attention span, language function, and cognition have been examined and are intact.  Strength is intact in the lower extremities to direct testing.  Muscle tone is normal throughout.  Station and gait are normal.  Sensation is intact to light touch testing throughout.  Deep tendon reflexes are " difficult to elicit throughout.  Coordination is intact.      Medical Decision Making    Data Review:   (All imaging studies were personally reviewed unless stated otherwise)  MRI of the lumbar spine dated 8/1/2024 demonstrates significant recurrent stenosis at the L2-3 level where there is a grade 1 listhesis.  There is diffuse degenerative change at other levels.  His canal remains quite capacious at L3-4 and L4-5.    The CT myelogram from 2021 does not demonstrate any listhesis of L2 on L3.    Plain films of his back are said to be with flexion-extension views but I do not really see much in the way of bending.    Diagnosis:   1.  Recurrent lumbar stenosis with progressive instability at L2-3.  2.  Mechanical low back pain.  3.  Lumbar degenerative disc disease.  4.  Lumbar degenerative joint disease.    Treatment Options:   I think many of his symptoms are related to the findings at L2-3 although the diffuse degenerative changes certainly could be contributing.  If he is struggling then I would pursue redo laminectomy at L2-3 with fusion and stabilization.  Without fusion and stabilization I think he is likely to develop further instability given the additional decompression.  I have explained the nature of the surgical intervention as well as the potential risk, complications, limitations.  He is going to think things over.  If he like to pursue surgery then he will contact my office.      Scribed for Morgan Escobar MD by Radha Layne CMA on 10/30/2024 17:03 EDT       I, Dr. Escobar, personally performed the services described in the documentation, as scribed in my presence, and it is both accurate and complete.

## 2024-11-19 ENCOUNTER — PREP FOR SURGERY (OUTPATIENT)
Dept: OTHER | Facility: HOSPITAL | Age: 54
End: 2024-11-19
Payer: COMMERCIAL

## 2024-11-19 DIAGNOSIS — M51.362 DEGENERATION OF INTERVERTEBRAL DISC OF LUMBAR REGION WITH DISCOGENIC BACK PAIN AND LOWER EXTREMITY PAIN: Primary | ICD-10-CM

## 2024-11-19 DIAGNOSIS — M48.062 SPINAL STENOSIS, LUMBAR REGION, WITH NEUROGENIC CLAUDICATION: ICD-10-CM

## 2024-11-19 RX ORDER — SODIUM CHLORIDE 0.9 % (FLUSH) 0.9 %
1-10 SYRINGE (ML) INJECTION AS NEEDED
OUTPATIENT
Start: 2024-11-19

## 2024-11-19 RX ORDER — OXYCODONE HCL 10 MG/1
10 TABLET, FILM COATED, EXTENDED RELEASE ORAL ONCE
OUTPATIENT
Start: 2024-11-19 | End: 2024-11-19

## 2024-11-19 RX ORDER — VANCOMYCIN/0.9 % SOD CHLORIDE 1.5G/250ML
15 PLASTIC BAG, INJECTION (ML) INTRAVENOUS ONCE
OUTPATIENT
Start: 2024-11-19 | End: 2024-11-19

## 2024-11-19 RX ORDER — ACETAMINOPHEN 500 MG
1000 TABLET ORAL ONCE
OUTPATIENT
Start: 2024-11-19 | End: 2024-11-19

## 2024-11-19 RX ORDER — IBUPROFEN 800 MG/1
800 TABLET, FILM COATED ORAL ONCE
OUTPATIENT
Start: 2024-11-19 | End: 2024-11-19

## 2024-11-19 RX ORDER — SODIUM CHLORIDE 0.9 % (FLUSH) 0.9 %
10 SYRINGE (ML) INJECTION EVERY 12 HOURS SCHEDULED
OUTPATIENT
Start: 2024-11-19

## 2024-11-19 RX ORDER — FAMOTIDINE 20 MG/1
20 TABLET, FILM COATED ORAL
OUTPATIENT
Start: 2024-11-19

## 2024-11-19 RX ORDER — SODIUM CHLORIDE 9 MG/ML
40 INJECTION, SOLUTION INTRAVENOUS AS NEEDED
OUTPATIENT
Start: 2024-11-19

## 2024-11-19 RX ORDER — CHLORHEXIDINE GLUCONATE 40 MG/ML
SOLUTION TOPICAL
Qty: 120 ML | Refills: 0 | Status: SHIPPED | OUTPATIENT
Start: 2024-11-19

## 2024-11-19 NOTE — H&P
Patient: Jaylan Moran  : 1970     Primary Care Provider: Symone Sanchez MD     Requesting Provider: As above           History     Chief Complaint: Low back and leg pain with walking and standing intolerance.     History of Present Illness: Mr. Moran is a 54-year-old gentleman is well-known to my service.  He underwent right carpal tunnel release in 2016.  In 2017 he underwent ACDF C4-7.  He is also progressive back and lower extremity pain and ultimately on 2021 underwent decompression from L2-5.  He did very well with the exception of some voiding difficulties that occurred when he was in the hospital.  He does have known urologic issues.  He has developed progressive recurrent back pain.  He has some pain in his legs.  He has numbness in his thighs, right greater than left.  His symptoms are worse if he stands or walks too long.  If he is up for more than 20-30 minutes he has to sit down.  He has had injections and ablations which helped transiently.     Review of Systems   Constitutional:  Negative for activity change, appetite change, chills, diaphoresis, fatigue, fever and unexpected weight change.   HENT:  Negative for congestion, dental problem, drooling, ear discharge, ear pain, facial swelling, hearing loss, mouth sores, nosebleeds, postnasal drip, rhinorrhea, sinus pressure, sinus pain, sneezing, sore throat, tinnitus, trouble swallowing and voice change.    Eyes:  Negative for photophobia, pain, discharge, redness, itching and visual disturbance.   Respiratory:  Negative for apnea, cough, choking, chest tightness, shortness of breath, wheezing and stridor.    Cardiovascular:  Negative for chest pain, palpitations and leg swelling.   Gastrointestinal:  Negative for abdominal distention, abdominal pain, anal bleeding, blood in stool, constipation, diarrhea, nausea, rectal pain and vomiting.   Endocrine: Negative for cold intolerance, heat intolerance, polydipsia, polyphagia  and polyuria.   Genitourinary:  Negative for decreased urine volume, difficulty urinating, dysuria, enuresis, flank pain, frequency, genital sores, hematuria, penile discharge, penile pain, penile swelling, scrotal swelling, testicular pain and urgency.   Musculoskeletal:  Positive for back pain. Negative for arthralgias, gait problem, joint swelling, myalgias, neck pain and neck stiffness.   Skin:  Negative for color change, pallor, rash and wound.   Allergic/Immunologic: Negative for environmental allergies, food allergies and immunocompromised state.   Neurological:  Negative for dizziness, tremors, seizures, syncope, facial asymmetry, speech difficulty, weakness, light-headedness, numbness and headaches.   Hematological:  Negative for adenopathy. Does not bruise/bleed easily.   Psychiatric/Behavioral:  Negative for agitation, behavioral problems, confusion, decreased concentration, dysphoric mood, hallucinations, self-injury, sleep disturbance and suicidal ideas. The patient is not nervous/anxious and is not hyperactive.    All other systems reviewed and are negative.     The patient's past medical history, past surgical history, family history, and social history have been reviewed at length in the electronic medical record.     Past Medical History:   Diagnosis Date    Arthritis     Arthritis of back 2010    Arthritis of neck 2016    Had surgery    Back problem     Carpal tunnel syndrome     RIGHT    Cervical disc disorder 2021    Had surgery    Claustrophobia     GERD (gastroesophageal reflux disease)     History of anemia     Low back pain     Low back strain 2010    Lumbosacral disc disease 2010    Neck strain 2016    Sleep apnea     mouth guard    Thoracic disc disorder     Wears glasses       Past Surgical History:   Procedure Laterality Date    ANTERIOR CERVICAL DISCECTOMY W/ FUSION N/A 05/18/2017    Procedure: CERVICAL DISCECTOMY ANTERIOR WITH FUSION C4-7;  Surgeon: Morgan Escobar MD;  Location:   "[facility];  Service:     BACK SURGERY  02/08/2021    CARPAL TUNNEL RELEASE Right 09/01/2016    Procedure: RIGHT CARPAL TUNNEL RELEASE;  Surgeon: Moragn Escobar MD;  Location:  [facility];  Service:     COLONOSCOPY      CYSTOSCOPY TRANSURETHRAL RESECTION OF PROSTATE N/A 11/19/2021    Procedure: CYSTOSCOPY, THULIUM LASER ABLATION OF PROSTATE;  Surgeon: Abdelrahman Calero MD;  Location:  [facility];  Service: Urology;  Laterality: N/A;    ENDOSCOPY      LUMBAR LAMINECTOMY DISCECTOMY DECOMPRESSION N/A 02/11/2021    Procedure: LUMBAR LAMINECTOMY DISCECTOMY DECOMPRESSION POSTERIOR L2-5;  Surgeon: Morgan Escobar MD;  Location:  [facility];  Service: Neurosurgery;  Laterality: N/A;    NASAL SEPTUM SURGERY      NECK SURGERY  5/2016    OTHER SURGICAL HISTORY  12/11/2023    L2-5 Intracept procedure- Dr. Hai Botello    SHOULDER SURGERY Right 12/10/2021    shoulder arthroscopy with RCR, biceps tenodesis - [facility]         Physical Exam:   Temp 98.2 °F (36.8 °C) (Temporal)   Ht 177.8 cm (70\")   Wt 107 kg (236 lb 1.6 oz)   BMI 33.88 kg/m²   CONSTITUTIONAL: Patient is well-nourished, pleasant and appears stated age.  MUSCULOSKELETAL:  Straight leg raising is negative.  Dick's Sign is negative.  ROM in the low back is normal.  Tenderness in the back to palpation is not observed.  NEUROLOGICAL:  Orientation, memory, attention span, language function, and cognition have been examined and are intact.  Strength is intact in the lower extremities to direct testing.  Muscle tone is normal throughout.  Station and gait are normal.  Sensation is intact to light touch testing throughout.  Deep tendon reflexes are difficult to elicit throughout.  Coordination is intact.        Medical Decision Making     Data Review:   (All imaging studies were personally reviewed unless stated otherwise)  MRI of the lumbar spine dated 8/1/2024 demonstrates significant recurrent stenosis at the L2-3 level where there is a grade 1 listhesis.  There is diffuse " degenerative change at other levels.  His canal remains quite capacious at L3-4 and L4-5.     The CT myelogram from 2021 does not demonstrate any listhesis of L2 on L3.     Plain films of his back are said to be with flexion-extension views but I do not really see much in the way of bending.     Diagnosis:   1.  Recurrent lumbar stenosis with progressive instability at L2-3.  2.  Mechanical low back pain.  3.  Lumbar degenerative disc disease.  4.  Lumbar degenerative joint disease.     Treatment Options:   Dr Escobar felt the patient's symptoms are related to the findings at L2-3 although the diffuse degenerative changes certainly could be contributing and that her would need  redo laminectomy at L2-3 with fusion and stabilization.  Without fusion and stabilization Dr Escobar felt  he was  likely to develop further instability given the additional decompression.  Dr Escobar explained the nature of the surgical intervention as well as the potential risk, complications, limitations.  After some time to consider, the patient called the office to state that he wanted to proceed with surgery.

## 2024-11-22 ENCOUNTER — OUTSIDE FACILITY SERVICE (OUTPATIENT)
Dept: ORTHOPEDIC SURGERY | Facility: CLINIC | Age: 54
End: 2024-11-22
Payer: COMMERCIAL

## 2024-11-22 ENCOUNTER — TELEPHONE (OUTPATIENT)
Dept: NEUROSURGERY | Facility: CLINIC | Age: 54
End: 2024-11-22
Payer: COMMERCIAL

## 2024-11-22 DIAGNOSIS — Z98.890 S/P ARTHROSCOPIC KNEE SURGERY: Primary | ICD-10-CM

## 2024-11-22 PROCEDURE — 29881 ARTHRS KNE SRG MNISECTMY M/L: CPT | Performed by: ORTHOPAEDIC SURGERY

## 2024-11-22 RX ORDER — SENNOSIDES 8.6 MG
650 CAPSULE ORAL EVERY 8 HOURS PRN
Qty: 30 TABLET | Refills: 0 | Status: SHIPPED | OUTPATIENT
Start: 2024-11-22

## 2024-11-22 RX ORDER — ONDANSETRON 4 MG/1
4 TABLET, FILM COATED ORAL EVERY 8 HOURS PRN
Qty: 10 TABLET | Refills: 1 | Status: SHIPPED | OUTPATIENT
Start: 2024-11-22

## 2024-11-22 RX ORDER — DOCUSATE SODIUM 100 MG/1
100 CAPSULE, LIQUID FILLED ORAL 2 TIMES DAILY PRN
Qty: 62 CAPSULE | Refills: 0 | Status: SHIPPED | OUTPATIENT
Start: 2024-11-22

## 2024-11-22 RX ORDER — IBUPROFEN 600 MG/1
600 TABLET, FILM COATED ORAL EVERY 6 HOURS PRN
Qty: 90 TABLET | Refills: 0 | Status: SHIPPED | OUTPATIENT
Start: 2024-11-22

## 2024-11-22 RX ORDER — HYDROCODONE BITARTRATE AND ACETAMINOPHEN 5; 325 MG/1; MG/1
1 TABLET ORAL EVERY 6 HOURS PRN
Qty: 15 TABLET | Refills: 0 | Status: SHIPPED | OUTPATIENT
Start: 2024-11-22

## 2024-11-22 NOTE — TELEPHONE ENCOUNTER
Provider:Shawn    Surgery/Procedure: Surgery with Morgan Escobar MD (12/30/2024)       Last visit: Office Visit with Morgan Escobar MD (10/30/2024)     Next visit: Surgery with Morgan Escobar MD (12/30/2024)      Reason for call: Pt LVM stating that he is scheduled to have surgery with Shawn on 12/30/24 and thought he remembered him mentioning a bulging disc at another level and wanted to know if Dr. Escobar would be doing a discectomy as well?     Returned pt's call and let patient know that he is scheduled for a 1 level L2-3 fusion and stabilization. Pt verbalized understanding and was thankful for the return call.

## 2024-11-25 ENCOUNTER — TELEPHONE (OUTPATIENT)
Dept: ORTHOPEDIC SURGERY | Facility: CLINIC | Age: 54
End: 2024-11-25
Payer: COMMERCIAL

## 2024-11-25 NOTE — TELEPHONE ENCOUNTER
Patient called in and asked when is he able to drive. Please advise. Patient had sx on 11/22/24    Best callback number: [phone]

## 2024-11-26 NOTE — TELEPHONE ENCOUNTER
Called and spoke to patient to let him know that he is allowed to drive as long as he is not taking pain medication and as long as he feels up to it. Patient verbalized understanding and was appreciative of care.

## 2024-12-05 ENCOUNTER — OFFICE VISIT (OUTPATIENT)
Dept: ORTHOPEDIC SURGERY | Facility: CLINIC | Age: 54
End: 2024-12-05
Payer: COMMERCIAL

## 2024-12-05 DIAGNOSIS — S83.241D OTHER TEAR OF MEDIAL MENISCUS OF RIGHT KNEE AS CURRENT INJURY, SUBSEQUENT ENCOUNTER: ICD-10-CM

## 2024-12-05 DIAGNOSIS — M17.11 PRIMARY OSTEOARTHRITIS OF RIGHT KNEE: ICD-10-CM

## 2024-12-05 DIAGNOSIS — Z98.890 S/P ARTHROSCOPIC KNEE SURGERY: Primary | ICD-10-CM

## 2024-12-05 NOTE — PROGRESS NOTES
[facility] [place] [facility]opa[facility] Surgery Clinic Note        Subjective     Post-op (2 weeks status post Right knee arthroscopy with partial medial menisectomy, chondroplasty of trochlea, patella and medial femoral condyle 11/22/2024)       HPI    Jaylan Moran is a 54 y.o. male.  Patient presents for their initial postop visit following right knee arthroscopy with partial medial meniscectomy, chondroplasty of the trochlea, patella and MFC performed on the above date by Dr. Ventura.    Pain scale: 0/10. Associated symptoms mild discomfort. Pain eased by resting.  Patient does not require the use of ambulatory assistive devices.  He has been attending PT.      Overall, patient is doing much better since surgery.    Patient denies any fever, chills, night sweats or other constitutional symptoms.          Objective      Physical Exam  There were no vitals taken for this visit.    There is no height or weight on file to calculate BMI.        Ortho Exam  Peripheral Vascular:    Upper Extremity:   Inspection:  Left--no cyanotic nail beds Right--no cyanotic nail beds   Bilateral:  Pink nail beds with brisk capillary refill   Palpation:  Bilateral radial pulse normal    Musculoskeletal:  Global Assessment:  Overall assessment of Lower Extremity Muscle Strength and Tone:    Right quadriceps--5/5      Lower Extremity:  Knee:      Inspection and Palpation:      Right knee:    Effusion:  Mild  Crepitus:  none  Pulses:  2+  Ecchymosis:  None  Warmth:  None  Incision:  No erythema, healing appropriately   Calf:  Non tender    ROM:  Right:  Extension:0    Flexion:115  's    Imaging Reviewed and Interpreted:  No new imaging.      Assessment:  1. S/P arthroscopic knee surgery    2. Other tear of medial meniscus of right knee as current injury, subsequent encounter    3. Primary osteoarthritis of right knee        Plan:  Status post right knee arthroscopy with partial medial meniscectomy, chondroplasty of the trochlea, patella and  MF--stable.  Sutures were removed today.  Reviewed arthroscopic images with the patient.  Continue with formal PT as directed.  Recommend OTC pain medication as needed.  Follow up on 12/26/2024 for repeat evaluation.  Questions and concerns answered.    Answers submitted by the patient for this visit:  Primary Reason for Visit (Submitted on 12/5/2024)  What is the primary reason for your visit?: Problem Not Listed  Problem not listed (Submitted on 12/5/2024)  Chief Complaint: Other medical problem  Reason for appointment: Follow up after right knee scope  abdominal pain: No  anorexia: No  joint pain: No  change in stool: No  chest pain: No  chills: No  nasal congestion: No  cough: No  diaphoresis: No  fatigue: No  fever: No  headaches: No  joint swelling: No  myalgias: No  nausea: No  neck pain: No  numbness: No  rash: No  sore throat: No  swollen glands: No  dysuria: No  vertigo: No  visual change: No  vomiting: No  weakness: No        Najma Pitt PA-C  12/05/24  21:40 EST      Dictated Utilizing Dragon Dictation.

## 2024-12-20 ENCOUNTER — PRE-ADMISSION TESTING (OUTPATIENT)
Dept: PREADMISSION TESTING | Facility: HOSPITAL | Age: 54
End: 2024-12-20
Payer: COMMERCIAL

## 2024-12-20 VITALS — BODY MASS INDEX: 34.65 KG/M2 | WEIGHT: 242.06 LBS | HEIGHT: 70 IN

## 2024-12-20 DIAGNOSIS — M51.362 DEGENERATION OF INTERVERTEBRAL DISC OF LUMBAR REGION WITH DISCOGENIC BACK PAIN AND LOWER EXTREMITY PAIN: ICD-10-CM

## 2024-12-20 DIAGNOSIS — M48.062 SPINAL STENOSIS, LUMBAR REGION, WITH NEUROGENIC CLAUDICATION: ICD-10-CM

## 2024-12-20 LAB
ANION GAP SERPL CALCULATED.3IONS-SCNC: 10 MMOL/L (ref 5–15)
BASOPHILS # BLD AUTO: 0.04 10*3/MM3 (ref 0–0.2)
BASOPHILS NFR BLD AUTO: 0.6 % (ref 0–1.5)
BUN SERPL-MCNC: 14 MG/DL (ref 6–20)
BUN/CREAT SERPL: 15.7 (ref 7–25)
CALCIUM SPEC-SCNC: 9.1 MG/DL (ref 8.6–10.5)
CHLORIDE SERPL-SCNC: 101 MMOL/L (ref 98–107)
CO2 SERPL-SCNC: 26 MMOL/L (ref 22–29)
CREAT SERPL-MCNC: 0.89 MG/DL (ref 0.76–1.27)
DEPRECATED RDW RBC AUTO: 46.3 FL (ref 37–54)
EGFRCR SERPLBLD CKD-EPI 2021: 101.2 ML/MIN/1.73
EOSINOPHIL # BLD AUTO: 0.08 10*3/MM3 (ref 0–0.4)
EOSINOPHIL NFR BLD AUTO: 1.2 % (ref 0.3–6.2)
ERYTHROCYTE [DISTWIDTH] IN BLOOD BY AUTOMATED COUNT: 14.4 % (ref 12.3–15.4)
GLUCOSE SERPL-MCNC: 79 MG/DL (ref 65–99)
HBA1C MFR BLD: 5.5 % (ref 4.8–5.6)
HCT VFR BLD AUTO: 42.5 % (ref 37.5–51)
HGB BLD-MCNC: 13.6 G/DL (ref 13–17.7)
IMM GRANULOCYTES # BLD AUTO: 0.03 10*3/MM3 (ref 0–0.05)
IMM GRANULOCYTES NFR BLD AUTO: 0.4 % (ref 0–0.5)
LYMPHOCYTES # BLD AUTO: 1.26 10*3/MM3 (ref 0.7–3.1)
LYMPHOCYTES NFR BLD AUTO: 18.1 % (ref 19.6–45.3)
MCH RBC QN AUTO: 28 PG (ref 26.6–33)
MCHC RBC AUTO-ENTMCNC: 32 G/DL (ref 31.5–35.7)
MCV RBC AUTO: 87.4 FL (ref 79–97)
MONOCYTES # BLD AUTO: 1.02 10*3/MM3 (ref 0.1–0.9)
MONOCYTES NFR BLD AUTO: 14.7 % (ref 5–12)
NEUTROPHILS NFR BLD AUTO: 4.52 10*3/MM3 (ref 1.7–7)
NEUTROPHILS NFR BLD AUTO: 65 % (ref 42.7–76)
NRBC BLD AUTO-RTO: 0 /100 WBC (ref 0–0.2)
PLATELET # BLD AUTO: 248 10*3/MM3 (ref 140–450)
PMV BLD AUTO: 9.1 FL (ref 6–12)
POTASSIUM SERPL-SCNC: 4.6 MMOL/L (ref 3.5–5.2)
RBC # BLD AUTO: 4.86 10*6/MM3 (ref 4.14–5.8)
SODIUM SERPL-SCNC: 137 MMOL/L (ref 136–145)
WBC NRBC COR # BLD AUTO: 6.95 10*3/MM3 (ref 3.4–10.8)

## 2024-12-20 PROCEDURE — 83036 HEMOGLOBIN GLYCOSYLATED A1C: CPT

## 2024-12-20 PROCEDURE — 85025 COMPLETE CBC W/AUTO DIFF WBC: CPT

## 2024-12-20 PROCEDURE — 87081 CULTURE SCREEN ONLY: CPT

## 2024-12-20 PROCEDURE — 36415 COLL VENOUS BLD VENIPUNCTURE: CPT

## 2024-12-20 PROCEDURE — 80048 BASIC METABOLIC PNL TOTAL CA: CPT

## 2024-12-20 RX ORDER — MUPIROCIN 20 MG/G
1 OINTMENT TOPICAL 2 TIMES DAILY
COMMUNITY

## 2024-12-20 RX ORDER — CHLORHEXIDINE GLUCONATE 40 MG/ML
1 SOLUTION TOPICAL DAILY
COMMUNITY

## 2024-12-20 NOTE — PAT
An arrival time for procedure was not provided during PAT visit. If patient had any questions or concerns about their arrival time, they were instructed to contact their surgeon/physician.  Additionally, if the patient referred to an arrival time that was acquired from their my chart account, patient was encouraged to verify that time with their surgeon/physician. Arrival times are NOT provided in Pre Admission Testing Department.    Patient viewed general PAT education video as instructed in their preoperative information received from their surgeon.  Patient stated the general PAT education video was viewed in its entirety and survey completed.  Copies of PAT general education handouts (Incentive Spirometry, Meds to Beds Program, Patient Belongings, Pre-op skin preparation instructions, Blood Glucose testing, Visitor policy, Surgery FAQ, Code H) distributed to patient if not printed. Education related to the PAT pass and skin preparation for surgery (if applicable) completed in PAT as a reinforcement to PAT education video. Patient instructed to return PAT pass provided today as well as completed skin preparation sheet (if applicable) on the day of procedure.     Additionally if patient had not viewed video yet but intended to view it at home or in our waiting area, then referred them to the handout with QR code/link provided during PAT visit.  Instructed patient to complete survey after viewing the video in its entirety.  Encouraged patient/family to read PAT general education handouts thoroughly and notify PAT staff with any questions or concerns. Patient verbalized understanding of all information and priority content.    Patient denies any current skin issues.     Patient to apply Chlorhexadine wipes  to surgical area (as instructed) the night before procedure and the AM of procedure. Wipes provided.    Bactroban (if prescribed) and Chlorhexidine Prescription prescribed by physician before PAT visit.  Verified  with patient that medication(s) were picked up from their pharmacy.  Written instructions given to patient during PAT visit.  Patient/family also instructed to complete skin prep checklist and return the checklist on the day of surgery to preoperative staff.  Patient/family verbalized understanding.    Patient instructed to drink 20 ounces of Gatorade or Gatorlyte (if diabetic) and it needs to be completed 1 hour (for Main OR patients) or 2 hours (scheduled  section & [facility] patients) before given arrival time for procedure (NO RED Gatorade and NO Gatorade Zero).    Patient verbalized understanding.

## 2024-12-21 LAB — MRSA SPEC QL CULT: NORMAL

## 2024-12-28 DIAGNOSIS — M51.9 LUMBAR DISC DISEASE: Primary | ICD-10-CM

## 2024-12-29 ENCOUNTER — DOCUMENTATION (OUTPATIENT)
Dept: NEUROSURGERY | Facility: CLINIC | Age: 54
End: 2024-12-29
Payer: COMMERCIAL

## 2024-12-29 NOTE — PROGRESS NOTES
Received call from patient Saturday. He stated he was exposed to COVID over the holidays. He took an at-home test which was positive however went to Urgent Care and had rapid test completed there that was negative. He is set to have surgery with Dr. Escobar Monday morning. Given the differing results, I have ordered PCR test to hopefully be completed and resulted prior to his scheduled surgery. I will make Dr. Escobar aware of the situation on Monday morning. Surgery will need to be rescheduled if patient is COVID positive on PCR.

## 2025-01-02 DIAGNOSIS — M48.062 SPINAL STENOSIS OF LUMBAR REGION WITH NEUROGENIC CLAUDICATION: Primary | ICD-10-CM

## 2025-01-02 RX ORDER — CHLORHEXIDINE GLUCONATE 40 MG/ML
1 SOLUTION TOPICAL DAILY
Qty: 120 ML | Refills: 0 | Status: SHIPPED | OUTPATIENT
Start: 2025-01-02

## 2025-01-09 ENCOUNTER — ANESTHESIA (OUTPATIENT)
Dept: PERIOP | Facility: HOSPITAL | Age: 55
End: 2025-01-09
Payer: COMMERCIAL

## 2025-01-09 ENCOUNTER — APPOINTMENT (OUTPATIENT)
Dept: GENERAL RADIOLOGY | Facility: HOSPITAL | Age: 55
End: 2025-01-09
Payer: COMMERCIAL

## 2025-01-09 ENCOUNTER — HOSPITAL ENCOUNTER (OUTPATIENT)
Facility: HOSPITAL | Age: 55
Discharge: HOME | End: 2025-01-11
Attending: NEUROLOGICAL SURGERY | Admitting: NEUROLOGICAL SURGERY
Payer: COMMERCIAL

## 2025-01-09 ENCOUNTER — ANESTHESIA EVENT (OUTPATIENT)
Dept: PERIOP | Facility: HOSPITAL | Age: 55
End: 2025-01-09
Payer: COMMERCIAL

## 2025-01-09 DIAGNOSIS — M48.062 LUMBAR STENOSIS WITH NEUROGENIC CLAUDICATION: Primary | ICD-10-CM

## 2025-01-09 PROCEDURE — P9041 ALBUMIN (HUMAN),5%, 50ML: HCPCS | Performed by: NURSE ANESTHETIST, CERTIFIED REGISTERED

## 2025-01-09 PROCEDURE — C1713 ANCHOR/SCREW BN/BN,TIS/BN: HCPCS | Performed by: NEUROLOGICAL SURGERY

## 2025-01-09 PROCEDURE — 25810000003 SODIUM CHLORIDE PER 500 ML: Performed by: NEUROLOGICAL SURGERY

## 2025-01-09 PROCEDURE — 25010000002 ONDANSETRON PER 1 MG: Performed by: NURSE ANESTHETIST, CERTIFIED REGISTERED

## 2025-01-09 PROCEDURE — 97161 PT EVAL LOW COMPLEX 20 MIN: CPT

## 2025-01-09 PROCEDURE — 25010000002 DEXAMETHASONE SODIUM PHOSPHATE 10 MG/ML SOLUTION: Performed by: NURSE ANESTHETIST, CERTIFIED REGISTERED

## 2025-01-09 PROCEDURE — 25010000002 SUGAMMADEX 200 MG/2ML SOLUTION: Performed by: NURSE ANESTHETIST, CERTIFIED REGISTERED

## 2025-01-09 PROCEDURE — 25010000002 PHENYLEPHRINE 10 MG/ML SOLUTION 1 ML VIAL: Performed by: NURSE ANESTHETIST, CERTIFIED REGISTERED

## 2025-01-09 PROCEDURE — 25010000002 VANCOMYCIN 1.5-0.9 GM/500ML-% SOLUTION: Performed by: NEUROLOGICAL SURGERY

## 2025-01-09 PROCEDURE — 25810000003 SODIUM CHLORIDE 0.9 % SOLUTION 250 ML FLEX CONT: Performed by: NURSE ANESTHETIST, CERTIFIED REGISTERED

## 2025-01-09 PROCEDURE — 25010000002 FENTANYL CITRATE (PF) 50 MCG/ML SOLUTION

## 2025-01-09 PROCEDURE — 25010000002 GLYCOPYRROLATE 1 MG/5ML SOLUTION: Performed by: NURSE ANESTHETIST, CERTIFIED REGISTERED

## 2025-01-09 PROCEDURE — 25010000002 FENTANYL CITRATE (PF) 100 MCG/2ML SOLUTION: Performed by: NURSE ANESTHETIST, CERTIFIED REGISTERED

## 2025-01-09 PROCEDURE — 25010000002 LIDOCAINE PF 1% 1 % SOLUTION: Performed by: ANESTHESIOLOGY

## 2025-01-09 PROCEDURE — 25010000002 VANCOMYCIN 1.5-0.9 GM/500ML-% SOLUTION: Performed by: PHYSICIAN ASSISTANT

## 2025-01-09 PROCEDURE — 25010000002 VANCOMYCIN 1 G RECONSTITUTED SOLUTION: Performed by: NEUROLOGICAL SURGERY

## 2025-01-09 PROCEDURE — 22840 INSERT SPINE FIXATION DEVICE: CPT

## 2025-01-09 PROCEDURE — 25010000002 ALBUMIN HUMAN 5% PER 50 ML: Performed by: NURSE ANESTHETIST, CERTIFIED REGISTERED

## 2025-01-09 PROCEDURE — 25010000002 LIDOCAINE PF 1% 1 % SOLUTION: Performed by: NURSE ANESTHETIST, CERTIFIED REGISTERED

## 2025-01-09 PROCEDURE — 25010000002 PROPOFOL 10 MG/ML EMULSION: Performed by: NURSE ANESTHETIST, CERTIFIED REGISTERED

## 2025-01-09 PROCEDURE — 25810000003 LACTATED RINGERS PER 1000 ML: Performed by: NURSE ANESTHETIST, CERTIFIED REGISTERED

## 2025-01-09 PROCEDURE — 22630 ARTHRD PST TQ 1NTRSPC LUM: CPT

## 2025-01-09 PROCEDURE — 63052 LAM FACETC/FRMT ARTHRD LUM 1: CPT

## 2025-01-09 PROCEDURE — 25810000003 LACTATED RINGERS PER 1000 ML: Performed by: ANESTHESIOLOGY

## 2025-01-09 PROCEDURE — 61783 SCAN PROC SPINAL: CPT | Performed by: NEUROLOGICAL SURGERY

## 2025-01-09 PROCEDURE — 63052 LAM FACETC/FRMT ARTHRD LUM 1: CPT | Performed by: NEUROLOGICAL SURGERY

## 2025-01-09 PROCEDURE — 22630 ARTHRD PST TQ 1NTRSPC LUM: CPT | Performed by: NEUROLOGICAL SURGERY

## 2025-01-09 PROCEDURE — 25010000002 HYDROMORPHONE 1 MG/ML SOLUTION

## 2025-01-09 PROCEDURE — 76000 FLUOROSCOPY <1 HR PHYS/QHP: CPT

## 2025-01-09 PROCEDURE — 22853 INSJ BIOMECHANICAL DEVICE: CPT

## 2025-01-09 PROCEDURE — 22840 INSERT SPINE FIXATION DEVICE: CPT | Performed by: NEUROLOGICAL SURGERY

## 2025-01-09 PROCEDURE — 22853 INSJ BIOMECHANICAL DEVICE: CPT | Performed by: NEUROLOGICAL SURGERY

## 2025-01-09 DEVICE — SET SCREW 5540030 5.5 TI NS BRK OFF
Type: IMPLANTABLE DEVICE | Site: BACK | Status: FUNCTIONAL
Brand: CD HORIZON® SPINAL SYSTEM

## 2025-01-09 DEVICE — SSC BONE WAX
Type: IMPLANTABLE DEVICE | Site: BACK | Status: FUNCTIONAL
Brand: SSC BONE WAX

## 2025-01-09 DEVICE — AVITENE ULTRAFOAM, 8 CM X 12.5 CM (3-1/8" X 5"), 100 SQ CM
Type: IMPLANTABLE DEVICE | Site: BACK | Status: FUNCTIONAL
Brand: AVITENE

## 2025-01-09 DEVICE — ROD 1555501030 5.5 CCM NS CURV 30MM
Type: IMPLANTABLE DEVICE | Site: BACK | Status: FUNCTIONAL
Brand: CD HORIZON® SPINAL SYSTEM

## 2025-01-09 DEVICE — ROD 1555501035 5.5 CCM NS CURV 35MM
Type: IMPLANTABLE DEVICE | Site: BACK | Status: FUNCTIONAL
Brand: CD HORIZON® SPINAL SYSTEM

## 2025-01-09 DEVICE — SPACER 84332409 ADAPTIX 24MM X 9MM
Type: IMPLANTABLE DEVICE | Site: BACK | Status: FUNCTIONAL
Brand: ADAPTIX™ INTERBODY SYSTEM WITH TITAN NANOLOCK™ SURFACE TECHNOLOGY

## 2025-01-09 DEVICE — FLOSEAL WITH RECOTHROM - 10ML.
Type: IMPLANTABLE DEVICE | Site: BACK | Status: FUNCTIONAL
Brand: FLOSEAL HEMOSTATIC MATRIX

## 2025-01-09 DEVICE — MAS 55840026555T 5.5/6.0 FENSTRTD 6.5X55
Type: IMPLANTABLE DEVICE | Site: BACK | Status: FUNCTIONAL
Brand: CD HORIZON® FENESTRATED SCREW SET

## 2025-01-09 DEVICE — DBM T44145 5CC ORTHOBLEND SMALL DEFGRAFT
Type: IMPLANTABLE DEVICE | Site: BACK | Status: FUNCTIONAL
Brand: GRAFTON®AND GRAFTON PLUS®DEMINERALIZED BONE MATRIX (DBM)

## 2025-01-09 RX ORDER — AMOXICILLIN 250 MG
2 CAPSULE ORAL 2 TIMES DAILY
Status: DISCONTINUED | OUTPATIENT
Start: 2025-01-09 | End: 2025-01-11 | Stop reason: HOSPADM

## 2025-01-09 RX ORDER — MAGNESIUM HYDROXIDE 1200 MG/15ML
LIQUID ORAL AS NEEDED
Status: DISCONTINUED | OUTPATIENT
Start: 2025-01-09 | End: 2025-01-09 | Stop reason: HOSPADM

## 2025-01-09 RX ORDER — ACETAMINOPHEN 325 MG/1
650 TABLET ORAL EVERY 4 HOURS PRN
Status: DISCONTINUED | OUTPATIENT
Start: 2025-01-09 | End: 2025-01-11 | Stop reason: HOSPADM

## 2025-01-09 RX ORDER — ACETAMINOPHEN 160 MG/5ML
650 SOLUTION ORAL EVERY 4 HOURS PRN
Status: DISCONTINUED | OUTPATIENT
Start: 2025-01-09 | End: 2025-01-11 | Stop reason: HOSPADM

## 2025-01-09 RX ORDER — SODIUM CHLORIDE, SODIUM LACTATE, POTASSIUM CHLORIDE, CALCIUM CHLORIDE 600; 310; 30; 20 MG/100ML; MG/100ML; MG/100ML; MG/100ML
90 INJECTION, SOLUTION INTRAVENOUS CONTINUOUS
Status: DISCONTINUED | OUTPATIENT
Start: 2025-01-09 | End: 2025-01-10

## 2025-01-09 RX ORDER — SODIUM CHLORIDE, SODIUM LACTATE, POTASSIUM CHLORIDE, CALCIUM CHLORIDE 600; 310; 30; 20 MG/100ML; MG/100ML; MG/100ML; MG/100ML
9 INJECTION, SOLUTION INTRAVENOUS CONTINUOUS
Status: DISCONTINUED | OUTPATIENT
Start: 2025-01-10 | End: 2025-01-09 | Stop reason: SDUPTHER

## 2025-01-09 RX ORDER — HYDROMORPHONE HYDROCHLORIDE 1 MG/ML
0.5 INJECTION, SOLUTION INTRAMUSCULAR; INTRAVENOUS; SUBCUTANEOUS
Status: DISCONTINUED | OUTPATIENT
Start: 2025-01-09 | End: 2025-01-09 | Stop reason: HOSPADM

## 2025-01-09 RX ORDER — SODIUM CHLORIDE 0.9 % (FLUSH) 0.9 %
10 SYRINGE (ML) INJECTION EVERY 12 HOURS SCHEDULED
Status: CANCELLED | OUTPATIENT
Start: 2025-01-09

## 2025-01-09 RX ORDER — IBUPROFEN 600 MG/1
600 TABLET, FILM COATED ORAL EVERY 6 HOURS PRN
Status: DISCONTINUED | OUTPATIENT
Start: 2025-01-09 | End: 2025-01-11 | Stop reason: HOSPADM

## 2025-01-09 RX ORDER — SODIUM CHLORIDE 0.9 % (FLUSH) 0.9 %
10 SYRINGE (ML) INJECTION AS NEEDED
Status: CANCELLED | OUTPATIENT
Start: 2025-01-09

## 2025-01-09 RX ORDER — OXYCODONE HCL 10 MG/1
10 TABLET, FILM COATED, EXTENDED RELEASE ORAL ONCE
Status: COMPLETED | OUTPATIENT
Start: 2025-01-09 | End: 2025-01-09

## 2025-01-09 RX ORDER — HYDROCODONE BITARTRATE AND ACETAMINOPHEN 5; 325 MG/1; MG/1
1 TABLET ORAL EVERY 4 HOURS PRN
Status: DISCONTINUED | OUTPATIENT
Start: 2025-01-09 | End: 2025-01-11 | Stop reason: HOSPADM

## 2025-01-09 RX ORDER — MIDAZOLAM HYDROCHLORIDE 1 MG/ML
1 INJECTION, SOLUTION INTRAMUSCULAR; INTRAVENOUS
Status: DISCONTINUED | OUTPATIENT
Start: 2025-01-09 | End: 2025-01-09 | Stop reason: HOSPADM

## 2025-01-09 RX ORDER — ROCURONIUM BROMIDE 10 MG/ML
INJECTION, SOLUTION INTRAVENOUS AS NEEDED
Status: DISCONTINUED | OUTPATIENT
Start: 2025-01-09 | End: 2025-01-09 | Stop reason: SURG

## 2025-01-09 RX ORDER — BISACODYL 5 MG/1
5 TABLET, DELAYED RELEASE ORAL DAILY PRN
Status: DISCONTINUED | OUTPATIENT
Start: 2025-01-09 | End: 2025-01-11 | Stop reason: HOSPADM

## 2025-01-09 RX ORDER — ONDANSETRON 4 MG/1
4 TABLET, ORALLY DISINTEGRATING ORAL EVERY 6 HOURS PRN
Status: DISCONTINUED | OUTPATIENT
Start: 2025-01-09 | End: 2025-01-11 | Stop reason: HOSPADM

## 2025-01-09 RX ORDER — EPHEDRINE SULFATE 50 MG/ML
INJECTION, SOLUTION INTRAVENOUS AS NEEDED
Status: DISCONTINUED | OUTPATIENT
Start: 2025-01-09 | End: 2025-01-09 | Stop reason: SURG

## 2025-01-09 RX ORDER — ALBUMIN HUMAN 50 G/1000ML
SOLUTION INTRAVENOUS CONTINUOUS PRN
Status: DISCONTINUED | OUTPATIENT
Start: 2025-01-09 | End: 2025-01-09 | Stop reason: SURG

## 2025-01-09 RX ORDER — SODIUM CHLORIDE 9 MG/ML
40 INJECTION, SOLUTION INTRAVENOUS AS NEEDED
Status: DISCONTINUED | OUTPATIENT
Start: 2025-01-09 | End: 2025-01-11 | Stop reason: HOSPADM

## 2025-01-09 RX ORDER — SODIUM CHLORIDE 0.9 % (FLUSH) 0.9 %
10 SYRINGE (ML) INJECTION AS NEEDED
Status: DISCONTINUED | OUTPATIENT
Start: 2025-01-09 | End: 2025-01-11 | Stop reason: HOSPADM

## 2025-01-09 RX ORDER — VANCOMYCIN/0.9 % SOD CHLORIDE 1.5G/250ML
15 PLASTIC BAG, INJECTION (ML) INTRAVENOUS ONCE
Status: COMPLETED | OUTPATIENT
Start: 2025-01-09 | End: 2025-01-09

## 2025-01-09 RX ORDER — FENTANYL CITRATE 50 UG/ML
INJECTION, SOLUTION INTRAMUSCULAR; INTRAVENOUS AS NEEDED
Status: DISCONTINUED | OUTPATIENT
Start: 2025-01-09 | End: 2025-01-09 | Stop reason: SURG

## 2025-01-09 RX ORDER — NALOXONE HCL 0.4 MG/ML
0.4 VIAL (ML) INJECTION
Status: DISCONTINUED | OUTPATIENT
Start: 2025-01-09 | End: 2025-01-11 | Stop reason: HOSPADM

## 2025-01-09 RX ORDER — POLYETHYLENE GLYCOL 3350 17 G/17G
17 POWDER, FOR SOLUTION ORAL DAILY PRN
Status: DISCONTINUED | OUTPATIENT
Start: 2025-01-09 | End: 2025-01-11 | Stop reason: HOSPADM

## 2025-01-09 RX ORDER — OXYCODONE AND ACETAMINOPHEN 7.5; 325 MG/1; MG/1
1 TABLET ORAL EVERY 4 HOURS PRN
Status: DISCONTINUED | OUTPATIENT
Start: 2025-01-09 | End: 2025-01-11 | Stop reason: HOSPADM

## 2025-01-09 RX ORDER — BUPIVACAINE HYDROCHLORIDE AND EPINEPHRINE 2.5; 5 MG/ML; UG/ML
INJECTION, SOLUTION EPIDURAL; INFILTRATION; INTRACAUDAL; PERINEURAL AS NEEDED
Status: DISCONTINUED | OUTPATIENT
Start: 2025-01-09 | End: 2025-01-09 | Stop reason: HOSPADM

## 2025-01-09 RX ORDER — FAMOTIDINE 20 MG/1
20 TABLET, FILM COATED ORAL
Status: COMPLETED | OUTPATIENT
Start: 2025-01-09 | End: 2025-01-09

## 2025-01-09 RX ORDER — ONDANSETRON 2 MG/ML
4 INJECTION INTRAMUSCULAR; INTRAVENOUS ONCE AS NEEDED
Status: DISCONTINUED | OUTPATIENT
Start: 2025-01-09 | End: 2025-01-09 | Stop reason: HOSPADM

## 2025-01-09 RX ORDER — GLYCOPYRROLATE 0.2 MG/ML
INJECTION INTRAMUSCULAR; INTRAVENOUS AS NEEDED
Status: DISCONTINUED | OUTPATIENT
Start: 2025-01-09 | End: 2025-01-09 | Stop reason: SURG

## 2025-01-09 RX ORDER — DEXAMETHASONE SODIUM PHOSPHATE 10 MG/ML
INJECTION, SOLUTION INTRAMUSCULAR; INTRAVENOUS AS NEEDED
Status: DISCONTINUED | OUTPATIENT
Start: 2025-01-09 | End: 2025-01-09 | Stop reason: SURG

## 2025-01-09 RX ORDER — SODIUM CHLORIDE 0.9 % (FLUSH) 0.9 %
10 SYRINGE (ML) INJECTION EVERY 12 HOURS SCHEDULED
Status: DISCONTINUED | OUTPATIENT
Start: 2025-01-09 | End: 2025-01-11 | Stop reason: HOSPADM

## 2025-01-09 RX ORDER — SODIUM CHLORIDE, SODIUM LACTATE, POTASSIUM CHLORIDE, CALCIUM CHLORIDE 600; 310; 30; 20 MG/100ML; MG/100ML; MG/100ML; MG/100ML
INJECTION, SOLUTION INTRAVENOUS CONTINUOUS PRN
Status: DISCONTINUED | OUTPATIENT
Start: 2025-01-09 | End: 2025-01-09 | Stop reason: SURG

## 2025-01-09 RX ORDER — DEXMEDETOMIDINE HYDROCHLORIDE 100 UG/ML
INJECTION, SOLUTION INTRAVENOUS AS NEEDED
Status: DISCONTINUED | OUTPATIENT
Start: 2025-01-09 | End: 2025-01-09 | Stop reason: SURG

## 2025-01-09 RX ORDER — PROMETHAZINE HYDROCHLORIDE 12.5 MG/1
12.5 SUPPOSITORY RECTAL EVERY 6 HOURS PRN
Status: DISCONTINUED | OUTPATIENT
Start: 2025-01-09 | End: 2025-01-11 | Stop reason: HOSPADM

## 2025-01-09 RX ORDER — PROPOFOL 10 MG/ML
VIAL (ML) INTRAVENOUS AS NEEDED
Status: DISCONTINUED | OUTPATIENT
Start: 2025-01-09 | End: 2025-01-09 | Stop reason: SURG

## 2025-01-09 RX ORDER — LIDOCAINE HYDROCHLORIDE 10 MG/ML
0.5 INJECTION, SOLUTION EPIDURAL; INFILTRATION; INTRACAUDAL; PERINEURAL ONCE AS NEEDED
Status: COMPLETED | OUTPATIENT
Start: 2025-01-09 | End: 2025-01-09

## 2025-01-09 RX ORDER — LIDOCAINE HYDROCHLORIDE 10 MG/ML
INJECTION, SOLUTION EPIDURAL; INFILTRATION; INTRACAUDAL; PERINEURAL AS NEEDED
Status: DISCONTINUED | OUTPATIENT
Start: 2025-01-09 | End: 2025-01-09 | Stop reason: SURG

## 2025-01-09 RX ORDER — FENTANYL CITRATE 50 UG/ML
INJECTION, SOLUTION INTRAMUSCULAR; INTRAVENOUS
Status: COMPLETED
Start: 2025-01-09 | End: 2025-01-09

## 2025-01-09 RX ORDER — FENTANYL CITRATE 50 UG/ML
50 INJECTION, SOLUTION INTRAMUSCULAR; INTRAVENOUS
Status: DISCONTINUED | OUTPATIENT
Start: 2025-01-09 | End: 2025-01-09 | Stop reason: HOSPADM

## 2025-01-09 RX ORDER — SODIUM CHLORIDE 9 MG/ML
INJECTION, SOLUTION INTRAVENOUS AS NEEDED
Status: DISCONTINUED | OUTPATIENT
Start: 2025-01-09 | End: 2025-01-09 | Stop reason: HOSPADM

## 2025-01-09 RX ORDER — PROMETHAZINE HYDROCHLORIDE 12.5 MG/1
12.5 TABLET ORAL EVERY 6 HOURS PRN
Status: DISCONTINUED | OUTPATIENT
Start: 2025-01-09 | End: 2025-01-11 | Stop reason: HOSPADM

## 2025-01-09 RX ORDER — ONDANSETRON 2 MG/ML
INJECTION INTRAMUSCULAR; INTRAVENOUS AS NEEDED
Status: DISCONTINUED | OUTPATIENT
Start: 2025-01-09 | End: 2025-01-09 | Stop reason: SURG

## 2025-01-09 RX ORDER — VANCOMYCIN HYDROCHLORIDE 1 G/20ML
INJECTION, POWDER, LYOPHILIZED, FOR SOLUTION INTRAVENOUS AS NEEDED
Status: DISCONTINUED | OUTPATIENT
Start: 2025-01-09 | End: 2025-01-09 | Stop reason: HOSPADM

## 2025-01-09 RX ORDER — BISACODYL 10 MG
10 SUPPOSITORY, RECTAL RECTAL DAILY PRN
Status: DISCONTINUED | OUTPATIENT
Start: 2025-01-09 | End: 2025-01-11 | Stop reason: HOSPADM

## 2025-01-09 RX ORDER — DIPHENHYDRAMINE HCL 25 MG
25 CAPSULE ORAL NIGHTLY PRN
Status: DISCONTINUED | OUTPATIENT
Start: 2025-01-09 | End: 2025-01-11 | Stop reason: HOSPADM

## 2025-01-09 RX ORDER — PANTOPRAZOLE SODIUM 40 MG/1
40 TABLET, DELAYED RELEASE ORAL
Status: DISCONTINUED | OUTPATIENT
Start: 2025-01-10 | End: 2025-01-11 | Stop reason: HOSPADM

## 2025-01-09 RX ORDER — IBUPROFEN 800 MG/1
800 TABLET, FILM COATED ORAL ONCE
Status: COMPLETED | OUTPATIENT
Start: 2025-01-09 | End: 2025-01-09

## 2025-01-09 RX ORDER — ONDANSETRON 2 MG/ML
4 INJECTION INTRAMUSCULAR; INTRAVENOUS EVERY 6 HOURS PRN
Status: DISCONTINUED | OUTPATIENT
Start: 2025-01-09 | End: 2025-01-11 | Stop reason: HOSPADM

## 2025-01-09 RX ORDER — ACETAMINOPHEN 500 MG
1000 TABLET ORAL ONCE
Status: COMPLETED | OUTPATIENT
Start: 2025-01-09 | End: 2025-01-09

## 2025-01-09 RX ADMIN — HYDROMORPHONE HYDROCHLORIDE 0.5 MG: 1 INJECTION, SOLUTION INTRAMUSCULAR; INTRAVENOUS; SUBCUTANEOUS at 13:41

## 2025-01-09 RX ADMIN — LIDOCAINE HYDROCHLORIDE 50 MG: 10 INJECTION, SOLUTION EPIDURAL; INFILTRATION; INTRACAUDAL; PERINEURAL at 09:57

## 2025-01-09 RX ADMIN — FENTANYL CITRATE 50 MCG: 50 INJECTION, SOLUTION INTRAMUSCULAR; INTRAVENOUS at 09:57

## 2025-01-09 RX ADMIN — ROCURONIUM BROMIDE 50 MG: 10 INJECTION INTRAVENOUS at 09:58

## 2025-01-09 RX ADMIN — OXYCODONE HYDROCHLORIDE AND ACETAMINOPHEN 1 TABLET: 7.5; 325 TABLET ORAL at 16:10

## 2025-01-09 RX ADMIN — ALBUMIN (HUMAN): 12.5 INJECTION, SOLUTION INTRAVENOUS at 12:05

## 2025-01-09 RX ADMIN — EPHEDRINE SULFATE 5 MG: 50 INJECTION INTRAVENOUS at 10:08

## 2025-01-09 RX ADMIN — Medication 1500 MG: at 20:47

## 2025-01-09 RX ADMIN — SODIUM CHLORIDE, SODIUM LACTATE, POTASSIUM CHLORIDE, CALCIUM CHLORIDE 9 ML/HR: 20; 30; 600; 310 INJECTION, SOLUTION INTRAVENOUS at 07:57

## 2025-01-09 RX ADMIN — LIDOCAINE HYDROCHLORIDE 0.5 ML: 10 INJECTION, SOLUTION EPIDURAL; INFILTRATION; INTRACAUDAL; PERINEURAL at 07:56

## 2025-01-09 RX ADMIN — FENTANYL CITRATE 50 MCG: 50 INJECTION, SOLUTION INTRAMUSCULAR; INTRAVENOUS at 10:10

## 2025-01-09 RX ADMIN — EPHEDRINE SULFATE 5 MG: 50 INJECTION INTRAVENOUS at 10:16

## 2025-01-09 RX ADMIN — FENTANYL CITRATE 50 MCG: 50 INJECTION, SOLUTION INTRAMUSCULAR; INTRAVENOUS at 13:23

## 2025-01-09 RX ADMIN — FAMOTIDINE 20 MG: 20 TABLET, FILM COATED ORAL at 07:57

## 2025-01-09 RX ADMIN — OXYCODONE HYDROCHLORIDE 10 MG: 10 TABLET, FILM COATED, EXTENDED RELEASE ORAL at 07:56

## 2025-01-09 RX ADMIN — PROPOFOL 250 MG: 10 INJECTION, EMULSION INTRAVENOUS at 09:57

## 2025-01-09 RX ADMIN — ROCURONIUM BROMIDE 20 MG: 10 INJECTION INTRAVENOUS at 12:04

## 2025-01-09 RX ADMIN — PROPOFOL 25 MCG/KG/MIN: 10 INJECTION, EMULSION INTRAVENOUS at 10:00

## 2025-01-09 RX ADMIN — GLYCOPYRROLATE 0.2 MG: 0.2 INJECTION, SOLUTION INTRAMUSCULAR; INTRAVENOUS at 10:00

## 2025-01-09 RX ADMIN — SENNOSIDES AND DOCUSATE SODIUM 2 TABLET: 50; 8.6 TABLET ORAL at 20:47

## 2025-01-09 RX ADMIN — FENTANYL CITRATE 50 MCG: 50 INJECTION, SOLUTION INTRAMUSCULAR; INTRAVENOUS at 13:55

## 2025-01-09 RX ADMIN — ROCURONIUM BROMIDE 20 MG: 10 INJECTION INTRAVENOUS at 11:21

## 2025-01-09 RX ADMIN — DEXMEDETOMIDINE HYDROCHLORIDE 8 MCG: 100 INJECTION, SOLUTION INTRAVENOUS at 10:20

## 2025-01-09 RX ADMIN — ACETAMINOPHEN 1000 MG: 500 TABLET ORAL at 07:56

## 2025-01-09 RX ADMIN — SODIUM CHLORIDE, POTASSIUM CHLORIDE, SODIUM LACTATE AND CALCIUM CHLORIDE: 600; 310; 30; 20 INJECTION, SOLUTION INTRAVENOUS at 09:52

## 2025-01-09 RX ADMIN — HYDROMORPHONE HYDROCHLORIDE 0.5 MG: 1 INJECTION, SOLUTION INTRAMUSCULAR; INTRAVENOUS; SUBCUTANEOUS at 14:19

## 2025-01-09 RX ADMIN — OXYCODONE HYDROCHLORIDE AND ACETAMINOPHEN 1 TABLET: 7.5; 325 TABLET ORAL at 20:47

## 2025-01-09 RX ADMIN — PHENYLEPHRINE HYDROCHLORIDE 1 MCG/KG/MIN: 10 INJECTION INTRAVENOUS at 10:08

## 2025-01-09 RX ADMIN — ONDANSETRON 4 MG: 2 INJECTION INTRAMUSCULAR; INTRAVENOUS at 12:34

## 2025-01-09 RX ADMIN — ROCURONIUM BROMIDE 30 MG: 10 INJECTION INTRAVENOUS at 10:25

## 2025-01-09 RX ADMIN — SUGAMMADEX 200 MG: 100 INJECTION, SOLUTION INTRAVENOUS at 12:40

## 2025-01-09 RX ADMIN — Medication 1500 MG: at 07:57

## 2025-01-09 RX ADMIN — IBUPROFEN 800 MG: 800 TABLET, FILM COATED ORAL at 07:57

## 2025-01-09 RX ADMIN — DEXAMETHASONE SODIUM PHOSPHATE 8 MG: 10 INJECTION INTRAMUSCULAR; INTRAVENOUS at 10:00

## 2025-01-09 NOTE — ANESTHESIA POSTPROCEDURE EVALUATION
Patient: Jaylan Moran    Procedure Summary       Date: 01/09/25 [facility]  [facility]    Anesthesia Start: 0952 Anesthesia Stop: 1307    Procedure: LUMBAR FUSION DECOMPRESSON WITH PEDICLE SCREWS L2-3 (Spine Lumbar) Diagnosis:       Degeneration of intervertebral disc of lumbar region with discogenic back pain and lower extremity pain      Spinal stenosis, lumbar region, with neurogenic claudication      (Degeneration of intervertebral disc of lumbar region with discogenic back pain and lower extremity pain [M51.362])      (Spinal stenosis, lumbar region, with neurogenic claudication [M48.062])    Surgeons: Morgan Escobar MD Provider: Pepe Valadez MD    Anesthesia Type: general ASA Status: 3            Anesthesia Type: general    Vitals  Vitals Value Taken Time   /68 01/09/25 1305   Temp     Pulse 80 01/09/25 1309   Resp     SpO2 97 % 01/09/25 1309   Vitals shown include unfiled device data.        Post Anesthesia Care and Evaluation    Patient location during evaluation: PACU  Patient participation: waiting for patient participation  Level of consciousness: responsive to physical stimuli  Pain management: adequate    Airway patency: patent  Anesthetic complications: No anesthetic complications  PONV Status: none  Cardiovascular status: hemodynamically stable and acceptable  Respiratory status: nonlabored ventilation, acceptable and nasal cannula  Hydration status: acceptable

## 2025-01-09 NOTE — H&P
Pre-Op H&P  Jaylan Moran  [id number]  1970      Chief complaint: Back pain      Subjective:  Patient is a 54 y.o.male presents for scheduled surgery by Dr. Escobar. He anticipates a LUMBAR FUSION DECOMPRESSON WITH PEDICLE SCREWS L2-3  today. He reports chronic back pain for 15-20 years. The pain radiates down BLE. No saddle anesthesia. He had previous neck and back surgeries. Symptoms worse with prolonged standing. He tried injections and ablations in the past.       Review of Systems:  Constitutional-- No fever, chills or sweats. No fatigue.  CV-- No chest pain, palpitation or syncope  Resp-- No SOB, cough, hemoptysis  Skin--No rashes or lesions      Allergies:   Allergies   Allergen Reactions    Penicillins Other (See Comments) and Unknown - Low Severity     SINCE INFANCY         Home Meds:  Medications Prior to Admission   Medication Sig Dispense Refill Last Dose/Taking    acetaminophen (TYLENOL) 650 MG 8 hr tablet Take 1 tablet by mouth Every 8 (Eight) Hours As Needed for Mild Pain. 30 tablet 0     Chlorhexidine Gluconate (Hibiclens) 4 % solution Apply 1 Application topically Daily. 5 days prior to procedure 120 mL 0     ibuprofen (ADVIL,MOTRIN) 600 MG tablet Take 1 tablet by mouth Every 6 (Six) Hours As Needed for Moderate Pain. 90 tablet 0     Multiple Vitamins-Minerals (MULTIVITAL PO) Take 1 tablet by mouth Daily.       mupirocin (BACTROBAN) 2 % ointment Apply 1 Application topically to the appropriate area as directed 2 (Two) Times a Day. 5 days prior to procedure       omeprazole (PriLOSEC) 40 MG capsule Take 1 capsule by mouth As Needed (acid reflux).            PMH:   Past Medical History:   Diagnosis Date    Arthritis     Arthritis of back 2010    Arthritis of neck 2016    Had surgery    Back problem     Carpal tunnel syndrome     RIGHT    Cervical disc disorder 2021    Had surgery    Claustrophobia     GERD (gastroesophageal reflux disease)     History of anemia     Low back pain     Low  back strain 2010    Lumbosacral disc disease 2010    Neck strain 2016    Sleep apnea     mouth guard    Thoracic disc disorder     Wears glasses      PSH:    Past Surgical History:   Procedure Laterality Date    ANTERIOR CERVICAL DISCECTOMY W/ FUSION N/A 05/18/2017    Procedure: CERVICAL DISCECTOMY ANTERIOR WITH FUSION C4-7;  Surgeon: Morgan Escobar MD;  Location:  [facility];  Service:     BACK SURGERY  02/08/2021    laminectomy    CARPAL TUNNEL RELEASE Right 09/01/2016    Procedure: RIGHT CARPAL TUNNEL RELEASE;  Surgeon: Moragn Escobar MD;  Location:  [facility];  Service:     COLONOSCOPY      CYSTOSCOPY TRANSURETHRAL RESECTION OF PROSTATE N/A 11/19/2021    Procedure: CYSTOSCOPY, THULIUM LASER ABLATION OF PROSTATE;  Surgeon: Abdelrahman Calero MD;  Location:  [facility];  Service: Urology;  Laterality: N/A;    ENDOSCOPY      KNEE ARTHROSCOPY Right 11/22/2024    partially torn meniscus    LUMBAR LAMINECTOMY DISCECTOMY DECOMPRESSION N/A 02/11/2021    Procedure: LUMBAR LAMINECTOMY DISCECTOMY DECOMPRESSION POSTERIOR L2-5;  Surgeon: Morgan Escobar MD;  Location:  [facility];  Service: Neurosurgery;  Laterality: N/A;    NASAL SEPTUM SURGERY      NECK SURGERY  5/2016    OTHER SURGICAL HISTORY  12/11/2023    L2-5 Intracept procedure- Dr. Hai Botello    SHOULDER SURGERY Right 12/10/2021    shoulder arthroscopy with RCR, biceps tenodesis - JRT       Immunization History:  Influenza: UTD  Pneumococcal: No  Tetanus: UTD    Social History:   Tobacco:   Social History     Tobacco Use   Smoking Status Never    Passive exposure: Past   Smokeless Tobacco Never      Alcohol:     Social History     Substance and Sexual Activity   Alcohol Use Yes    Alcohol/week: 1.0 - 2.0 standard drink of alcohol    Types: 1 - 2 Cans of beer per week    Comment: occasionally         Physical Exam: VS: /79  HR 84  RR 16  T 98.6  Sat 95%RA      General Appearance:    Alert, cooperative, no distress, appears stated age   Head:     Normocephalic, without obvious abnormality, atraumatic   Lungs:     Clear to auscultation bilaterally, respirations unlabored    Heart:   Regular rate and rhythm, S1 and S2 normal    Abdomen:    Soft without tenderness   Extremities:   Extremities normal, atraumatic, no cyanosis or edema   Skin:   Skin color, texture, turgor normal, no rashes or lesions   Neurologic:   Grossly intact     Results Review:     LABS:  Lab Results   Component Value Date    WBC 6.95 12/20/2024    HGB 13.6 12/20/2024    HCT 42.5 12/20/2024    MCV 87.4 12/20/2024     12/20/2024    NEUTROABS 4.52 12/20/2024    GLUCOSE 79 12/20/2024    BUN 14 12/20/2024    CREATININE 0.89 12/20/2024    EGFRIFNONA 83 11/17/2021     12/20/2024    K 4.6 12/20/2024     12/20/2024    CO2 26.0 12/20/2024    CALCIUM 9.1 12/20/2024    ALBUMIN 4.20 08/11/2021    AST 18 08/11/2021    ALT 12 08/11/2021    BILITOT 0.3 08/11/2021       RADIOLOGY:  Imaging Results (Last 72 Hours)       ** No results found for the last 72 hours. **            I reviewed the patient's new clinical results.    Cancer Staging (if applicable)  Cancer Patient: __ yes __no __unknown; If yes, clinical stage T:__ N:__M:__, stage group or __N/A      Impression: Recurrent lumbar stenosis with progressive instability at L2-3       Plan: LUMBAR FUSION DECOMPRESSON WITH PEDICLE SCREWS L2-3       Fadumo Kim, GIULIA   1/9/2025   07:48 EST

## 2025-01-09 NOTE — PLAN OF CARE
Goal Outcome Evaluation:  Plan of Care Reviewed With: patient, spouse        Progress: no change  Outcome Evaluation: PT initial evaluation complete. Pt ambulated OOR 300ft w/ CGAx2 and a step through gait pattern this date. No LOB or buckling noted throughout session however mobility limited by pain and fatigue this date. In addition pt educated on spinal precautions and log roll technique this date. IPPT services warranted to maximize functional independence. D/c rec is home w/ assist when medically ready for discharge    Anticipated Discharge Disposition (PT): home with assist

## 2025-01-09 NOTE — PLAN OF CARE
Patient arrived from PACU at 14:52.  Dressing with shadow drainage.  HV in place with 50ml out.  Ambulated twice with staff in the [facility].  Patient was able to void.  IV fluids continued per order.  Pain noted and treated with prn medication.  VSS.                                               Initial (On Arrival)

## 2025-01-09 NOTE — LETTER
[facility]    Patient: Jaylan Moran  : 1970      Dear Boris,    I just completed the redo decompression with fusion and stabilization at L2-3 on Mr. Moran.  Thus far all has gone well.  He will likely be hospitalized for couple of days.      With kindest regards,    Morgan Escobar MD  25  12:56 EST

## 2025-01-09 NOTE — ANESTHESIA PREPROCEDURE EVALUATION
Anesthesia Evaluation                  Airway   Mallampati: I  TM distance: >3 FB  Neck ROM: full  No difficulty expected  Dental      Pulmonary    (+) ,sleep apnea  Cardiovascular     ECG reviewed        Neuro/Psych  (+) numbness  GI/Hepatic/Renal/Endo    (+) obesity, GERD    Musculoskeletal     Abdominal    Substance History      OB/GYN          Other   arthritis,                 Anesthesia Plan    ASA 3     general     intravenous induction     Anesthetic plan, risks, benefits, and alternatives have been provided, discussed and informed consent has been obtained with: patient.    Plan discussed with CRNA.    CODE STATUS:

## 2025-01-09 NOTE — OP NOTE
NEUROSURGICAL OPERATIVE NOTE        PREOPERATIVE DIAGNOSIS:    L2-3 spondylolisthesis, stenosis, instability      POSTOPERATIVE DIAGNOSIS:  Same      PROCEDURE:  1.  Arthrodesis interbody type L2-3  2.  Redo L2-3 laminectomy with partial facetectomies and foraminotomies  3.  Bilateral L2-3 discectomy with bilateral Adaptix cage placement  4.  Nonsegmental pedicle screw fixation L2-3 utilizing Solera  5.  Use of Jina and local autograft  6.  Stealth frameless stereotaxy utilized in conjunction with O arm imaging      SURGEON:  Morgan Escobar M.D.      ASSISTANT: Ange Aburto PA-C    PAC assisted with:   Suctioning   Retraction   Tying   Suturing   Closing   Application of dressing   Skilled neurosurgery PA assistance was necessary to perform this procedure.        ANESTHESIA:  General      ESTIMATED BLOOD LOSS: 110 mL      SPECIMEN: None      DRAINS: Hemovac      COMPLICATIONS:  None      Spinal Surgery Levels Completed:1 Level        CLINICAL NOTE:  The patient is a 54-year-old gentleman who several years ago underwent lumbar decompression at multiple levels.  He has developed progressive back and lower extremity pain and has developed spondylolisthesis at the L2-3 site with recurrent stenosis.  As such, the patient presents at this time for redo decompression with fusion and stabilization.  The nature of the procedure as well as the potential risks, complications, limitations, and alternatives to the procedure were discussed at length with the patient and the patient has agreed to proceed with surgery.      TECHNICAL NOTE:  The patient was brought to the operating room and while on his cart general endotracheal anesthesia was achieved.  He was then turned prone onto the Darrion table.  Special care was ensured to protect pressure points.  His low back was prepared and draped in the usual fashion.  A localizing radiograph was obtained with the spinal needle in the lumbosacral midline utilizing the C-arm.   Based on this, a several centimeter vertical incision was fashioned.  Underlying tissues were divided with cautery to provide exposure to the posterior spinal elements at L2 and L3.  Reference frame was affixed to the residual L2 spinous process.  O-arm imaging ensued.  These images were downloaded into the [employer].  Using Stealth frameless stereotaxy, each of the pedicle screw hole sites at L2 and L3 bilaterally were marked, drilled and tapped.  All screws were 6.5 mm in diameter x 55 mm in length.  Leksell rongeur was then utilized to remove the residual spinous process at L2.  The previous laminectomy was widened substantially using drill and Kerrison punches.  Granulation tissue was taken down.  Good decompression of the L2 and L3 nerve roots as well as the thecal sac was accomplished.  The C-arm was brought into use and beginning on the right side, the disk was incised and evacuated piecemeal with an array of pituitary rongeurs and curettes.  Serial impactors were impacted into the very narrow disk space.  Ultimately, a 9 x 24 mm Adaptix cage was impacted into place using fluoroscopic guidance.  This had been packed with a fusion substrate consisting of [place] and local autograft.  Attention was turned to the contralateral side.  After preparing the disk space, some of the fusion substrate was placed anteriorly and in the midline.  A second 9 x 24 mm Adaptix cage was then impacted into place.  The dural sac and nerve roots were intact and well-decompressed.  Solera rods measuring 30 mm on the right and 35 mm on the left were affixed to the screw heads on each side.  Set screws were applied at L3 bilaterally and using the mechanical reducer the screws at L2 were brought up to the yane thereby reducing the listhesis.  Set screws were tightened and broken off per routine. The wound was washed out with a saline solution.  Gelfoam was left in the gutters.  A Hemovac drain was brought in through a separate  stab incision and left in the epidural space.  Vancomycin powder was sprinkled in the depths and then more superficially as the wound was closed.  The paraspinous muscle and fascia were reapproximated in interrupted fashion with 0 Vicryl suture, and 0.25% Marcaine was instilled in the paraspinous musculature and subcutaneous tissues.  Subcutaneous tissues were closed in layers with 2-0 followed by 3-0 Vicryl suture.  The skin was closed in a running subcuticular fashion with 3-0 Vicryl suture. Steri-Strips and a sterile dressing were applied.  He was rolled onto his cart, extubated and taken to the recovery room in satisfactory condition.  He did receive preoperative antibiotics.              Morgan Escobar M.D.

## 2025-01-09 NOTE — THERAPY EVALUATION
Patient Name: Jaylan Moran  : 1970    MRN: [id number]                              Today's Date: 2025       Admit Date: 2025    Visit Dx: No diagnosis found.  Patient Active Problem List   Diagnosis    Carpal tunnel syndrome of right wrist    Cervical spondylosis with radiculopathy    Status post cervical spinal fusion    Spinal stenosis, lumbar region, with neurogenic claudication    Sacroiliac joint dysfunction of right side    S/P lumbar laminectomy    Degenerative disc disease, lumbar    Benign prostatic hyperplasia with urinary obstruction    Lumbar stenosis with neurogenic claudication     Past Medical History:   Diagnosis Date    Arthritis     Arthritis of back     Arthritis of neck     Had surgery    Back problem     Carpal tunnel syndrome     RIGHT    Cervical disc disorder     Had surgery    Claustrophobia     GERD (gastroesophageal reflux disease)     History of anemia     Low back pain     Low back strain     Lumbosacral disc disease     Neck strain     Sleep apnea     mouth guard    Thoracic disc disorder     Wears glasses      Past Surgical History:   Procedure Laterality Date    ANTERIOR CERVICAL DISCECTOMY W/ FUSION N/A 2017    Procedure: CERVICAL DISCECTOMY ANTERIOR WITH FUSION C4-7;  Surgeon: Morgan Escobar MD;  Location:  [facility];  Service:     BACK SURGERY  2021    laminectomy    CARPAL TUNNEL RELEASE Right 2016    Procedure: RIGHT CARPAL TUNNEL RELEASE;  Surgeon: Morgan Escobar MD;  Location:  [facility];  Service:     COLONOSCOPY      CYSTOSCOPY TRANSURETHRAL RESECTION OF PROSTATE N/A 2021    Procedure: CYSTOSCOPY, THULIUM LASER ABLATION OF PROSTATE;  Surgeon: Abdelrahman Calero MD;  Location:  [facility];  Service: Urology;  Laterality: N/A;    ENDOSCOPY      KNEE ARTHROSCOPY Right 2024    partially torn meniscus    LUMBAR LAMINECTOMY DISCECTOMY DECOMPRESSION N/A 2021    Procedure: LUMBAR LAMINECTOMY  DISCECTOMY DECOMPRESSION POSTERIOR L2-5;  Surgeon: Morgan Escobar MD;  Location: [facility];  Service: Neurosurgery;  Laterality: N/A;    NASAL SEPTUM SURGERY      NECK SURGERY  5/2016    OTHER SURGICAL HISTORY  12/11/2023    L2-5 Intracept procedure- Dr. Hai Botello    SHOULDER SURGERY Right 12/10/2021    shoulder arthroscopy with RCR, biceps tenodesis - JRT      General Information       Row Name 01/09/25 1646          Physical Therapy Time and Intention    Document Type evaluation  -AC     Mode of Treatment physical therapy  -AC       Row Name 01/09/25 1646          General Information    Patient Profile Reviewed yes  -AC     Prior Level of Function independent:;all household mobility;community mobility;gait;transfer;bed mobility;ADL's  Does not own any DME  -AC     Existing Precautions/Restrictions fall;spinal  -AC     Barriers to Rehab none identified  -AC       Row Name 01/09/25 1646          Living Environment    People in Home spouse  -AC       Row Name 01/09/25 1646          Home Main Entrance    Number of Stairs, Main Entrance five  -AC     Stair Railings, Main Entrance railings safe and in good condition  -AC       Row Name 01/09/25 1646          Stairs Within Home, Primary    Number of Stairs, Within Home, Primary none  -AC       Row Name 01/09/25 1646          Cognition    Orientation Status (Cognition) oriented x 4  -AC       Row Name 01/09/25 1646          Safety Issues/Impairments Affecting Functional Mobility    Impairments Affecting Function (Mobility) endurance/activity tolerance;pain;strength;range of motion (ROM)  -               User Key  (r) = Recorded By, (t) = Taken By, (c) = Cosigned By      Initials Name Provider Type    AC Evelyn Ruiz PT Physical Therapist                   Mobility       Row Name 01/09/25 1647          Bed Mobility    Bed Mobility rolling left;sidelying-sit  -AC     Rolling Left [place] (Bed Mobility) minimum assist (75% patient effort);1 person assist  -AC   "   Sidelying-Sit [place] (Bed Mobility) minimum assist (75% patient effort);2 person assist  -AC     Assistive Device (Bed Mobility) bed rails;head of bed elevated  -AC     Comment, (Bed Mobility) Pt educated on log roll and required minAx1 in order to initiate roll and minAx2 in order to bring trunk to upright sitting. No dizziness noted in sitting  -       Row Name 01/09/25 1647          Transfers    Comment, (Transfers) VC for hand placement and sequencing  -       Row Name 01/09/25 1647          Sit-Stand Transfer    Sit-Stand [place] (Transfers) 2 person assist;contact guard  -AC     Comment, (Sit-Stand Transfer) Pt transitioned to standing w/ CGAx2 and HHA. No LOB, buckling, or dizziness noted.  -       Row Name 01/09/25 1647          Gait/Stairs (Locomotion)    [place] Level (Gait) contact guard;2 person assist  -AC     Assistive Device (Gait) other (see comments)  IV used for UE support  -     Patient was able to Ambulate yes  -AC     Distance in Feet (Gait) 300  -AC     Deviations/Abnormal Patterns (Gait) juan m decreased;gait speed decreased;stride length decreased  -AC     Bilateral Gait Deviations heel strike decreased  -AC     Comment, (Gait/Stairs) Pt ambulated OOR w/ CGAX2 and IV for UE support. Pt demonstrated decreased gait speed and decreased foot clearance bilaterally. No LOB or buckling noted throughout however gait distance limited by fatigue and pain. Pt reported feeling \"flush\" when returning to room however sx subsided after sitting down and providing pt w/ cold washcloth. BP remained stable.  -               User Key  (r) = Recorded By, (t) = Taken By, (c) = Cosigned By      Initials Name Provider Type    AC Evelyn Ruiz PT Physical Therapist                   Obj/Interventions       Row Name 01/09/25 1651          Range of Motion Comprehensive    General Range of Motion bilateral lower extremity ROM WNL  -       Row Name 01/09/25 1651          Strength " Comprehensive (MMT)    General Manual Muscle Testing (MMT) Assessment lower extremity strength deficits identified  -     Comment, General Manual Muscle Testing (MMT) Assessment RLE functionality 4/5, LLE functionality 4/5. No LOB or buckling in LE's noted w/ ambulation  -       Row Name 01/09/25 1651          Motor Skills    Therapeutic Exercise hip;knee;ankle  -       Row Name 01/09/25 1651          Hip (Therapeutic Exercise)    Hip (Therapeutic Exercise) isometric exercises  -     Hip Isometrics (Therapeutic Exercise) bilateral;gluteal sets;10 repetitions  -       Row Name 01/09/25 1651          Knee (Therapeutic Exercise)    Knee (Therapeutic Exercise) isometric exercises  -     Knee Isometrics (Therapeutic Exercise) bilateral;quad sets;10 repetitions  -       Row Name 01/09/25 1651          Ankle (Therapeutic Exercise)    Ankle (Therapeutic Exercise) AROM (active range of motion)  -     Ankle AROM (Therapeutic Exercise) bilateral;dorsiflexion;plantarflexion;10 repetitions  -       Row Name 01/09/25 1651          Balance    Balance Assessment sitting static balance;sitting dynamic balance;standing static balance;standing dynamic balance  -     Static Sitting Balance standby assist  -     Dynamic Sitting Balance standby assist  -     Position, Sitting Balance unsupported;sitting edge of bed  -     Static Standing Balance contact guard  -     Dynamic Standing Balance contact guard  -     Position/Device Used, Standing Balance unsupported  -     Balance Interventions sitting;standing;sit to stand;supported;static;dynamic  -       Row Name 01/09/25 1651          Sensory Assessment (Somatosensory)    Sensory Assessment (Somatosensory) LE sensation intact  -               User Key  (r) = Recorded By, (t) = Taken By, (c) = Cosigned By      Initials Name Provider Type     Evelyn Ruiz PT Physical Therapist                   Goals/Plan       Row Name 01/09/25 1656          Bed  Mobility Goal 1 (PT)    Activity/Assistive Device (Bed Mobility Goal 1, PT) sit to supine/supine to sit  -AC     [place] Level/Cues Needed (Bed Mobility Goal 1, PT) modified independence  -AC     Time Frame (Bed Mobility Goal 1, PT) long term goal (LTG);3 days  -AC       Row Name 01/09/25 1656          Transfer Goal 1 (PT)    Activity/Assistive Device (Transfer Goal 1, PT) sit-to-stand/stand-to-sit  -AC     [place] Level/Cues Needed (Transfer Goal 1, PT) modified independence  -AC     Time Frame (Transfer Goal 1, PT) long term goal (LTG);3 days  -AC       Row Name 01/09/25 1656          Gait Training Goal 1 (PT)    Activity/Assistive Device (Gait Training Goal 1, PT) gait (walking locomotion)  -AC     [place] Level (Gait Training Goal 1, PT) modified independence  -AC     Distance (Gait Training Goal 1, PT) 200  -AC     Time Frame (Gait Training Goal 1, PT) long term goal (LTG);3 days  -AC       Row Name 01/09/25 1656          Therapy Assessment/Plan (PT)    Planned Therapy Interventions (PT) balance training;bed mobility training;gait training;home exercise program;ROM (range of motion);patient/family education;stair training;strengthening;stretching;transfer training  -AC               User Key  (r) = Recorded By, (t) = Taken By, (c) = Cosigned By      Initials Name Provider Type    AC Evelyn Ruiz, PT Physical Therapist                   Clinical Impression       Row Name 01/09/25 1652          Pain    Pretreatment Pain Rating 3/10  -AC     Posttreatment Pain Rating 4/10  -AC     Pain Location back  -AC     Pain Side/Orientation lower  -AC     Pain Management Interventions activity modification encouraged;exercise or physical activity utilized;positioning techniques utilized;cold applied  -AC     Response to Pain Interventions activity participation with increased pain  -AC       Row Name 01/09/25 1652          Plan of Care Review    Plan of Care Reviewed With patient;spouse  -AC     Progress no  change  -AC     Outcome Evaluation PT initial evaluation complete. Pt ambulated OOR 300ft w/ CGAx2 and a step through gait pattern this date. No LOB or buckling noted throughout session however mobility limited by pain and fatigue this date. In addition pt educated on spinal precautions and log roll technique this date. IPPT services warranted to maximize functional independence. D/c rec is home w/ assist when medically ready for discharge  -AC       Row Name 01/09/25 1652          Therapy Assessment/Plan (PT)    Rehab Potential (PT) good  -AC     Criteria for Skilled Interventions Met (PT) yes  -AC     Therapy Frequency (PT) daily  -AC     Predicted Duration of Therapy Intervention (PT) 2  -AC       Row Name 01/09/25 1652          Vital Signs    Pre Systolic BP Rehab 123  -AC     Pre Treatment Diastolic BP 82  -AC     Post Systolic BP Rehab 130  -AC     Post Treatment Diastolic BP 85  -AC     Pre SpO2 (%) 97  -AC     O2 Delivery Pre Treatment nasal cannula  -AC     O2 Delivery Intra Treatment room air  -AC     Post SpO2 (%) 94  -AC     O2 Delivery Post Treatment room air  -AC     Pre Patient Position Supine  -AC     Intra Patient Position Standing  -AC     Post Patient Position Sitting  -AC       Row Name 01/09/25 1652          Positioning and Restraints    Pre-Treatment Position in bed  -AC     Post Treatment Position chair  -AC     In Chair notified nsg;reclined;sitting;call light within reach;encouraged to call for assist;exit alarm on;waffle cushion;legs elevated  ice applied  -AC               User Key  (r) = Recorded By, (t) = Taken By, (c) = Cosigned By      Initials Name Provider Type    AC Evelyn Ruiz, PT Physical Therapist                   Outcome Measures       Row Name 01/09/25 1656 01/09/25 1452       How much help from another person do you currently need...    Turning from your back to your side while in flat bed without using bedrails? 3  -AC 3  -SC    Moving from lying on back to sitting on the  side of a flat bed without bedrails? 3  -AC 3  -SC    Moving to and from a bed to a chair (including a wheelchair)? 3  -AC 3  -SC    Standing up from a chair using your arms (e.g., wheelchair, bedside chair)? 3  -AC 3  -SC    Climbing 3-5 steps with a railing? 3  -AC 3  -SC    To walk in hospital room? 3  -AC 3  -SC    AM-PAC 6 Clicks Score (PT) 18  -AC 18  -SC    Highest Level of Mobility Goal 6 --> Walk 10 steps or more  -AC 6 --> Walk 10 steps or more  -SC      Row Name 01/09/25 1656          Functional Assessment    Outcome Measure Options AM-PAC 6 Clicks Basic Mobility (PT)  -               User Key  (r) = Recorded By, (t) = Taken By, (c) = Cosigned By      Initials Name Provider Type    SC Sena Daugherty RN Registered Nurse    AC Evelyn Ruiz, PT Physical Therapist                                 Physical Therapy Education       Title: PT OT SLP Therapies (Done)       Topic: Physical Therapy (Done)       Point: Mobility training (Done)       Learning Progress Summary            Patient Acceptance, E, VU by  at 1/9/2025 1656                      Point: Home exercise program (Done)       Learning Progress Summary            Patient Acceptance, E, VU by  at 1/9/2025 1656                      Point: Body mechanics (Done)       Learning Progress Summary            Patient Acceptance, E, VU by  at 1/9/2025 1656                      Point: Precautions (Done)       Learning Progress Summary            Patient Acceptance, E, VU by  at 1/9/2025 1656                                      User Key       Initials Effective Dates Name Provider Type Discipline     07/11/24 -  Evelyn Ruiz, PT Physical Therapist PT                  PT Recommendation and Plan  Planned Therapy Interventions (PT): balance training, bed mobility training, gait training, home exercise program, ROM (range of motion), patient/family education, stair training, strengthening, stretching, transfer training  Progress: no change  Outcome  Evaluation: PT initial evaluation complete. Pt ambulated OOR 300ft w/ CGAx2 and a step through gait pattern this date. No LOB or buckling noted throughout session however mobility limited by pain and fatigue this date. In addition pt educated on spinal precautions and log roll technique this date. IPPT services warranted to maximize functional independence. D/c rec is home w/ assist when medically ready for discharge     Time Calculation:   PT Evaluation Complexity  History, PT Evaluation Complexity: 1-2 personal factors and/or comorbidities  Examination of Body Systems (PT Eval Complexity): 1-2 elements  Clinical Presentation (PT Evaluation Complexity): stable  Clinical Decision Making (PT Evaluation Complexity): low complexity  Overall Complexity (PT Evaluation Complexity): low complexity     PT Charges       Row Name 01/09/25 1657             Time Calculation    Start Time 1608  -AC      PT Received On 01/09/25  -AC      PT Goal Re-Cert Due Date 01/19/25  -AC         Untimed Charges    PT Eval/Re-eval Minutes 48  -AC         Total Minutes    Untimed Charges Total Minutes 48  -AC       Total Minutes 48  -AC                User Key  (r) = Recorded By, (t) = Taken By, (c) = Cosigned By      Initials Name Provider Type    AC Evelyn Ruiz, PT Physical Therapist                  Therapy Charges for Today       Code Description Service Date Service Provider Modifiers Qty    99117721803 HC PT EVAL LOW COMPLEXITY 4 1/9/2025 Evelyn Ruiz, PT GP 1    99813254563 HC PT THER SUPP EA 15 MIN 1/9/2025 Evelyn Ruiz, PT GP 3            PT G-Codes  Outcome Measure Options: AM-PAC 6 Clicks Basic Mobility (PT)  AM-PAC 6 Clicks Score (PT): 18  PT Discharge Summary  Anticipated Discharge Disposition (PT): home with assist    Evelyn Ruiz PT  1/9/2025

## 2025-01-09 NOTE — ANESTHESIA PROCEDURE NOTES
Airway  Urgency: elective    Date/Time: 1/9/2025 10:16 AM  Airway not difficult    General Information and Staff    Patient location during procedure: OR    Indications and Patient Condition  Indications for airway management: airway protection    Preoxygenated: yes  MILS not maintained throughout  Mask difficulty assessment: 1 - vent by mask    Final Airway Details  Final airway type: endotracheal airway      Successful airway: ETT  Cuffed: yes   Successful intubation technique: direct laryngoscopy and video laryngoscopy  Facilitating devices/methods: intubating stylet  Endotracheal tube insertion site: oral  Blade: Oliveira  Blade size: 4  ETT size (mm): 7.5  Cormack-Lehane Classification: grade I - full view of glottis  Placement verified by: chest auscultation and capnometry   Measured from: lips  ETT/EBT  to lips (cm): 23  Number of attempts at approach: 1  Assessment: lips, teeth, and gum same as pre-op and atraumatic intubation    Additional Comments  Negative epigastric sounds, Breath sound equal bilaterally with symmetric chest rise and fall         Last visit: VV 6/22/21  Last refill: Montelukast 9/16/21 and Bupropion 6/22/21  Last labs: 12/26/2017    Refill set up below for Singulair    Refilled Bupropion per standing orders.

## 2025-01-10 LAB
HCT VFR BLD AUTO: 38.4 % (ref 37.5–51)
HGB BLD-MCNC: 12.3 G/DL (ref 13–17.7)

## 2025-01-10 PROCEDURE — 85014 HEMATOCRIT: CPT | Performed by: NEUROLOGICAL SURGERY

## 2025-01-10 PROCEDURE — 85018 HEMOGLOBIN: CPT | Performed by: NEUROLOGICAL SURGERY

## 2025-01-10 PROCEDURE — 99024 POSTOP FOLLOW-UP VISIT: CPT | Performed by: NEUROLOGICAL SURGERY

## 2025-01-10 PROCEDURE — 97110 THERAPEUTIC EXERCISES: CPT

## 2025-01-10 PROCEDURE — 97165 OT EVAL LOW COMPLEX 30 MIN: CPT

## 2025-01-10 PROCEDURE — 97116 GAIT TRAINING THERAPY: CPT

## 2025-01-10 RX ADMIN — Medication 10 ML: at 21:12

## 2025-01-10 RX ADMIN — OXYCODONE HYDROCHLORIDE AND ACETAMINOPHEN 1 TABLET: 7.5; 325 TABLET ORAL at 21:11

## 2025-01-10 RX ADMIN — OXYCODONE HYDROCHLORIDE AND ACETAMINOPHEN 1 TABLET: 7.5; 325 TABLET ORAL at 13:44

## 2025-01-10 RX ADMIN — SENNOSIDES AND DOCUSATE SODIUM 2 TABLET: 50; 8.6 TABLET ORAL at 21:11

## 2025-01-10 RX ADMIN — OXYCODONE HYDROCHLORIDE AND ACETAMINOPHEN 1 TABLET: 7.5; 325 TABLET ORAL at 08:10

## 2025-01-10 RX ADMIN — PANTOPRAZOLE SODIUM 40 MG: 40 TABLET, DELAYED RELEASE ORAL at 05:33

## 2025-01-10 RX ADMIN — SENNOSIDES AND DOCUSATE SODIUM 2 TABLET: 50; 8.6 TABLET ORAL at 08:10

## 2025-01-10 NOTE — PROGRESS NOTES
NEUROSURGERY PROGRESS NOTE     LOS: 0 days   Patient Care Team:  Symone Sanchez MD as PCP - General (Internal Medicine)  Symone Sanchez MD as Referring Physician (Internal Medicine)  Najma Pitt PA-C as Physician Assistant (Physician Assistant)    Chief Complaint: Low back and leg pain with walking and standing intolerance.    POD#: 1 Day Post-Op  Procedures:  L2-3 PLIF.    Interval History:   The patient has already ambulated in the [place] and is voiding independently.  Patient Complaints: Incisional pain.  Patient Denies: Voiding difficulty or preoperative claudication symptoms.    Vital Signs: Blood pressure 115/60, pulse 88, temperature 97.7 °F (36.5 °C), temperature source Oral, resp. rate 18, SpO2 92%.  Intake/Output:   Intake/Output Summary (Last 24 hours) at 1/10/2025 0557  Last data filed at 1/10/2025 0325  Gross per 24 hour   Intake 850 ml   Output 2295 ml   Net -1445 ml     Drain output: 100/75 mL.    Physical Exam:  The patient is awake and alert.  He moves about independently in bed.  Motor function and tone are normal in his lower extremities  Dry dressing is in place on his incision.     Data Review:    H&H pending.    Assessment/Plan:  1.  L2-3 spondylolisthesis, stenosis, instability status post redo decompression with fusion and stabilization.  2.  Sleep apnea.  3.  Disposition: Mobilize patient.  Hopefully home in the next day or 2 once drain is out.      Morgan Escobar MD  01/10/25  05:57 EST     Prazosin refill order requested but pt to see Dr. Lenz today at 3:20pm.   No refill to be sent as will be ordered during visit.     ENEDINA Garcia

## 2025-01-10 NOTE — THERAPY EVALUATION
BG goal 140 - 180    Low Dose SQ Insulin Correction Scale PRN for BG excursions.  BG monitoring q 4 hrs.     ** Please notify Endocrine for any change and/or advance in diet/TF**  ** Please call Endocrine for any BG related issues **    Discharge Recommendations:     TBD.            Patient Name: Jaylan Moran  : 1970    MRN: [id number]                              Today's Date: 1/10/2025       Admit Date: 2025    Visit Dx: No diagnosis found.  Patient Active Problem List   Diagnosis    Carpal tunnel syndrome of right wrist    Cervical spondylosis with radiculopathy    Status post cervical spinal fusion    Spinal stenosis, lumbar region, with neurogenic claudication    Sacroiliac joint dysfunction of right side    S/P lumbar laminectomy    Degenerative disc disease, lumbar    Benign prostatic hyperplasia with urinary obstruction    Lumbar stenosis with neurogenic claudication     Past Medical History:   Diagnosis Date    Arthritis     Arthritis of back     Arthritis of neck     Had surgery    Back problem     Carpal tunnel syndrome     RIGHT    Cervical disc disorder     Had surgery    Claustrophobia     GERD (gastroesophageal reflux disease)     History of anemia     Low back pain     Low back strain     Lumbosacral disc disease     Neck strain     Sleep apnea     mouth guard    Thoracic disc disorder     Wears glasses      Past Surgical History:   Procedure Laterality Date    ANTERIOR CERVICAL DISCECTOMY W/ FUSION N/A 2017    Procedure: CERVICAL DISCECTOMY ANTERIOR WITH FUSION C4-7;  Surgeon: Morgan Escobar MD;  Location:  [facility];  Service:     BACK SURGERY  2021    laminectomy    CARPAL TUNNEL RELEASE Right 2016    Procedure: RIGHT CARPAL TUNNEL RELEASE;  Surgeon: Morgan Escobar MD;  Location:  [facility];  Service:     COLONOSCOPY      CYSTOSCOPY TRANSURETHRAL RESECTION OF PROSTATE N/A 2021    Procedure: CYSTOSCOPY, THULIUM LASER ABLATION OF PROSTATE;  Surgeon: Abdelrahman Calero MD;  Location:  [facility];  Service: Urology;  Laterality: N/A;    ENDOSCOPY      KNEE ARTHROSCOPY Right 2024    partially torn meniscus    LUMBAR LAMINECTOMY DISCECTOMY DECOMPRESSION N/A 2021    Procedure: LUMBAR LAMINECTOMY  DISCECTOMY DECOMPRESSION POSTERIOR L2-5;  Surgeon: Morgan Escobar MD;  [facility];  Service: Neurosurgery;  Laterality: N/A;    NASAL SEPTUM SURGERY      NECK SURGERY  5/2016    OTHER SURGICAL HISTORY  12/11/2023    L2-5 Intracept procedure- Dr. Hai Botello    SHOULDER SURGERY Right 12/10/2021    shoulder arthroscopy with RCR, biceps tenodesis - JRT      General Information       Row Name 01/10/25 0959          OT Time and Intention    Document Type evaluation  -MR     Mode of Treatment occupational therapy  -MR       Row Name 01/10/25 0959          General Information    Patient Profile Reviewed yes  -MR     Prior Level of Function independent:;all household mobility;community mobility;gait;transfer;bed mobility;ADL's  Does not own any DME; Denies any recent falls. Has a step over shower.  -MR     Existing Precautions/Restrictions fall;spinal;other (see comments)  Hemovac  -MR     Barriers to Rehab none identified  -MR       Row Name 01/10/25 0959          Living Environment    People in Home spouse  -MR       Row Name 01/10/25 0959          Home Main Entrance    Number of Stairs, Main Entrance five  -MR     Stair Railings, Main Entrance railing on right side (ascending)  -MR       Row Name 01/10/25 0959          Stairs Within Home, Primary    Number of Stairs, Within Home, Primary none  -MR       Row Name 01/10/25 0959          Cognition    Orientation Status (Cognition) oriented x 4  -MR       Row Name 01/10/25 0959          Safety Issues/Impairments Affecting Functional Mobility    Safety Issues Affecting Function (Mobility) insight into deficits/self-awareness  -MR     Impairments Affecting Function (Mobility) endurance/activity tolerance;pain;strength;range of motion (ROM)  -MR               User Key  (r) = Recorded By, (t) = Taken By, (c) = Cosigned By      Initials Name Provider Type    MR Kenya Lockwood, OT Occupational Therapist                     Mobility/ADL's       Row Name 01/10/25 1002           Bed Mobility    Comment, (Bed Mobility) Pt received UIC and left UIC at end of session. Pt re-educated on spinal precautions and log rolling for ingressing/egressing bed. Pt stating that he plans to sleep in recliner.  -MR [place] Name 01/10/25 1002          Transfers    Transfers sit-stand transfer  -MR [place] Name 01/10/25 1002          Sit-Stand Transfer    Sit-Stand [place] (Transfers) contact guard;verbal cues  -MR [place] Name 01/10/25 1002          Functional Mobility    Functional Mobility- Ind. Level contact guard assist;verbal cues required  -MR     Functional Mobility- Device walker, front-wheeled  -MR     Functional Mobility-Distance (Feet) --  HH distances w/in pt's room  -MR [place] Name 01/10/25 1002          Activities of Daily Living    BADL Assessment/Intervention bathing;lower body dressing;toileting  -MR [place] Name 01/10/25 1002          Bathing Assessment/Intervention    [place] Level (Bathing) bathing skills  -MR     Comment, (Bathing) OT issued and educated pt on use of long handled sponge for LB bathing while adhering to spinal precautions.  -MR [place] Name 01/10/25 1002          Lower Body Dressing Assessment/Training    [place] Level (Lower Body Dressing) lower body dressing skills  -MR     Assistive Devices (Lower Body Dressing) reacher;long-handled shoe horn  -MR     Comment, (Lower Body Dressing) OT issued and educated pt on use of AE for increased I and safety w/ ADLs while limited by spinal precautions.  -MR [place] Name 01/10/25 1002          Toileting Assessment/Training    [place] Level (Toileting) toileting skills  -MR     Assistive Devices (Toileting) toilet paper aid  -MR     Comment, (Toileting) OT issued and educated on use of toilet paper aide for toileting to adhere to spinal precautions.  -MR               User Key  (r) = Recorded By, (t) = Taken By, (c) = Cosigned By      Initials Name Provider Type    Kenya Sparks, OT  Occupational Therapist                   Obj/Interventions       Row Name 01/10/25 1010          Sensory Assessment (Somatosensory)    Sensory Assessment (Somatosensory) UE sensation intact  -MR     Sensory Subjective Reports numbness;tingling  -MR     Sensory Assessment Pt reporting some R hand numbness and tingling from previous cervical surgery  -MR       Row Name 01/10/25 1010          Vision Assessment/Intervention    Visual Impairment/Limitations WFL;corrective lenses full-time  -MR       Row Name 01/10/25 1010          Range of Motion Comprehensive    General Range of Motion bilateral upper extremity ROM WFL  -MR       Row Name 01/10/25 1010          Strength Comprehensive (MMT)    Comment, General Manual Muscle Testing (MMT) Assessment BUE grossly 4/5 in all functional planes  -MR       Row Name 01/10/25 1010          Balance    Balance Assessment sitting static balance;sitting dynamic balance;standing static balance;standing dynamic balance  -MR     Static Sitting Balance standby assist  -MR     Dynamic Sitting Balance standby assist  -MR     Position, Sitting Balance unsupported;sitting in chair  -MR     Static Standing Balance contact guard  -MR     Dynamic Standing Balance contact guard  -MR     Position/Device Used, Standing Balance unsupported  -MR     Balance Interventions sitting;standing;sit to stand;supported;static;dynamic;occupation based/functional task  -MR               User Key  (r) = Recorded By, (t) = Taken By, (c) = Cosigned By      Initials Name Provider Type    MR Kenya Lockwood OT Occupational Therapist                   Goals/Plan       Row Name 01/10/25 1014          Bed Mobility Goal 1 (OT)    Activity/Assistive Device (Bed Mobility Goal 1, OT) rolling to left;rolling to right;sidelying to sit;sit to sidelying  -     [place] Level/Cues Needed (Bed Mobility Goal 1, OT) supervision required  -MR     Time Frame (Bed Mobility Goal 1, OT) short term goal (STG);3 days  -MR      Progress/Outcomes (Bed Mobility Goal 1, OT) new goal  -MR       Row Name 01/10/25 1014          Transfer Goal 1 (OT)    Activity/Assistive Device (Transfer Goal 1, OT) sit-to-stand/stand-to-sit;toilet  -MR     [place] Level/Cues Needed (Transfer Goal 1, OT) supervision required  -MR     Time Frame (Transfer Goal 1, OT) long term goal (LTG);5 days  -MR     Progress/Outcome (Transfer Goal 1, OT) new goal  -MR       Row Name 01/10/25 1014          Dressing Goal 1 (OT)    Activity/Device (Dressing Goal 1, OT) lower body dressing;reacher;sock-aid  -MR     [place]/Cues Needed (Dressing Goal 1, OT) contact guard required  -MR     Time Frame (Dressing Goal 1, OT) long term goal (LTG);5 days  -MR     Progress/Outcome (Dressing Goal 1, OT) new goal  -MR       Row Name 01/10/25 1014          Therapy Assessment/Plan (OT)    Planned Therapy Interventions (OT) activity tolerance training;adaptive equipment training;BADL retraining;functional balance retraining;transfer/mobility retraining;strengthening exercise;ROM/therapeutic exercise;patient/caregiver education/training;passive ROM/stretching;IADL retraining;occupation/activity based interventions  -MR               User Key  (r) = Recorded By, (t) = Taken By, (c) = Cosigned By      Initials Name Provider Type     Fabián Kenya, OT Occupational Therapist                   Clinical Impression       Row Name 01/10/25 1010          Pain Assessment    Pretreatment Pain Rating 6/10  -MR     Posttreatment Pain Rating 6/10  -MR     Pain Location back  -MR     Pain Side/Orientation lower;other (see comments)  incisional  -MR       Row Name 01/10/25 1010          Plan of Care Review    Plan of Care Reviewed With patient;spouse  -MR     Progress no change  -MR     Outcome Evaluation Patient presenting below his functional baseline w/ mobility, balance and transfers d/t back pain limiting independence w/ ADLs. Pt requiring assist for LB dressing, transfers and HH distances of  functional mobility. OT issued and educated pt on use of AE for maximum independence w/ ADLs. Recommend home w/ assist at d/c for best functional outcome.  -MR       Row Name 01/10/25 1010          Therapy Assessment/Plan (OT)    Patient/Family Therapy Goal Statement (OT) Return to PLOF  -MR     Rehab Potential (OT) good  -MR     Criteria for Skilled Therapeutic Interventions Met (OT) yes;meets criteria;skilled treatment is necessary  -MR     Therapy Frequency (OT) daily  -MR     Predicted Duration of Therapy Intervention (OT) 5 days  -MR       Row Name 01/10/25 1010          Therapy Plan Review/Discharge Plan (OT)    Anticipated Discharge Disposition (OT) home with assist  -MR       Row Name 01/10/25 1010          Vital Signs    Pre Systolic BP Rehab 119  -MR     Pre Treatment Diastolic BP 76  -MR     Post Systolic BP Rehab 123  -MR     Post Treatment Diastolic BP 79  -MR     Pre SpO2 (%) 94  -MR     O2 Delivery Pre Treatment room air  -MR     O2 Delivery Intra Treatment room air  -MR     Post SpO2 (%) 95  -MR     O2 Delivery Post Treatment room air  -MR     Pre Patient Position Sitting  -MR     Intra Patient Position Standing  -MR     Post Patient Position Sitting  -MR       Row Name 01/10/25 1010          Positioning and Restraints    Pre-Treatment Position sitting in chair/recliner  -MR     Post Treatment Position chair  -MR     In Chair notified nsg;reclined;sitting;call light within reach;encouraged to call for assist;exit alarm on;with family/caregiver;waffle cushion;legs elevated;compression device  -MR               User Key  (r) = Recorded By, (t) = Taken By, (c) = Cosigned By      Initials Name Provider Type    Kenya Sparks, OT Occupational Therapist                   Outcome Measures       Row Name 01/10/25 1015          How much help from another is currently needed...    Putting on and taking off regular lower body clothing? 2  -MR     Bathing (including washing, rinsing, and drying) 2  -MR      Toileting (which includes using toilet bed pan or urinal) 3  -MR     Putting on and taking off regular upper body clothing 3  -MR     Taking care of personal grooming (such as brushing teeth) 3  -MR     Eating meals 4  -MR     AM-PAC 6 Clicks Score (OT) 17  -MR       Row Name 01/10/25 1015          Functional Assessment    Outcome Measure Options AM-PAC 6 Clicks Daily Activity (OT)  -MR               User Key  (r) = Recorded By, (t) = Taken By, (c) = Cosigned By      Initials Name Provider Type    Kenya Sparks, OT Occupational Therapist                    Occupational Therapy Education       Title: PT OT SLP Therapies (Done)       Topic: Occupational Therapy (Done)       Point: ADL training (Done)       Description:   Instruct learner(s) on proper safety adaptation and remediation techniques during self care or transfers.   Instruct in proper use of assistive devices.                  Learning Progress Summary            Patient Acceptance, E, VU by MR at 1/10/2025 1016   Family Acceptance, E, VU by MR at 1/10/2025 1016                      Point: Home exercise program (Done)       Description:   Instruct learner(s) on appropriate technique for monitoring, assisting and/or progressing therapeutic exercises/activities.                  Learning Progress Summary            Patient Acceptance, E, VU by MR at 1/10/2025 1016   Family Acceptance, E, VU by MR at 1/10/2025 1016                      Point: Precautions (Done)       Description:   Instruct learner(s) on prescribed precautions during self-care and functional transfers.                  Learning Progress Summary            Patient Acceptance, E, VU by MR at 1/10/2025 1016   Family Acceptance, E, VU by MR at 1/10/2025 1016                      Point: Body mechanics (Done)       Description:   Instruct learner(s) on proper positioning and spine alignment during self-care, functional mobility activities and/or exercises.                  Learning Progress  Summary            Patient Acceptance, E, VU by MR at 1/10/2025 1016   Family Acceptance, E, VU by MR at 1/10/2025 1016                                      User Key       Initials Effective Dates Name Provider Type Discipline    MR 09/22/22 -  Kenya Lockwood OT Occupational Therapist OT                  OT Recommendation and Plan  Planned Therapy Interventions (OT): activity tolerance training, adaptive equipment training, BADL retraining, functional balance retraining, transfer/mobility retraining, strengthening exercise, ROM/therapeutic exercise, patient/caregiver education/training, passive ROM/stretching, IADL retraining, occupation/activity based interventions  Therapy Frequency (OT): daily  Plan of Care Review  Plan of Care Reviewed With: patient, spouse  Progress: no change  Outcome Evaluation: Patient presenting below his functional baseline w/ mobility, balance and transfers d/t back pain limiting independence w/ ADLs. Pt requiring assist for LB dressing, transfers and HH distances of functional mobility. OT issued and educated pt on use of AE for maximum independence w/ ADLs. Recommend home w/ assist at d/c for best functional outcome.     Time Calculation:   Evaluation Complexity (OT)  Review Occupational Profile/Medical/Therapy History Complexity: brief/low complexity  Assessment, Occupational Performance/Identification of Deficit Complexity: 1-3 performance deficits  Clinical Decision Making Complexity (OT): problem focused assessment/low complexity  Overall Complexity of Evaluation (OT): low complexity     Time Calculation- OT       Row Name 01/10/25 1017             Time Calculation- OT    OT Start Time 0845  -MR      OT Received On 01/10/25  -MR      OT Goal Re-Cert Due Date 01/20/25  -MR         Untimed Charges    OT Eval/Re-eval Minutes 46  -MR         Total Minutes    Untimed Charges Total Minutes 46  -MR       Total Minutes 46  -MR                User Key  (r) = Recorded By, (t) = Taken By, (c)  = Cosigned By      Initials Name Provider Type    Kenya Sparks, DINA Occupational Therapist                  Therapy Charges for Today       Code Description Service Date Service Provider Modifiers Qty    13092161812 HC OT EVAL LOW COMPLEXITY 4 1/10/2025 Kenya Lockwood OT GO 1                 Kenya Lockwood OT  1/10/2025

## 2025-01-10 NOTE — PLAN OF CARE
Goal Outcome Evaluation:                                            Problem: Adult Inpatient Plan of Care  Goal: Absence of Hospital-Acquired Illness or Injury  Intervention: Identify and Manage Fall Risk  Recent Flowsheet Documentation  Taken 1/10/2025 0400 by Arron Torres RN  Safety Promotion/Fall Prevention:   activity supervised   safety round/check completed   toileting scheduled  Taken 1/10/2025 0200 by Arron Torres RN  Safety Promotion/Fall Prevention:   activity supervised   safety round/check completed   toileting scheduled  Taken 1/10/2025 0000 by Arron Torres RN  Safety Promotion/Fall Prevention:   activity supervised   toileting scheduled   safety round/check completed  Taken 1/9/2025 2200 by Arron Torres RN  Safety Promotion/Fall Prevention:   activity supervised   safety round/check completed   toileting scheduled  Taken 1/9/2025 2000 by Arron Torres RN  Safety Promotion/Fall Prevention:   activity supervised   safety round/check completed   toileting scheduled  Intervention: Prevent Skin Injury  Recent Flowsheet Documentation  Taken 1/10/2025 0400 by Arron Torres RN  Body Position: supine  Skin Protection: drying agents applied  Taken 1/10/2025 0200 by Arron Torres RN  Body Position: supine  Skin Protection: drying agents applied  Taken 1/10/2025 0000 by Arron Torres RN  Body Position: supine  Skin Protection: drying agents applied  Taken 1/9/2025 2200 by Arron Torres RN  Body Position: position changed independently  Skin Protection: drying agents applied  Taken 1/9/2025 2000 by Arron Torres RN  Body Position: supine  Skin Protection: drying agents applied  Intervention: Prevent and Manage VTE (Venous Thromboembolism) Risk  Recent Flowsheet Documentation  Taken 1/10/2025 0400 by Arron Torres RN  VTE Prevention/Management:   bilateral   SCDs (sequential compression devices) on  Taken 1/10/2025 0200 by Arron Torres RN  VTE Prevention/Management:    bilateral   SCDs (sequential compression devices) on  Taken 1/10/2025 0000 by Arron Torres RN  VTE Prevention/Management:   bilateral   SCDs (sequential compression devices) on  Taken 1/9/2025 2200 by Arron Torres RN  VTE Prevention/Management:   bilateral   SCDs (sequential compression devices) on  Taken 1/9/2025 2000 by Arron Torres RN  VTE Prevention/Management:   bilateral   SCDs (sequential compression devices) on  Intervention: Prevent Infection  Recent Flowsheet Documentation  Taken 1/10/2025 0400 by Arron Torres RN  Infection Prevention: environmental surveillance performed  Taken 1/10/2025 0200 by Arron Torres RN  Infection Prevention: environmental surveillance performed  Taken 1/10/2025 0000 by Arron Torres RN  Infection Prevention: environmental surveillance performed  Taken 1/9/2025 2200 by Arron Torres RN  Infection Prevention: environmental surveillance performed  Taken 1/9/2025 2000 by Arron Torres RN  Infection Prevention: environmental surveillance performed  Goal: Optimal Comfort and Wellbeing  Intervention: Monitor Pain and Promote Comfort  Recent Flowsheet Documentation  Taken 1/10/2025 0400 by Arron Torres RN  Pain Management Interventions: quiet environment facilitated  Taken 1/10/2025 0200 by Arron Torres RN  Pain Management Interventions: quiet environment facilitated  Taken 1/10/2025 0000 by Arron Torres RN  Pain Management Interventions: quiet environment facilitated  Taken 1/9/2025 2200 by Arron Torres RN  Pain Management Interventions: quiet environment facilitated  Taken 1/9/2025 2000 by Arron Torres RN  Pain Management Interventions: quiet environment facilitated  Intervention: Provide Person-Centered Care  Recent Flowsheet Documentation  Taken 1/10/2025 0400 by Arron Torres RN  Trust Relationship/Rapport: care explained  Taken 1/10/2025 0200 by Arron Torres RN  Trust Relationship/Rapport: care explained  Taken 1/10/2025 0000  by Arron Torres, RN  Trust Relationship/Rapport: care explained  Taken 1/9/2025 2200 by Arron Torres, RN  Trust Relationship/Rapport: care explained  Taken 1/9/2025 2000 by Arron Torres, RN  Trust Relationship/Rapport: care explained       VSS, voids well, ambulates with 1 assist, will continue to monitor for changes.

## 2025-01-10 NOTE — PLAN OF CARE
Goal Outcome Evaluation:  Plan of Care Reviewed With: patient, spouse        Progress: no change  Outcome Evaluation: Patient presenting below his functional baseline w/ mobility, balance and transfers d/t back pain limiting independence w/ ADLs. Pt requiring assist for LB dressing, transfers and HH distances of functional mobility. OT issued and educated pt on use of AE for maximum independence w/ ADLs. Recommend home w/ assist at d/c for best functional outcome.    Anticipated Discharge Disposition (OT): home with assist

## 2025-01-10 NOTE — THERAPY TREATMENT NOTE
Patient Name: Jaylan Moran  : 1970    MRN: [id number]                              Today's Date: 1/10/2025       Admit Date: 2025    Visit Dx: No diagnosis found.  Patient Active Problem List   Diagnosis    Carpal tunnel syndrome of right wrist    Cervical spondylosis with radiculopathy    Status post cervical spinal fusion    Spinal stenosis, lumbar region, with neurogenic claudication    Sacroiliac joint dysfunction of right side    S/P lumbar laminectomy    Degenerative disc disease, lumbar    Benign prostatic hyperplasia with urinary obstruction    Lumbar stenosis with neurogenic claudication     Past Medical History:   Diagnosis Date    Arthritis     Arthritis of back     Arthritis of neck     Had surgery    Back problem     Carpal tunnel syndrome     RIGHT    Cervical disc disorder     Had surgery    Claustrophobia     GERD (gastroesophageal reflux disease)     History of anemia     Low back pain     Low back strain     Lumbosacral disc disease     Neck strain     Sleep apnea     mouth guard    Thoracic disc disorder     Wears glasses      Past Surgical History:   Procedure Laterality Date    ANTERIOR CERVICAL DISCECTOMY W/ FUSION N/A 2017    Procedure: CERVICAL DISCECTOMY ANTERIOR WITH FUSION C4-7;  Surgeon: Morgan Escobar MD;  Location:  [facility];  Service:     BACK SURGERY  2021    laminectomy    CARPAL TUNNEL RELEASE Right 2016    Procedure: RIGHT CARPAL TUNNEL RELEASE;  Surgeon: Morgan Escobar MD;  Location:  [facility];  Service:     COLONOSCOPY      CYSTOSCOPY TRANSURETHRAL RESECTION OF PROSTATE N/A 2021    Procedure: CYSTOSCOPY, THULIUM LASER ABLATION OF PROSTATE;  Surgeon: Abdelrahman Calero MD;  Location:  [facility];  Service: Urology;  Laterality: N/A;    ENDOSCOPY      KNEE ARTHROSCOPY Right 2024    partially torn meniscus    LUMBAR LAMINECTOMY DISCECTOMY DECOMPRESSION N/A 2021    Procedure: LUMBAR LAMINECTOMY  DISCECTOMY DECOMPRESSION POSTERIOR L2-5;  Surgeon: Morgan Escobar MD;  Location:  [facility];  Service: Neurosurgery;  Laterality: N/A;    LUMBAR LAMINECTOMY WITH FUSION N/A 1/9/2025    Procedure: LUMBAR FUSION DECOMPRESSON WITH PEDICLE SCREWS L2-3;  Surgeon: Morgan Escobar MD;  Location:  [facility];  Service: Neurosurgery;  Laterality: N/A;    NASAL SEPTUM SURGERY      NECK SURGERY  5/2016    OTHER SURGICAL HISTORY  12/11/2023    L2-5 Intracept procedure- Dr. Hai Botello    SHOULDER SURGERY Right 12/10/2021    shoulder arthroscopy with RCR, biceps tenodesis - JRT      General Information       [facility] Name 01/10/25 1330          Physical Therapy Time and Intention    Document Type discharge treatment  -     Mode of Treatment physical therapy  -[facility] Name 01/10/25 8038          General Information    Patient Profile Reviewed yes  -     Existing Precautions/Restrictions fall;spinal;other (see comments)  Hemovac  -     Barriers to Rehab none identified  -[facility] Name 01/10/25 1407          Cognition    Orientation Status (Cognition) oriented x 4  -[facility] Name 01/10/25 1335          Safety Issues/Impairments Affecting Functional Mobility    Safety Issues Affecting Function (Mobility) insight into deficits/self-awareness  -     Impairments Affecting Function (Mobility) endurance/activity tolerance;pain;strength;range of motion (ROM)  -               User Key  (r) = Recorded By, (t) = Taken By, (c) = Cosigned By      Initials Name Provider Type     Sharri Quispe PT Physical Therapist                   Mobility       Row Name 01/10/25 133          Bed Mobility    Comment, (Bed Mobility) [facility] pre/post tx. Pt able to recall spinal precautions and sequencing of log rolling technique  -[facility] Name 01/10/25 9028          Transfers    Comment, (Transfers) No cues needed  -[facility] Name 01/10/25 1333          Sit-Stand Transfer    Sit-Stand Glenn (Transfers) standby assist  -        Row Name 01/10/25 1338          Gait/Stairs (Locomotion)    [place] Level (Gait) standby assist;verbal cues  -     Patient was able to Ambulate yes  -     Distance in Feet (Gait) 800  -     Deviations/Abnormal Patterns (Gait) juan m decreased;stride length decreased  -     Bilateral Gait Deviations heel strike decreased  -     [place] Level (Stairs) contact guard;verbal cues  -     Handrail Location (Stairs) right side (ascending);right side (descending)  -     Number of Steps (Stairs) 5 x2  -     Ascending Technique (Stairs) step-to-step  -     Descending Technique (Stairs) step-to-step  -     Comment, (Gait/Stairs) Pt ambulated w/o AD and demo step through gait pattern w/ decreased step length. VCs for forward gaze. No LOB noted. Pt navigated 5 steps x2 using R handrail. Pt educated to perform step-to-step technique for improved stability. No LOB noted. Pt denied dizziness  -               User Key  (r) = Recorded By, (t) = Taken By, (c) = Cosigned By      Initials Name Provider Type     Sharri Quispe PT Physical Therapist                   Obj/Interventions       Row Name 01/10/25 1346          Motor Skills    Therapeutic Exercise hip;knee;ankle;other (see comments);shoulder  abdominal sets x10, B bent knee fall out x10  -       Row Name 01/10/25 1346          Shoulder (Therapeutic Exercise)    Shoulder (Therapeutic Exercise) AROM (active range of motion)  -     Shoulder AROM (Therapeutic Exercise) bilateral;flexion;extension;10 repetitions  -       Row Name 01/10/25 1346          Hip (Therapeutic Exercise)    Hip (Therapeutic Exercise) isometric exercises;AROM (active range of motion)  -     Hip AROM (Therapeutic Exercise) bilateral;external rotation;internal rotation;10 repetitions  -     Hip Isometrics (Therapeutic Exercise) bilateral;gluteal sets;10 repetitions  -       Row Name 01/10/25 1346          Knee (Therapeutic Exercise)    Knee (Therapeutic  Exercise) isometric exercises;strengthening exercise  -     Knee Isometrics (Therapeutic Exercise) bilateral;quad sets;10 repetitions  -     Knee Strengthening (Therapeutic Exercise) bilateral;heel slides;10 repetitions  -       Row Name 01/10/25 1346          Ankle (Therapeutic Exercise)    Ankle (Therapeutic Exercise) AROM (active range of motion)  -     Ankle AROM (Therapeutic Exercise) bilateral;dorsiflexion;plantarflexion;10 repetitions  -       Row Name 01/10/25 1346          Balance    Balance Assessment sitting static balance;sitting dynamic balance;standing static balance;standing dynamic balance  -     Static Sitting Balance independent  -     Dynamic Sitting Balance supervision  -     Position, Sitting Balance unsupported;sitting in chair  -     Static Standing Balance standby assist  -     Dynamic Standing Balance standby assist;contact guard;verbal cues  -     Position/Device Used, Standing Balance unsupported  -     Balance Interventions sitting;standing;supported;sit to stand;static;dynamic  -               User Key  (r) = Recorded By, (t) = Taken By, (c) = Cosigned By      Initials Name Provider Type     Sharri Quispe, MAREN Physical Therapist                   Goals/Plan    No documentation.                  Clinical Impression       Row Name 01/10/25 1347          Pain    Pretreatment Pain Rating 4/10  -     Posttreatment Pain Rating 5/10  -     Pain Location back  -     Pain Side/Orientation other (see comments)  incisional  -     Pain Management Interventions activity modification encouraged;exercise or physical activity utilized;nursing notified  -     Response to Pain Interventions activity level improved;functional ability improved;activity participation with increased pain  -       Row Name 01/10/25 1347          Plan of Care Review    Plan of Care Reviewed With patient;spouse  -     Progress improving  -     Outcome Evaluation Pt demonstrating  improvements this date by ambulating 800 ft unsupported w/ SBA. He also navigated 5 steps x2 using R handrail, CGA. No LOB or unsteadiness noted. Pt continues to present below baseline function d/t pain and deficits in strength, balance, and activity tolerance. Continue w/ IP PT POC. Pt is functionally cleared by PT to d/c home w/ family assist once medically appropriate.  -       Row Name 01/10/25 1347          Therapy Assessment/Plan (PT)    Patient/Family Therapy Goals Statement (PT) to go home  -       Row Name 01/10/25 1347          Vital Signs    Pre Systolic BP Rehab 123  -     Pre Treatment Diastolic BP 79  -       Row Name 01/10/25 1347          Positioning and Restraints    Pre-Treatment Position sitting in chair/recliner  -     Post Treatment Position chair  -     In Chair notified nsg;reclined;sitting;call light within reach;encouraged to call for assist;with family/caregiver;exit alarm on;waffle cushion;legs elevated  -               User Key  (r) = Recorded By, (t) = Taken By, (c) = Cosigned By      Initials Name Provider Type     Sharri Quispe, PT Physical Therapist                   Outcome Measures       Row Name 01/10/25 1353 01/10/25 0810       How much help from another person do you currently need...    Turning from your back to your side while in flat bed without using bedrails? 3  - 3  -SC    Moving from lying on back to sitting on the side of a flat bed without bedrails? 3  - 3  -SC    Moving to and from a bed to a chair (including a wheelchair)? 3  - 3  -SC    Standing up from a chair using your arms (e.g., wheelchair, bedside chair)? 3  - 3  -SC    Climbing 3-5 steps with a railing? 3  - 3  -SC    To walk in hospital room? 3  - 3  -SC    AM-PAC 6 Clicks Score (PT) 18  - 18  -SC    Highest Level of Mobility Goal 6 --> Walk 10 steps or more  - 6 --> Walk 10 steps or more  -SC      Row Name 01/10/25 1353 01/10/25 1015       Functional Assessment    Outcome  Measure Options AM-PAC 6 Clicks Basic Mobility (PT)  - AM-PAC 6 Clicks Daily Activity (OT)  -MR              User Key  (r) = Recorded By, (t) = Taken By, (c) = Cosigned By      Initials Name Provider Type    Sena Hudson, RN Registered Nurse    eKnya Sparks, OT Occupational Therapist     Sharri Quispe, PT Physical Therapist                                 Physical Therapy Education       Title: PT OT SLP Therapies (Done)       Topic: Physical Therapy (Done)       Point: Mobility training (Done)       Learning Progress Summary            Patient Acceptance, E, VU,DU,NR by  at 1/10/2025 1312    Acceptance, E, VU by  at 1/9/2025 1656   Family Acceptance, E, VU,DU,NR by  at 1/10/2025 1312                      Point: Home exercise program (Done)       Learning Progress Summary            Patient Acceptance, E, VU,DU,NR by  at 1/10/2025 1312    Acceptance, E, VU by  at 1/9/2025 1656   Family Acceptance, E, VU,DU,NR by  at 1/10/2025 1312                      Point: Body mechanics (Done)       Learning Progress Summary            Patient Acceptance, E, VU,DU,NR by  at 1/10/2025 1312    Acceptance, E, VU by  at 1/9/2025 1656   Family Acceptance, E, VU,DU,NR by  at 1/10/2025 1312                      Point: Precautions (Done)       Learning Progress Summary            Patient Acceptance, E, VU,DU,NR by  at 1/10/2025 1312    Acceptance, E, VU by  at 1/9/2025 1656   Family Acceptance, E, VU,DU,NR by  at 1/10/2025 1312                                      User Key       Initials Effective Dates Name Provider Type Discipline     09/21/23 -  Sharri Quispe, PT Physical Therapist PT     07/11/24 -  Evelyn Ruiz, PT Physical Therapist PT                  PT Recommendation and Plan     Progress: improving  Outcome Evaluation: Pt demonstrating improvements this date by ambulating 800 ft unsupported w/ SBA. He also navigated 5 steps x2 using R handrail, CGA. No LOB or unsteadiness noted.  Pt continues to present below baseline function d/t pain and deficits in strength, balance, and activity tolerance. Continue w/ IP PT POC. Pt is functionally cleared by PT to d/c home w/ family assist once medically appropriate.     Time Calculation:         PT Charges       Row Name 01/10/25 1354             Time Calculation    Start Time 1312  -LH      PT Received On 01/10/25  -      PT Goal Re-Cert Due Date 01/19/25  -         Timed Charges    34424 - PT Therapeutic Exercise Minutes 10  -LH      29818 - Gait Training Minutes  13  -         Total Minutes    Timed Charges Total Minutes 23  -       Total Minutes 23  -                User Key  (r) = Recorded By, (t) = Taken By, (c) = Cosigned By      Initials Name Provider Type     Sharri Quispe, PT Physical Therapist                  Therapy Charges for Today       Code Description Service Date Service Provider Modifiers Qty    07863026342 HC PT THER PROC EA 15 MIN 1/10/2025 Sharri Quispe, PT GP 1    67421024430 HC GAIT TRAINING EA 15 MIN 1/10/2025 Sharri Quispe, PT GP 1            PT G-Codes  Outcome Measure Options: AM-PAC 6 Clicks Basic Mobility (PT)  AM-PAC 6 Clicks Score (PT): 18  AM-PAC 6 Clicks Score (OT): 17  PT Discharge Summary  Anticipated Discharge Disposition (PT): home with assist    Sharri Quispe PT  1/10/2025

## 2025-01-10 NOTE — CASE MANAGEMENT/SOCIAL WORK
"Continued Stay Note  [facility]     Patient Name: Jaylan Moran  MRN: [id number]  Today's Date: 1/10/2025    Admit Date: 1/9/2025    Plan: Home with wife's assistance   Discharge Plan       Row Name 01/10/25 1509       Plan    Plan Home with wife's assistance    Patient/Family in Agreement with Plan yes    Plan Comments Met with Mr. Moran, and his wife, Monica, at the bedside, for discharge planning.  Mr. Moran lives with his wife in [facility].    He states that prior to admission, he ambulated independently and was independent with her ADL's.  He states that he has never been to [facility] or used home health in the past.    He has been evaluated by PT and per notes, \"Pt demonstrating improvements this date by ambulating 800 ft unsupported w/ SBA. He also navigated 5 steps x2 using R handrail, CGA. No LOB or unsteadiness noted. Pt continues to present below baseline function d/t pain and deficits in strength, balance, and activity tolerance. Continue w/ IP PT POC. Pt is functionally cleared by PT to d/c home w/ family assist once medically appropriate.\"  The patient declines any DME needs or home health/outpatient needs.    PCP is Symone Sanchez.  Insurance is [employer].  No ACP documents.    DC plan is to return home with his wife's assistance.  Mr. Moran's wife will be transporting him home when discharged.    CM will continue to follow to assist with discharge needs.    Final Discharge Disposition Code 01 - home or self-care                    Adrienne Parnell RN    "

## 2025-01-10 NOTE — PLAN OF CARE
Goal Outcome Evaluation:  Plan of Care Reviewed With: patient, spouse        Progress: improving  Outcome Evaluation: Pt demonstrating improvements this date by ambulating 800 ft unsupported w/ SBA. He also navigated 5 steps x2 using R handrail, CGA. No LOB or unsteadiness noted. Pt continues to present below baseline function d/t pain and deficits in strength, balance, and activity tolerance. Continue w/ IP PT POC. Pt is functionally cleared by PT to d/c home w/ family assist once medically appropriate.    Anticipated Discharge Disposition (PT): home with assist

## 2025-01-11 VITALS
HEART RATE: 82 BPM | RESPIRATION RATE: 18 BRPM | OXYGEN SATURATION: 95 % | SYSTOLIC BLOOD PRESSURE: 145 MMHG | TEMPERATURE: 98 F | DIASTOLIC BLOOD PRESSURE: 92 MMHG

## 2025-01-11 PROCEDURE — 99024 POSTOP FOLLOW-UP VISIT: CPT | Performed by: NEUROLOGICAL SURGERY

## 2025-01-11 PROCEDURE — 97116 GAIT TRAINING THERAPY: CPT

## 2025-01-11 PROCEDURE — 97110 THERAPEUTIC EXERCISES: CPT

## 2025-01-11 RX ORDER — OXYCODONE AND ACETAMINOPHEN 5; 325 MG/1; MG/1
1 TABLET ORAL 3 TIMES DAILY PRN
Qty: 15 TABLET | Refills: 0 | Status: SHIPPED | OUTPATIENT
Start: 2025-01-11

## 2025-01-11 RX ADMIN — SENNOSIDES AND DOCUSATE SODIUM 2 TABLET: 50; 8.6 TABLET ORAL at 08:32

## 2025-01-11 RX ADMIN — OXYCODONE HYDROCHLORIDE AND ACETAMINOPHEN 1 TABLET: 7.5; 325 TABLET ORAL at 09:23

## 2025-01-11 RX ADMIN — POLYETHYLENE GLYCOL 3350 17 G: 17 POWDER, FOR SOLUTION ORAL at 04:01

## 2025-01-11 RX ADMIN — PANTOPRAZOLE SODIUM 40 MG: 40 TABLET, DELAYED RELEASE ORAL at 06:03

## 2025-01-11 RX ADMIN — OXYCODONE HYDROCHLORIDE AND ACETAMINOPHEN 1 TABLET: 7.5; 325 TABLET ORAL at 04:02

## 2025-01-11 NOTE — PLAN OF CARE
Goal Outcome Evaluation:  Plan of Care Reviewed With: patient        Progress: improving  Outcome Evaluation: Pt ambulated 700 ft this date unsupported w/ SBA. Pt's goals were reviewed and he has made good progress toward all goals and reports ambulating frequently w/ family and nursing staff. Pt able to recall spinal precautions and demo good sequencing of log rolling technique. Reviewed HEP. Pt continues to be limited by incisional pain and mild deficits in strength and balance. PT will sign off and encouraged pt to frequently ambulate. Recommend d/c home w/ family.    Anticipated Discharge Disposition (PT): home with assist

## 2025-01-11 NOTE — DISCHARGE INSTR - LAB
Patient or family member needs to call [phone] to schedule 3 week follow up with physician's assistant with AP and Lateral Lumbar Spine Xrays

## 2025-01-11 NOTE — PLAN OF CARE
Problem: Adult Inpatient Plan of Care  Goal: Absence of Hospital-Acquired Illness or Injury  Outcome: Progressing  Intervention: Identify and Manage Fall Risk  Recent Flowsheet Documentation  Taken 1/11/2025 0400 by Krystin Calzada RN  Safety Promotion/Fall Prevention:   activity supervised   safety round/check completed  Taken 1/11/2025 0200 by Krystin Calzada RN  Safety Promotion/Fall Prevention:   fall prevention program maintained   lighting adjusted   safety round/check completed  Taken 1/11/2025 0000 by Krystin Calzada RN  Safety Promotion/Fall Prevention:   activity supervised   fall prevention program maintained   safety round/check completed  Taken 1/10/2025 2200 by Krystin Calzada RN  Safety Promotion/Fall Prevention:   fall prevention program maintained   safety round/check completed  Taken 1/10/2025 2000 by Krystin Calzada RN  Safety Promotion/Fall Prevention:   activity supervised   gait belt   fall prevention program maintained   safety round/check completed   nonskid shoes/slippers when out of bed  Intervention: Prevent Skin Injury  Recent Flowsheet Documentation  Taken 1/11/2025 0400 by Krystin Calzada RN  Body Position: position changed independently  Skin Protection: incontinence pads utilized  Taken 1/11/2025 0200 by Krystin Calzada RN  Body Position: position changed independently  Skin Protection: incontinence pads utilized  Taken 1/11/2025 0000 by Krystin Calzada RN  Body Position: position changed independently  Skin Protection: incontinence pads utilized  Taken 1/10/2025 2200 by Krystin Calzada RN  Body Position: position changed independently  Skin Protection:   incontinence pads utilized   skin sealant/moisture barrier applied  Taken 1/10/2025 2000 by Krystin Calzada RN  Body Position: position changed independently  Skin Protection: drying agents applied  Intervention: Prevent and Manage VTE (Venous Thromboembolism) Risk  Recent Flowsheet Documentation  Taken 1/10/2025 2000 by  [place], Krystin, RN  VTE Prevention/Management:   bilateral   SCDs (sequential compression devices) off  Intervention: Prevent Infection  Recent Flowsheet Documentation  Taken 1/11/2025 0400 by Krystin Calzada RN  Infection Prevention: rest/sleep promoted  Taken 1/10/2025 2000 by Krystin Calzada RN  Infection Prevention:   hand hygiene promoted   rest/sleep promoted  Goal: Optimal Comfort and Wellbeing  Outcome: Progressing  Intervention: Monitor Pain and Promote Comfort  Recent Flowsheet Documentation  Taken 1/11/2025 0402 by Krystin Calzada RN  Pain Management Interventions: pain medication given  Taken 1/10/2025 2111 by Krystin Calzada RN  Pain Management Interventions: pain medication given  Taken 1/10/2025 2000 by Krystin Calzada RN  Pain Management Interventions:   care clustered   diversional activity provided   pillow support provided  Intervention: Provide Person-Centered Care  Recent Flowsheet Documentation  Taken 1/10/2025 2000 by Krystin Calzada RN  Trust Relationship/Rapport:   care explained   choices provided   emotional support provided  Goal: Readiness for Transition of Care  Outcome: Progressing   Goal Outcome Evaluation:

## 2025-01-11 NOTE — THERAPY DISCHARGE NOTE
Patient Name: Jaylan Moran  : 1970    MRN: [id number]                              Today's Date: 2025       Admit Date: 2025    Visit Dx:     ICD-10-CM ICD-9-CM   1. Lumbar stenosis with neurogenic claudication  M48.062 724.03     Patient Active Problem List   Diagnosis    Carpal tunnel syndrome of right wrist    Cervical spondylosis with radiculopathy    Status post cervical spinal fusion    Spinal stenosis, lumbar region, with neurogenic claudication    Sacroiliac joint dysfunction of right side    S/P lumbar laminectomy    Degenerative disc disease, lumbar    Benign prostatic hyperplasia with urinary obstruction    Lumbar stenosis with neurogenic claudication     Past Medical History:   Diagnosis Date    Arthritis     Arthritis of back     Arthritis of neck     Had surgery    Back problem     Carpal tunnel syndrome     RIGHT    Cervical disc disorder     Had surgery    Claustrophobia     GERD (gastroesophageal reflux disease)     History of anemia     Low back pain     Low back strain     Lumbosacral disc disease     Neck strain     Sleep apnea     mouth guard    Thoracic disc disorder     Wears glasses      Past Surgical History:   Procedure Laterality Date    ANTERIOR CERVICAL DISCECTOMY W/ FUSION N/A 2017    Procedure: CERVICAL DISCECTOMY ANTERIOR WITH FUSION C4-7;  Surgeon: Morgan Escobar MD;  Location:  [facility];  Service:     BACK SURGERY  2021    laminectomy    CARPAL TUNNEL RELEASE Right 2016    Procedure: RIGHT CARPAL TUNNEL RELEASE;  Surgeon: Morgan Escobar MD;  Location:  [facility];  Service:     COLONOSCOPY      CYSTOSCOPY TRANSURETHRAL RESECTION OF PROSTATE N/A 2021    Procedure: CYSTOSCOPY, THULIUM LASER ABLATION OF PROSTATE;  Surgeon: Abdelrahman Calero MD;  Location:  [facility];  Service: Urology;  Laterality: N/A;    ENDOSCOPY      KNEE ARTHROSCOPY Right 2024    partially torn meniscus    LUMBAR LAMINECTOMY  DISCECTOMY DECOMPRESSION N/A 02/11/2021    Procedure: LUMBAR LAMINECTOMY DISCECTOMY DECOMPRESSION POSTERIOR L2-5;  Surgeon: Morgan Escobar MD;  Location:  [facility];  Service: Neurosurgery;  Laterality: N/A;    LUMBAR LAMINECTOMY WITH FUSION N/A 1/9/2025    Procedure: LUMBAR FUSION DECOMPRESSON WITH PEDICLE SCREWS L2-3;  Surgeon: Morgan Escobar MD;  Location:  [facility];  Service: Neurosurgery;  Laterality: N/A;    NASAL SEPTUM SURGERY      NECK SURGERY  5/2016    OTHER SURGICAL HISTORY  12/11/2023    L2-5 Intracept procedure- Dr. Hai Botello    SHOULDER SURGERY Right 12/10/2021    shoulder arthroscopy with RCR, biceps tenodesis - [facility]      General Information       Row Name 01/11/25 0900          Physical Therapy Time and Intention    Document Type discharge treatment  -     Mode of Treatment physical therapy  -       Row Name 01/11/25 0900          General Information    Patient Profile Reviewed yes  -     Existing Precautions/Restrictions fall;spinal;other (see comments)  Hemovac  -     Barriers to Rehab none identified  -       Row Name 01/11/25 0900          Cognition    Orientation Status (Cognition) oriented x 4  -[facility] Name 01/11/25 0900          Safety Issues/Impairments Affecting Functional Mobility    Safety Issues Affecting Function (Mobility) insight into deficits/self-awareness  -     Impairments Affecting Function (Mobility) endurance/activity tolerance;pain;strength;range of motion (ROM)  -               User Key  (r) = Recorded By, (t) = Taken By, (c) = Cosigned By      Initials Name Provider Type     Sharri Quispe PT Physical Therapist                   Mobility       Row Name 01/11/25 0901          Bed Mobility    Bed Mobility rolling left;sidelying-sit  -     Rolling Left [place] (Bed Mobility) contact guard;verbal cues  -     Sidelying-Sit Fidelity (Bed Mobility) contact guard;verbal cues  -     Assistive Device (Bed Mobility) bed rails;head of bed  elevated  -     Comment, (Bed Mobility) VCs for hand placement and sequencing for log rolling technique  -       Row Name 01/11/25 0901          Sit-Stand Transfer    Sit-Stand [place] (Transfers) independent  -[facility] Name 01/11/25 0901          Gait/Stairs (Locomotion)    [place] Level (Gait) standby assist;verbal cues  -     Distance in Feet (Gait) 700  -     Deviations/Abnormal Patterns (Gait) juan m decreased;stride length decreased  -     Bilateral Gait Deviations heel strike decreased  -     Comment, (Gait/Stairs) Pt demo step through gait pattern w/ decreased step length and B foot clearance. Cues for increased step length. No LOB noted. Distance limited by fatigue and pain d/t coughing  -               User Key  (r) = Recorded By, (t) = Taken By, (c) = Cosigned By      Initials Name Provider Type     Sharri Quispe, PT Physical Therapist                   Obj/Interventions       [facility] Name 01/11/25 1001          Motor Skills    Therapeutic Exercise hip;knee;ankle;shoulder;other (see comments)  abdominal sets x10, B bent knee fall out x10  -[facility] Name 01/11/25 1001          Shoulder (Therapeutic Exercise)    Shoulder (Therapeutic Exercise) AROM (active range of motion)  -     Shoulder AROM (Therapeutic Exercise) bilateral;flexion;extension;10 repetitions  -[facility] Name 01/11/25 1001          Hip (Therapeutic Exercise)    Hip (Therapeutic Exercise) isometric exercises  -     Hip AROM (Therapeutic Exercise) bilateral;external rotation;internal rotation;10 repetitions  -     Hip Isometrics (Therapeutic Exercise) bilateral;gluteal sets;10 repetitions  -[facility] Name 01/11/25 1001          Knee (Therapeutic Exercise)    Knee (Therapeutic Exercise) isometric exercises;strengthening exercise  -     Knee Isometrics (Therapeutic Exercise) bilateral;quad sets;10 repetitions  -     Knee Strengthening (Therapeutic Exercise) bilateral;heel slides;10 repetitions   -[facility] Name 01/11/25 1001          Ankle (Therapeutic Exercise)    Ankle (Therapeutic Exercise) AROM (active range of motion)  -     Ankle AROM (Therapeutic Exercise) bilateral;dorsiflexion;plantarflexion;10 repetitions  -[facility] Name 01/11/25 1001          Balance    Balance Assessment sitting static balance;sitting dynamic balance;standing static balance;standing dynamic balance  -     Static Sitting Balance independent  -     Dynamic Sitting Balance supervision  -     Position, Sitting Balance unsupported;sitting edge of bed  -     Static Standing Balance standby assist  -     Dynamic Standing Balance standby assist  -     Position/Device Used, Standing Balance unsupported  -     Balance Interventions sitting;standing;sit to stand;supported;static;dynamic  -               User Key  (r) = Recorded By, (t) = Taken By, (c) = Cosigned By      Initials Name Provider Type     Sharri Quispe, PT Physical Therapist                   Goals/Plan       [facility] Name 01/11/25 1007          Bed Mobility Goal 1 (PT)    Activity/Assistive Device (Bed Mobility Goal 1, PT) sit to supine/supine to sit  -     [place] Level/Cues Needed (Bed Mobility Goal 1, PT) modified independence  [facility]     Time Frame (Bed Mobility Goal 1, PT) long term goal (LTG);3 days  -     Progress/Outcomes (Bed Mobility Goal 1, PT) good progress toward goal  -[facility] Name 01/11/25 1007          Transfer Goal 1 (PT)    Activity/Assistive Device (Transfer Goal 1, PT) sit-to-stand/stand-to-sit  -     [place] Level/Cues Needed (Transfer Goal 1, PT) modified independence  -     Time Frame (Transfer Goal 1, PT) long term goal (LTG);3 days  -     Progress/Outcome (Transfer Goal 1, PT) good progress toward goal;goal partially met  -[facility] Name 01/11/25 1007          Gait Training Goal 1 (PT)    Activity/Assistive Device (Gait Training Goal 1, PT) gait (walking locomotion)  -     [place] Level (Gait  Training Goal 1, PT) modified independence  -     Distance (Gait Training Goal 1, PT) 200  -[facility]     Time Frame (Gait Training Goal 1, PT) long term goal (LTG);3 days  -     Progress/Outcome (Gait Training Goal 1, PT) good progress toward goal;goal partially met  -               User Key  (r) = Recorded By, (t) = Taken By, (c) = Cosigned By      Initials Name Provider Type     Sharri Quispe, PT Physical Therapist                   Clinical Impression       Row Name 01/11/25 1002          Pain    Pretreatment Pain Rating 4/10  -     Posttreatment Pain Rating 5/10  -     Pain Location back  -     Pain Side/Orientation lower;other (see comments)  incisional  -     Pain Management Interventions activity modification encouraged;exercise or physical activity utilized;positioning techniques utilized  -     Response to Pain Interventions activity level improved;functional ability improved;activity participation with increased pain  -       Row Name 01/11/25 1002          Plan of Care Review    Plan of Care Reviewed With patient  -     Progress improving  -     Outcome Evaluation Pt ambulated 700 ft this date unsupported w/ SBA. Pt's goals were reviewed and he has made good progress toward all goals and reports ambulating frequently w/ family and nursing staff. Pt able to recall spinal precautions and demo good sequencing of log rolling technique. Reviewed HEP. Pt continues to be limited by incisional pain and mild deficits in strength and balance. PT will sign off and encouraged pt to frequently ambulate. Recommend d/c home w/ family.  -       Row Name 01/11/25 1002          Positioning and Restraints    Pre-Treatment Position in bed  -     Post Treatment Position chair  -     In Chair notified nsg;reclined;sitting;call light within reach;encouraged to call for assist;exit alarm on;waffle cushion;legs elevated  -               User Key  (r) = Recorded By, (t) = Taken By, (c) = Cosigned By       Initials Name Provider Type     Sharri Quispe, MAREN Physical Therapist                   Outcome Measures       Row Name 01/11/25 1007 01/11/25 0923       How much help from another person do you currently need...    Turning from your back to your side while in flat bed without using bedrails? 4  - 4  -TS    Moving from lying on back to sitting on the side of a flat bed without bedrails? 3  - 4  -TS    Moving to and from a bed to a chair (including a wheelchair)? 3  - 3  -TS    Standing up from a chair using your arms (e.g., wheelchair, bedside chair)? 4  - 3  -TS    Climbing 3-5 steps with a railing? 3  - 3  -TS    To walk in hospital room? 3  - 3  -TS    AM-PAC 6 Clicks Score (PT) 20  - 20  -TS    Highest Level of Mobility Goal 6 --> Walk 10 steps or more  - 6 --> Walk 10 steps or more  -TS      Row Name 01/11/25 1007          Functional Assessment    Outcome Measure Options AM-PAC 6 Clicks Basic Mobility (PT)  -               User Key  (r) = Recorded By, (t) = Taken By, (c) = Cosigned By      Initials Name Provider Type     Yris Byrd, RN Registered Nurse     Sharri Quispe, PT Physical Therapist                  Physical Therapy Education       Title: PT OT SLP Therapies (Done)       Topic: Physical Therapy (Done)       Point: Mobility training (Done)       Learning Progress Summary            Patient Acceptance, E, VU,DU by  at 1/11/2025 0827    Acceptance, E, VU,DU,NR by  at 1/10/2025 1312    Acceptance, E, VU by  at 1/9/2025 1656   Family Acceptance, E, VU,DU,NR by  at 1/10/2025 1312                      Point: Home exercise program (Done)       Learning Progress Summary            Patient Acceptance, E, VU,DU by  at 1/11/2025 0827    Acceptance, E, VU,DU,NR by  at 1/10/2025 1312    Acceptance, E, VU by  at 1/9/2025 1656   Family Acceptance, E, VU,DU,NR by  at 1/10/2025 1312                      Point: Body mechanics (Done)       Learning Progress Summary             Patient Acceptance, E, VU,DU by  at 1/11/2025 0827    Acceptance, E, VU,DU,NR by  at 1/10/2025 1312    Acceptance, E, VU by  at 1/9/2025 1656   Family Acceptance, E, VU,DU,NR by  at 1/10/2025 1312                      Point: Precautions (Done)       Learning Progress Summary            Patient Acceptance, E, VU,DU by  at 1/11/2025 0827    Acceptance, E, VU,DU,NR by  at 1/10/2025 1312    Acceptance, E, VU by  at 1/9/2025 1656   Family Acceptance, E, VU,DU,NR by  at 1/10/2025 1312                                      User Key       Initials Effective Dates Name Provider Type [facility] 09/21/23 -  Sharri Quispe, PT Physical Therapist PT     07/11/24 -  Evelyn Ruzi PT Physical Therapist PT                  PT Recommendation and Plan     Progress: improving  Outcome Evaluation: Pt ambulated 700 ft this date unsupported w/ SBA. Pt's goals were reviewed and he has made good progress toward all goals and reports ambulating frequently w/ family and nursing staff. Pt able to recall spinal precautions and demo good sequencing of log rolling technique. Reviewed HEP. Pt continues to be limited by incisional pain and mild deficits in strength and balance. PT will sign off and encouraged pt to frequently ambulate. Recommend d/c home w/ family.     Time Calculation:         PT Charges       Row Name 01/11/25 1009             Time Calculation    Start Time 0827  -      PT Received On 01/11/25  -         Timed Charges    65882 - PT Therapeutic Exercise Minutes 12  -      21291 - Gait Training Minutes  12  -         Total Minutes    Timed Charges Total Minutes 24  -       Total Minutes 24  -                User Key  (r) = Recorded By, (t) = Taken By, (c) = Cosigned By      Initials Name Provider Type     Sharri Quispe, PT Physical Therapist                  Therapy Charges for Today       Code Description Service Date Service Provider Modifiers Qty    65671867005  PT THER PROC EA  15 MIN 1/10/2025 Sharri Quispe, PT GP 1    64387470280 HC GAIT TRAINING EA 15 MIN 1/10/2025 Sharri Quispe, PT GP 1    28557117374 HC PT THER PROC EA 15 MIN 1/11/2025 Sharri Quispe, PT GP 1    50870606830 HC GAIT TRAINING EA 15 MIN 1/11/2025 Sharri Quispe, PT GP 1            PT G-Codes  Outcome Measure Options: AM-PAC 6 Clicks Basic Mobility (PT)  AM-PAC 6 Clicks Score (PT): 20  AM-PAC 6 Clicks Score (OT): 17    PT Discharge Summary  Anticipated Discharge Disposition (PT): home with assist  Reason for Discharge: Maximum functional potential achieved, Independent  Outcomes Achieved: Patient able to partially acheive established goals, Refer to plan of care for updates on goals achieved  Discharge Destination: Home with assist    Sharri Quispe, PT  1/11/2025

## 2025-01-11 NOTE — DISCHARGE INSTRUCTIONS
[facility] COLD THERAPY - PATIENT INSTRUCTION SHEET    Cold Compression Therapy for your comfort and rehabilitation  Your caregivers want you to be productive in your rehab and comfortable during your stay. In keeping with those goals, you will be receiving an SMI Cold Therapy Wrap to help ease post-operative pain and swelling that might keep you from getting back on track! Your SMI Cold Therapy Wrap is effective and simple-to-use, and you will be encouraged to apply it throughout your hospital stay and at home through the duration of your recovery.    When you are ready to go home  Be sure to take your SMI Cold Therapy Wrap and both sets of Gel Bags with you for continued comfort and use throughout your rehabilitation. If you don't already have them, ask your nurse or aide to retrieve your SMI Gel Bags from the patient freezer.    Home use precautions  Always follow your medical professional's application instructions upon discharge. Your SMI Cold Therapy Wrap and Gel Bags are designed to last for months following your surgery. Never heat the Gel Bags unless specified by your healthcare provider. Supervision is advised when using this product on children or geriatric patients. To avoid danger of suffocation, please keep the outer plastic packaging away from children & pets.    Cold Therapy Instructions  Place Gel Bags in a freezer set ¾ of the way to max temperature for at least (4) hours. For best results, lay the Gel Bags flat and [id number] in the freezer. Once frozen, slide Gel Bags into the gel pouch and secure your wrap to the affected area with the straps.  Gel wraps that have been stored in a freezer for an extended period of time may require a (10) minute period of softening up in a room temperature environment before application.  The gel pouch acts as a protective barrier. NEVER place frozen bags directly onto skin, as this may cause frostbite injury.  The SMI Cold Therapy Wrap is designed to be able to be  worm while ambulating. The compression straps can be secured well enough so that the Wrap won't fall off while moving.  Wrap Application Videos can be viewed at [email or web address].  An additional protective barrier such as clothing, a washcloth, hand-towel or pillowcase may be used during prolonged treatment applications.  The Gel-Pouch and Wrap are both Latex-Free and the Gel Bag ingredients are non toxic.    [facility] Wrap care instructions  The [facility] Cold Therapy Wrap may be hand washed and hung to dry when needed.    [facility] re-order information  Additional [facility] body specific wraps and/or Gel Bags can be re-ordered from [email or web address] or call [employer] ([phone])

## 2025-01-11 NOTE — PLAN OF CARE
Problem: Adult Inpatient Plan of Care  Goal: Plan of Care Review  Outcome: Met  Goal: Patient-Specific Goal (Individualized)  Outcome: Met  Goal: Absence of Hospital-Acquired Illness or Injury  Outcome: Met  Intervention: Identify and Manage Fall Risk  Recent Flowsheet Documentation  Taken 1/11/2025 1215 by Yris Byrd RN  Safety Promotion/Fall Prevention:   activity supervised   assistive device/personal items within reach   clutter free environment maintained   fall prevention program maintained   gait belt   toileting scheduled   safety round/check completed   room organization consistent   nonskid shoes/slippers when out of bed  Taken 1/11/2025 1025 by Yris Byrd RN  Safety Promotion/Fall Prevention:   activity supervised   clutter free environment maintained   assistive device/personal items within reach   fall prevention program maintained   gait belt   toileting scheduled   safety round/check completed   room organization consistent   nonskid shoes/slippers when out of bed  Taken 1/11/2025 0923 by Yris Byrd RN  Safety Promotion/Fall Prevention:   assistive device/personal items within reach   activity supervised   clutter free environment maintained   fall prevention program maintained   gait belt   safety round/check completed   toileting scheduled   room organization consistent   nonskid shoes/slippers when out of bed  Intervention: Prevent Skin Injury  Recent Flowsheet Documentation  Taken 1/11/2025 1215 by Yris Byrd RN  Body Position: (up in chair) other (see comments)  Skin Protection: incontinence pads utilized  Taken 1/11/2025 1025 by Yris Byrd RN  Body Position: (up in chair) other (see comments)  Skin Protection: incontinence pads utilized  Taken 1/11/2025 0923 by Yris Byrd RN  Body Position: (up in chair) other (see comments)  Skin Protection: incontinence pads utilized  Intervention: Prevent and Manage VTE (Venous Thromboembolism) Risk  Recent  Flowsheet Documentation  Taken 1/11/2025 1215 by Yris Byrd RN  VTE Prevention/Management:   bilateral   SCDs (sequential compression devices) off  Taken 1/11/2025 1025 by Yris Byrd RN  VTE Prevention/Management:   bilateral   SCDs (sequential compression devices) off  Taken 1/11/2025 0923 by Yris Byrd RN  VTE Prevention/Management:   bilateral   SCDs (sequential compression devices) off  Intervention: Prevent Infection  Recent Flowsheet Documentation  Taken 1/11/2025 1215 by Yris Byrd RN  Infection Prevention: environmental surveillance performed  Taken 1/11/2025 1025 by Yris Byrd RN  Infection Prevention: environmental surveillance performed  Taken 1/11/2025 0923 by Yris Byrd RN  Infection Prevention: environmental surveillance performed  Goal: Optimal Comfort and Wellbeing  Outcome: Met  Intervention: Provide Person-Centered Care  Recent Flowsheet Documentation  Taken 1/11/2025 1215 by Yris Byrd RN  Trust Relationship/Rapport: care explained  Taken 1/11/2025 1025 by Yris Byrd RN  Trust Relationship/Rapport:   choices provided   care explained  Taken 1/11/2025 0923 by Yris Byrd RN  Trust Relationship/Rapport:   care explained   choices provided   questions encouraged   questions answered   reassurance provided   thoughts/feelings acknowledged  Goal: Readiness for Transition of Care  Outcome: Met   Goal Outcome Evaluation:

## 2025-01-11 NOTE — PLAN OF CARE
Dressing with shadow drainage. HV in place with 30ml out. Ambulated twice with staff in the [facility] and twice with family.  Assist of walker required.  . Good UOP noted.  No BM at this time.  Bowel regimen followed per orders.   Pain noted and treated with prn medication. VSS

## 2025-01-11 NOTE — PROGRESS NOTES
NEUROSURGERY PROGRESS NOTE     LOS: 0 days   Patient Care Team:  Symone Sanchez MD as PCP - General (Internal Medicine)  Symone Sanchez MD as Referring Physician (Internal Medicine)  Najma Pitt PA-C as Physician Assistant (Physician Assistant)    Chief Complaint: Low back and leg pain with walking and standing intolerance.    POD#: 2 Days Post-Op  Procedures:  L2-3 PLIF.    Interval History:   Patient Complaints: Incisional pain.  Patient Denies: Preoperative claudication symptoms or voiding difficulty.    Vital Signs: Blood pressure 145/92, pulse 82, temperature 98 °F (36.7 °C), temperature source Oral, resp. rate 18, SpO2 95%.  Intake/Output:   Intake/Output Summary (Last 24 hours) at 1/11/2025 0955  Last data filed at 1/11/2025 0735  Gross per 24 hour   Intake 222 ml   Output 1010 ml   Net -788 ml     Drain output: 60/100 mL    Physical Exam:  Patient is alert and sitting up in a recliner at bedside.  He is in good spirits.     Data Review:    Results from last 7 days   Lab Units 01/10/25  0832   HEMOGLOBIN g/dL 12.3*   HEMATOCRIT % 38.4         Assessment/Plan:  1.  L2-3 spondylolisthesis, stenosis, instability status post redo decompression with fusion and stabilization.  2.  Sleep apnea.  3.  Disposition: Drain out.  Home today.  Follow-up with [facility] in my office in approximately 3 weeks.      Morgan Escobar MD  01/11/25  09:55 EST

## 2025-01-13 ENCOUNTER — TELEPHONE (OUTPATIENT)
Dept: NEUROSURGERY | Facility: CLINIC | Age: 55
End: 2025-01-13

## 2025-01-13 DIAGNOSIS — M51.9 LUMBAR DISC DISEASE: ICD-10-CM

## 2025-01-13 DIAGNOSIS — M48.062 SPINAL STENOSIS OF LUMBAR REGION WITH NEUROGENIC CLAUDICATION: Primary | ICD-10-CM

## 2025-01-13 NOTE — TELEPHONE ENCOUNTER
Provider:  Shawn FERREIRA  Surgery/Procedure:   LUMBAR FUSION DECOMPRESSON W/ PEDICLE SCREWS L2-3   Surgery/Procedure Date:  1/9/25  Last visit:    Next visit:  1/31/25     Reason for call:  Patient called to schedule his post-op appointment. I returned his call; appointment is scheduled.     Two things:     Please pend an order for post-op AP and lateral lumbar spine x-rays.     2.   Patient asked about return to work plans. He stated after his last surgery he worked from home beginning about 2 weeks after surgery for half days. He would like to do this again, with a potential start date of 1/27/25 - work from home, beginning with a few hours at a time progressing up to half days, as tolerated. Please call patient to discuss.

## 2025-01-28 ENCOUNTER — HOSPITAL ENCOUNTER (OUTPATIENT)
Dept: GENERAL RADIOLOGY | Facility: HOSPITAL | Age: 55
Discharge: HOME | End: 2025-01-28
Admitting: NEUROLOGICAL SURGERY
Payer: COMMERCIAL

## 2025-01-28 DIAGNOSIS — M48.062 SPINAL STENOSIS OF LUMBAR REGION WITH NEUROGENIC CLAUDICATION: ICD-10-CM

## 2025-01-28 DIAGNOSIS — M51.9 LUMBAR DISC DISEASE: ICD-10-CM

## 2025-01-28 PROCEDURE — 72100 X-RAY EXAM L-S SPINE 2/3 VWS: CPT

## 2025-01-31 ENCOUNTER — OFFICE VISIT (OUTPATIENT)
Dept: NEUROSURGERY | Facility: CLINIC | Age: 55
End: 2025-01-31
Payer: COMMERCIAL

## 2025-01-31 ENCOUNTER — TELEPHONE (OUTPATIENT)
Dept: UROLOGY | Facility: CLINIC | Age: 55
End: 2025-01-31
Payer: COMMERCIAL

## 2025-01-31 ENCOUNTER — OFFICE VISIT (OUTPATIENT)
Dept: UROLOGY | Facility: CLINIC | Age: 55
End: 2025-01-31
Payer: COMMERCIAL

## 2025-01-31 VITALS — HEART RATE: 77 BPM | OXYGEN SATURATION: 97 % | DIASTOLIC BLOOD PRESSURE: 80 MMHG | SYSTOLIC BLOOD PRESSURE: 118 MMHG

## 2025-01-31 VITALS — HEIGHT: 70 IN | WEIGHT: 235 LBS | BODY MASS INDEX: 33.64 KG/M2 | TEMPERATURE: 97.7 F

## 2025-01-31 DIAGNOSIS — M51.9 LUMBAR DISC DISEASE: ICD-10-CM

## 2025-01-31 DIAGNOSIS — M43.16 SPONDYLOLISTHESIS OF LUMBAR REGION: ICD-10-CM

## 2025-01-31 DIAGNOSIS — M48.062 SPINAL STENOSIS, LUMBAR REGION, WITH NEUROGENIC CLAUDICATION: ICD-10-CM

## 2025-01-31 DIAGNOSIS — M48.062 SPINAL STENOSIS OF LUMBAR REGION WITH NEUROGENIC CLAUDICATION: Primary | ICD-10-CM

## 2025-01-31 DIAGNOSIS — R39.9 LOWER URINARY TRACT SYMPTOMS (LUTS): Primary | ICD-10-CM

## 2025-01-31 DIAGNOSIS — M48.062 LUMBAR STENOSIS WITH NEUROGENIC CLAUDICATION: ICD-10-CM

## 2025-01-31 DIAGNOSIS — N50.812 LEFT TESTICULAR PAIN: ICD-10-CM

## 2025-01-31 DIAGNOSIS — Z98.1 S/P LUMBAR FUSION: ICD-10-CM

## 2025-01-31 PROCEDURE — 99024 POSTOP FOLLOW-UP VISIT: CPT | Performed by: PHYSICIAN ASSISTANT

## 2025-01-31 RX ORDER — CIPROFLOXACIN 750 MG/1
750 TABLET, FILM COATED ORAL DAILY
Qty: 14 TABLET | Refills: 0 | Status: SHIPPED | OUTPATIENT
Start: 2025-01-31

## 2025-01-31 NOTE — TELEPHONE ENCOUNTER
"  Caller: Jaylan Moran \"Mikhail\"     Relationship: SELF    Best call back number: [phone]     What is the best time to reach you: ANYTIME    Who are you requesting to speak with (clinical staff, provider,  specific staff member): CLINICAL     Do you know the name of the person who called: INCOMING CALL    What was the call regarding: PT WAS CALLING ASKING IF DR PIERCE COULD SEE HIM TODAY. PT STATES FOR THE LAST 2 DAYS HE HAS SWELLING IN HIS SCROTAL VEINS, HE HAS A CONSTANT FEELING LIKE HE HAS BEEN KICKED IN HIS TESTICULAR AREA AND HAS NAUSEA. THIS FEELING IS CONSTANT.    PT IS AWARE DR PIERCE IS OUT TODAY, HE IS OPEN TO SEEING ANYONE. PLEASE GIVE PT A CALL BACK TO DISCUSS.    HE HAS AN APPT IN THE [facility] [facility] TODAY AT 12:30 AND CAN COME PRIOR TO THAT APPT OR AFTER IT.    Is it okay if the provider responds through [employer]: NO  "

## 2025-01-31 NOTE — PROGRESS NOTES
Subjective     Chief Complaint: low back and leg pain with walking and standing intolerance                                S/p lumbar fusion for instability    Patient ID: Jaylan Moran is a 54 y.o. male is here today for follow-up.    Post-op  Pain Control:  Well controlled  Fever:  No fever  Diet:  Adequate intake  Activity:  Returning to normal  Operative Site Issues: No    Post-op Follow-up  Pain Control:  Well controlled  Fever:  No fever  Diet:  Adequate intake  Activity:  Returning to normal  Operative Site Issues: No    Additional information:  Top of thigh on right leg is staying numb most of the time. This did not happen prior to surgery      History of Present Illness:  Mr Moran is a 54-year-old gentleman who several years ago underwent lumbar decompression at multiple levels. He had developed progressive back and lower extremity pain and has developed spondylolisthesis at the L2-3 site with recurrent stenosis. As such, the patient presented on 1/9/25 for redo decompression with fusion and stabilization.  Surgery was without complication and he was discharged to home on postoperative day 2.  Since surgery he has had very good relief of his preoperative claudication symptoms.  He has noted that prior to surgery he would have right leg numbness when he was up walking but that it went away when he sat down.  Now it is present all of the time but without any pain.  He has been walking regularly he would like to have some physical therapy.  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    Incidentally he notes that he has had 3 or 4 days of left-sided scrotal swelling with testicular tenderness and nausea.  He actually has an appointment later today with Dr. Calero to follow-up on this he did not feel that it had anything to do with surgery and did not occur shortly after his procedure  He denies fever, chills, or  problems with his incision    The following portions of the patient's history were reviewed and updated as appropriate: allergies, current medications, past family history, past medical history, past social history, past surgical history and problem list.    Family history:   Family History   Problem Relation Age of Onset   • Diabetes Mother    • Hypertension Mother    • COPD Mother    • Arthritis Mother    • Heart disease Mother    • Heart disease Brother    • COPD Brother    • Arthritis Brother    • COPD Father        Social history:   Social History     Socioeconomic History   • Marital status:    Tobacco Use   • Smoking status: Never     Passive exposure: Past   • Smokeless tobacco: Never   Vaping Use   • Vaping status: Never Used   Substance and Sexual Activity   • Alcohol use: Yes     Alcohol/week: 1.0 - 2.0 standard drink of alcohol     Types: 1 - 2 Cans of beer per week     Comment: occasionally   • Drug use: No   • Sexual activity: Yes     Partners: Female     Birth control/protection: None, Vasectomy     Comment: Had vasectomy       Review of Systems   Constitutional:  Negative for activity change, appetite change, chills, diaphoresis, fatigue, fever and unexpected weight change.   HENT:  Negative for congestion, dental problem, drooling, ear discharge, ear pain, facial swelling, hearing loss, mouth sores, nosebleeds, postnasal drip, rhinorrhea, sinus pressure, sinus pain, sneezing, sore throat, tinnitus, trouble swallowing and voice change.    Eyes:  Negative for photophobia, pain, discharge, redness, itching and visual disturbance.   Respiratory:  Negative for apnea, cough, choking, chest tightness, shortness of breath, wheezing and stridor.    Cardiovascular:  Negative for chest pain, palpitations and leg swelling.   Gastrointestinal:  Negative for abdominal distention, abdominal pain, anal bleeding, blood in stool, constipation, diarrhea, nausea, rectal pain and vomiting.   Endocrine: Negative  "for cold intolerance, heat intolerance, polydipsia, polyphagia and polyuria.   Genitourinary:  Negative for decreased urine volume, difficulty urinating, dysuria, enuresis, flank pain, frequency, genital sores, hematuria, penile discharge, penile pain, penile swelling, scrotal swelling, testicular pain and urgency.   Musculoskeletal:  Negative for arthralgias, back pain, gait problem, joint swelling, myalgias, neck pain and neck stiffness.   Skin:  Negative for color change, pallor, poor wound healing, rash and wound.   Allergic/Immunologic: Negative for environmental allergies, food allergies and immunocompromised state.   Neurological:  Negative for dizziness, tremors, seizures, syncope, facial asymmetry, speech difficulty, weakness, light-headedness, numbness and headaches.   Hematological:  Negative for adenopathy. Does not bruise/bleed easily.   Psychiatric/Behavioral:  Negative for agitation, behavioral problems, confusion, decreased concentration, dysphoric mood, hallucinations, self-injury, sleep disturbance and suicidal ideas. The patient is not nervous/anxious and is not hyperactive.        Objective   Temperature 97.7 °F (36.5 °C), temperature source Infrared, height 177.8 cm (70\"), weight 107 kg (235 lb).  Body mass index is 33.72 kg/m².    Physical Exam  Constitutional:       Appearance: Normal appearance.   Eyes:      Pupils: Pupils are equal, round, and reactive to light.   Cardiovascular:        Pulses: Normal pulses.   ____Pulmonary:      Effort: Pulmonary effort is normal.      Breath sounds: Normal breath sounds.   Musculoskeletal:      Comments: Gait slow and mildly antalgic, but steady and independent with no foot drop.    Skin:     Comments: Incision clean, dry, and intact with no swelling, tenderness, or erythema. There is a small area of eschar at the superior end of the incision and very mild superficial swelling of the upper soft tissues without erythema or tenderness, likely from a " superficial hematoma   _Neurological:      General: No focal deficit present.      Mental Status:   alert and oriented to person, place, and time.   Psychiatric:         Mood and Affect: Mood normal.         Behavior: Behavior normal.  Assessment & Plan     Independent Review of Radiographic Studies:    AP and lateral x-rays of the lumbar spine are available for review.  This shows good placement of the fusion hardware.  The x-rays were reviewed with the patient and the procedure explained.  The patient's questions were answered    Medical Decision Making:    Mr. Moran is doing well overall.  He still has some mild residual right sided numbness and tingling but he has had good improvement in his claudication symptoms and is walking much further now.  He may drive.  He may return to work no restrictions except half days for the first 2 weeks.  After this he may return full-time with no restrictions.  I will plan to see him back in approximately 3 weeks for another follow-up.     Diagnoses and all orders for this visit:    1. Spinal stenosis of lumbar region with neurogenic claudication (Primary)    2. Lumbar disc disease    3. Spondylolisthesis of lumbar region    4. Spinal stenosis, lumbar region, with neurogenic claudication    5. Lumbar stenosis with neurogenic claudication    6. S/P lumbar fusion      Return in about 3 weeks (around 2/21/2025).      This document signed by Zhane Ferreira PA-C  January 31, 2025 16:32 EST

## 2025-01-31 NOTE — PROGRESS NOTES
Follow Up Office Visit      Patient Name: Jaylan Moran  : 1970   MRN: [id number]     Chief Complaint:    Chief Complaint   Patient presents with    Testicle Pain    Groin Swelling    Nausea    Lower abdominal pain       Referring Provider: No ref. provider found    History of Present Illness: Jaylan Moran is a 54 y.o. male who presents today for left testicular pain. Patient had lumbar fusion decompression with Dr. Escobar on .     He reports left scrotal pain started on Tuesday. Pain is sharp and dull that intermittently radiates to left groin.He states his pain is made worse with certain sitting positions. He reports urinary urgency. Patient denies dysuria.       Physical examination performed.  No epididymal cyst, tenderness to palpation, no orchitis present. No spermatic cord tenderness or hernias present.     Patient reports a similar episode in  that resolved after a week.    Subjective      Review of System: Review of Systems   Genitourinary:  Positive for testicular pain and urgency.   All other systems reviewed and are negative.     I have reviewed the ROS documented by my clinical staff, I have updated appropriately and I agree. GIULIA Weaver    I have reviewed and the following portions of the patient's history were updated as appropriate: past family history, past medical history, past social history, past surgical history and problem list.    Medications:     Current Outpatient Medications:     ciprofloxacin (CIPRO) 750 MG tablet, Take 1 tablet by mouth Daily., Disp: 14 tablet, Rfl: 0    ibuprofen (ADVIL,MOTRIN) 600 MG tablet, Take 1 tablet by mouth Every 6 (Six) Hours As Needed for Moderate Pain., Disp: 90 tablet, Rfl: 0    Multiple Vitamins-Minerals (MULTIVITAL PO), Take 1 tablet by mouth Daily., Disp: , Rfl:     omeprazole (PriLOSEC) 40 MG capsule, Take 1 capsule by mouth As Needed (acid reflux)., Disp: , Rfl:   No current facility-administered  medications for this visit.    Facility-Administered Medications Ordered in Other Visits:     mupirocin (BACTROBAN) 2 % nasal ointment, , Nasal, BID, Zhane Ferreira PA-C    Allergies:   Allergies   Allergen Reactions    Penicillins Other (See Comments) and Unknown - Low Severity     SINCE INFANCY         Objective     Physical Exam:   Vital Signs:   Vitals:    01/31/25 1308   BP: 118/80   Pulse: 77   SpO2: 97%     There is no height or weight on file to calculate BMI.     Physical Exam  Vitals and nursing note reviewed.   Constitutional:       Appearance: Normal appearance.   Pulmonary:      Effort: Pulmonary effort is normal.   Genitourinary:     Comments: Tenderness to palpitation    Neurological:      Mental Status: He is alert and oriented to person, place, and time.   Psychiatric:         Mood and Affect: Mood normal.         Behavior: Behavior normal.         Labs:   Brief Urine Lab Results  (Last result in the past 365 days)        Color   Clarity   Blood   Leuk Est   Nitrite   Protein   CREAT   Urine HCG        01/31/25 1312 Yellow   Clear   Negative   Negative   Negative   Negative                        Lab Results   Component Value Date    GLUCOSE 79 12/20/2024    CALCIUM 9.1 12/20/2024     12/20/2024    K 4.6 12/20/2024    CO2 26.0 12/20/2024     12/20/2024    BUN 14 12/20/2024    CREATININE 0.89 12/20/2024    EGFRIFNONA 83 11/17/2021    BCR 15.7 12/20/2024    ANIONGAP 10.0 12/20/2024       Lab Results   Component Value Date    WBC 6.95 12/20/2024    HGB 12.3 (L) 01/10/2025    HCT 38.4 01/10/2025    MCV 87.4 12/20/2024     12/20/2024       Images:   XR Spine Lumbar AP & Lateral    Result Date: 1/31/2025  Impression: 1.Interval posterior fusion and discectomy at the L2/3 level where there is grade 1 spondylolisthesis. Electronically Signed: Kwaku Subramanian MD  1/31/2025 10:19 AM EST  Workstation ID: [id number]      Measures:   Tobacco:   Jaylan Crandall Dean  reports that he has never  smoked. He has been exposed to tobacco smoke. He has never used smokeless tobacco.          Urine Incontinence: Patient reports that he is not currently experiencing any symptoms of urinary incontinence.       Assessment / Plan      Assessment/Plan:   54 y.o. male who presented today for left testicular pain.    Urinalysis negative for infection or blood. Given left testicular and tender to palpate we discussed a course of antibiotic and obtain scrotum and testicle ultrasound. Patient is understanding and agreeable with plan of care.    Diagnoses and all orders for this visit:    1. Lower urinary tract symptoms (LUTS) (Primary)  -     POC Urinalysis Dipstick, Automated    2. Left testicular pain  -     ciprofloxacin (CIPRO) 750 MG tablet; Take 1 tablet by mouth Daily.  Dispense: 14 tablet; Refill: 0  -     US Scrotum & Testicles; Future           Follow Up:   Return With Dr. Calero.    I spent approximately 30 minutes providing clinical care for this patient; including review of patient's chart and provider documentation, face to face time spent with patient in examination room (obtaining history, performing physical exam, discussing diagnosis and management options), placing orders, and completing patient documentation.       GIULIA Weaver  [facility] [place]

## 2025-02-04 ENCOUNTER — TELEPHONE (OUTPATIENT)
Dept: UROLOGY | Facility: CLINIC | Age: 55
End: 2025-02-04
Payer: COMMERCIAL

## 2025-02-04 NOTE — TELEPHONE ENCOUNTER
Called pt and told him he's okay to call central scheduling to set up US. Will give pt a call back tomorrow regarding abx once I've heard back from Sunita. Pt verbalized understanding.

## 2025-02-04 NOTE — TELEPHONE ENCOUNTER
Pt called with questions about cipro he's been taking. Pt states instructions on package he received from pharmacy states to take 2 pills daily for 7 days. Today he noticed the bottle says to take 1 pill daily for 14 days. Pt is on day 5 and wonders if this is okay. He states the testicular pain is still the same regardless and is wondering if he should try something else. Please advise.

## 2025-02-13 ENCOUNTER — HOSPITAL ENCOUNTER (OUTPATIENT)
Dept: ULTRASOUND IMAGING | Facility: HOSPITAL | Age: 55
Discharge: HOME | End: 2025-02-13
Payer: COMMERCIAL

## 2025-02-13 DIAGNOSIS — N50.812 LEFT TESTICULAR PAIN: ICD-10-CM

## 2025-02-13 PROCEDURE — 93976 VASCULAR STUDY: CPT

## 2025-02-13 PROCEDURE — 76870 US EXAM SCROTUM: CPT

## 2025-02-20 ENCOUNTER — OFFICE VISIT (OUTPATIENT)
Dept: UROLOGY | Facility: CLINIC | Age: 55
End: 2025-02-20
Payer: COMMERCIAL

## 2025-02-20 VITALS — SYSTOLIC BLOOD PRESSURE: 128 MMHG | OXYGEN SATURATION: 97 % | HEART RATE: 83 BPM | DIASTOLIC BLOOD PRESSURE: 84 MMHG

## 2025-02-20 DIAGNOSIS — R10.32 LEFT INGUINAL PAIN: Primary | ICD-10-CM

## 2025-02-20 DIAGNOSIS — N13.8 BPH WITH OBSTRUCTION/LOWER URINARY TRACT SYMPTOMS: ICD-10-CM

## 2025-02-20 DIAGNOSIS — N40.1 BPH WITH OBSTRUCTION/LOWER URINARY TRACT SYMPTOMS: ICD-10-CM

## 2025-02-21 ENCOUNTER — HOSPITAL ENCOUNTER (OUTPATIENT)
Facility: HOSPITAL | Age: 55
Discharge: HOME | End: 2025-02-21
Admitting: STUDENT IN AN ORGANIZED HEALTH CARE EDUCATION/TRAINING PROGRAM
Payer: COMMERCIAL

## 2025-02-21 ENCOUNTER — TELEPHONE (OUTPATIENT)
Dept: UROLOGY | Facility: CLINIC | Age: 55
End: 2025-02-21
Payer: COMMERCIAL

## 2025-02-21 DIAGNOSIS — R10.32 LEFT INGUINAL PAIN: ICD-10-CM

## 2025-02-21 PROCEDURE — 74176 CT ABD & PELVIS W/O CONTRAST: CPT

## 2025-02-21 NOTE — TELEPHONE ENCOUNTER
Patient called again asking about his CT results- he wanted to see if  would be able to call him today since he is still having pain. I let him know that  has been in surgery all day so I can't promise that he will be able to call him today but I will send this over to him and let him know.

## 2025-02-21 NOTE — TELEPHONE ENCOUNTER
Caller: STANTON YOUNGBLOOD    Relationship: SELF    Best call back number: [phone]    Caller requesting test results: PATIENT    What test was performed: CT ABD AND PELVIS    When was the test performed: THIS MORNING 02-21-25    Where was the test performed:      Additional notes: PATIENT WOULD LIKE RESULTS RELEASED OR A CALL WITH THOSE RESULTS.

## 2025-02-22 NOTE — PROGRESS NOTES
CT demonstrates no abnormalities, no stones, no hydronephrosis, no inguinal hernia or other urologic concerns. [employer] message sent to patient.

## 2025-02-24 NOTE — PROGRESS NOTES
Please let Mikhail know his guidance PCR urine culture demonstrates no bacteria, no obvious UTI good news.

## 2025-02-25 ENCOUNTER — OFFICE VISIT (OUTPATIENT)
Dept: NEUROSURGERY | Facility: CLINIC | Age: 55
End: 2025-02-25
Payer: COMMERCIAL

## 2025-02-25 VITALS
TEMPERATURE: 97.7 F | SYSTOLIC BLOOD PRESSURE: 138 MMHG | HEIGHT: 70 IN | WEIGHT: 242.2 LBS | DIASTOLIC BLOOD PRESSURE: 100 MMHG | BODY MASS INDEX: 34.67 KG/M2

## 2025-02-25 DIAGNOSIS — R20.0 RIGHT LEG NUMBNESS: ICD-10-CM

## 2025-02-25 DIAGNOSIS — M48.062 SPINAL STENOSIS, LUMBAR REGION, WITH NEUROGENIC CLAUDICATION: Primary | ICD-10-CM

## 2025-02-25 DIAGNOSIS — Z98.1 S/P LUMBAR FUSION: ICD-10-CM

## 2025-02-25 DIAGNOSIS — N50.812 LEFT TESTICULAR PAIN: ICD-10-CM

## 2025-02-25 PROCEDURE — 99024 POSTOP FOLLOW-UP VISIT: CPT | Performed by: PHYSICIAN ASSISTANT

## 2025-02-25 NOTE — PROGRESS NOTES
Subjective     Chief Complaint: back and leg pain with walking and standing intolerance                              S/p lumbar fusion for instability                               New left groin pain                               New right leg numbness and bilateral foot numbness     Patient ID: Jaylan Moran is a 54 y.o. male is here today for follow-up.    Post-op Follow-up  Pain Control:  Partially controlled  Fever:  No fever  Diet:  Adequate intake  Activity:  Moderately limited  Operative Site Issues: No    Additional information:  Having pain in lower abdominal left side. Seen Urologists and had ultra sound and CT scan done.    Mr Moran is a 54-year-old gentleman who several years ago underwent lumbar decompression at multiple levels. He had developed progressive back and lower extremity pain and has developed spondylolisthesis at the L2-3 site with recurrent stenosis. As such, the patient presented on 1/9/25 for redo decompression with fusion and stabilization.  Surgery was without complication and he was discharged to home on postoperative day 2.  Since surgery he has had very good relief of his preoperative claudication symptoms.  However, he has noted that prior to surgery he would have right leg numbness when he was up walking but that it went away when he sat down.  Now it is present all of the time but without any pain. Additionally, since he was last seen on 1/31/25, he has numbness on the top of his right foot and bottom of his left foot. This numbness is new.   When he was seen in the office for his postop visit on 1/31, he had complaints of a 3-4day history of left groin and testicular pain. He has had orchitis before which was treated successfully with antibiotics and this pain was similar, but did not improve with antibiotics. He saw Dr Calero who ruled out UTI,  stones, hydronephrosis, inguinal hernia or other urologic concerns. The left inguinal pain has persisted. It is  present all the time, but improved if he sits up straight in a chair with lumbar support, when he bends, reclines, or bears down it is much worse and very bothersome . He denies saddle anesthesia. He has had slow urine flow but no incontinence. He has had constipation which was relieved over the weekend with no change in his groin and testicular symptoms.     The following portions of the patient's history were reviewed and updated as appropriate: allergies, current medications, past family history, past medical history, past social history, past surgical history and problem list.    Family history:   Family History   Problem Relation Age of Onset    Diabetes Mother     Hypertension Mother     COPD Mother     Arthritis Mother     Heart disease Mother     Heart disease Brother     COPD Brother     Arthritis Brother     COPD Father        Social history:   Social History     Socioeconomic History    Marital status:    Tobacco Use    Smoking status: Never     Passive exposure: Past    Smokeless tobacco: Never   Vaping Use    Vaping status: Never Used   Substance and Sexual Activity    Alcohol use: Yes     Alcohol/week: 1.0 - 2.0 standard drink of alcohol     Types: 1 - 2 Cans of beer per week     Comment: occasionally    Drug use: No    Sexual activity: Yes     Partners: Female     Birth control/protection: None, Vasectomy     Comment: Had vasectomy       Review of Systems   Constitutional:  Negative for activity change, appetite change, chills, diaphoresis, fatigue, fever and unexpected weight change.   HENT:  Negative for congestion, dental problem, drooling, ear discharge, ear pain, facial swelling, hearing loss, mouth sores, nosebleeds, postnasal drip, rhinorrhea, sinus pressure, sinus pain, sneezing, sore throat, tinnitus, trouble swallowing and voice change.    Eyes:  Negative for photophobia, pain, discharge, redness, itching and visual disturbance.   Respiratory:  Negative for apnea, cough, choking,  "chest tightness, shortness of breath, wheezing and stridor.    Cardiovascular:  Negative for chest pain, palpitations and leg swelling.   Gastrointestinal:  Positive for abdominal pain (lower left). Negative for abdominal distention, anal bleeding, blood in stool, constipation, diarrhea, nausea, rectal pain and vomiting.   Endocrine: Negative for cold intolerance, heat intolerance, polydipsia, polyphagia and polyuria.   Genitourinary:  Negative for decreased urine volume, difficulty urinating, dysuria, enuresis, flank pain, frequency, genital sores, hematuria and urgency.   Musculoskeletal:  Positive for back pain. Negative for arthralgias, gait problem, joint swelling, myalgias, neck pain and neck stiffness.   Skin:  Negative for color change, pallor, poor wound healing, rash and wound.   Allergic/Immunologic: Negative for environmental allergies, food allergies and immunocompromised state.   Neurological:  Negative for dizziness, tremors, seizures, syncope, facial asymmetry, speech difficulty, weakness, light-headedness, numbness and headaches.   Hematological:  Negative for adenopathy. Does not bruise/bleed easily.   Psychiatric/Behavioral:  Negative for agitation, behavioral problems, confusion, decreased concentration, dysphoric mood, hallucinations, self-injury, sleep disturbance and suicidal ideas. The patient is not nervous/anxious and is not hyperactive.        Objective   Blood pressure 138/100, temperature 97.7 °F (36.5 °C), temperature source Infrared, height 177.8 cm (70\"), weight 110 kg (242 lb 3.2 oz).  Body mass index is 34.75 kg/m².    Physical Exam  Constitutional:       Appearance: Normal appearance.   Eyes:      Pupils: Pupils are equal, round, and reactive to light.   Cardiovascular:      Pulses: Normal pulses.   ____Pulmonary:      Effort: Pulmonary effort is normal.      Breath sounds: Normal breath sounds.   Musculoskeletal:      Comments: Gait steady and independent with no foot drop.  "   Skin:     Comments: Incision clean, dry, and intact with no swelling, tenderness, or erythema.    _Neurological:      General: No focal deficit present.      Mental Status:   alert and oriented to person, place, and time.   Psychiatric:         Mood and Affect: Mood normal.         Behavior: Behavior normal.    Assessment & Plan   Postop xrays done on 1/28 had shown normal postop changes. Full urologic workup did not reveal a cause for his new left inguinal pain nor his right leg numbness or foot numbness. I will obtain a CT of the lumbar  spine that will let us assess the hardware and soft tissues. He may need a repeat myelogram depending on these results. I will try to get this done promptly and see him back in the office.     Diagnoses and all orders for this visit:    1. Spinal stenosis, lumbar region, with neurogenic claudication (Primary)  -     CT Lumbar Spine Without Contrast; Future    2. S/P lumbar fusion  -     CT Lumbar Spine Without Contrast; Future    3. Left testicular pain  -     CT Lumbar Spine Without Contrast; Future    4. Right leg numbness  -     CT Lumbar Spine Without Contrast; Future        Return in about 3 days (around 2/28/2025).       This document signed by Zhane Ferreira PA-C  February 25, 2025 13:42 EST

## 2025-02-28 ENCOUNTER — OFFICE VISIT (OUTPATIENT)
Dept: NEUROSURGERY | Facility: CLINIC | Age: 55
End: 2025-02-28
Payer: COMMERCIAL

## 2025-02-28 ENCOUNTER — HOSPITAL ENCOUNTER (OUTPATIENT)
Dept: CT IMAGING | Facility: HOSPITAL | Age: 55
Discharge: HOME | End: 2025-02-28
Admitting: PHYSICIAN ASSISTANT
Payer: COMMERCIAL

## 2025-02-28 VITALS
HEIGHT: 70 IN | WEIGHT: 242 LBS | BODY MASS INDEX: 34.65 KG/M2 | DIASTOLIC BLOOD PRESSURE: 80 MMHG | SYSTOLIC BLOOD PRESSURE: 120 MMHG | TEMPERATURE: 97.3 F

## 2025-02-28 DIAGNOSIS — N50.812 LEFT TESTICULAR PAIN: ICD-10-CM

## 2025-02-28 DIAGNOSIS — M48.062 SPINAL STENOSIS, LUMBAR REGION, WITH NEUROGENIC CLAUDICATION: ICD-10-CM

## 2025-02-28 DIAGNOSIS — R20.0 RIGHT LEG NUMBNESS: ICD-10-CM

## 2025-02-28 DIAGNOSIS — M48.062 SPINAL STENOSIS, LUMBAR REGION, WITH NEUROGENIC CLAUDICATION: Primary | ICD-10-CM

## 2025-02-28 DIAGNOSIS — Z98.1 S/P LUMBAR FUSION: ICD-10-CM

## 2025-02-28 PROCEDURE — 99024 POSTOP FOLLOW-UP VISIT: CPT | Performed by: PHYSICIAN ASSISTANT

## 2025-02-28 PROCEDURE — 72131 CT LUMBAR SPINE W/O DYE: CPT

## 2025-02-28 NOTE — Clinical Note
Mr Moran needs an MRI. He has claustrophobia and [facility]. I've placed the orders if you can get him scheduled, please. He can see me or Dr. Escobar afterward.

## 2025-02-28 NOTE — PROGRESS NOTES
Subjective     Chief Complaint: back and leg pain with walking and standing intolerance                              S/p lumbar fusion for instability                               New left groin pain                               New right leg numbness and bilateral foot numbness     Patient ID: Jaylan Moran is a 54 y.o. male is here today for follow-up.    Post-op  Back Pain  Associated symptoms include abdominal pain. Pertinent negatives include no chest pain, dysuria, fever, headaches, numbness or weakness.   Abdominal Pain  Pertinent negatives include no arthralgias, constipation, diarrhea, dysuria, fever, frequency, headaches, hematuria, myalgias, nausea or vomiting.         History of Present Illness:  Mr Moran is a 54-year-old gentleman who is well known to our service: right CTR 2016, C4-7 ACDF 2017, L2-5 laminectomy 2021 and then last year he developed progressive back and lower extremity pain due to spondylolisthesis at the L2-3 site with recurrent stenosis. As such, the underwent redo decompression and fusion at this level on 1/9/25    Surgery was without complication and he was discharged to home on postoperative day 2.  When he was seen in the office for his postop visit on 1/31, he had complaints of a 3-4day history of left groin and testicular pain. He has had orchitis before which was treated successfully with antibiotics and this pain was similar, but did not improve with antibiotics. He saw Dr Calero who ruled out UTI,  stones, hydronephrosis, inguinal hernia or other urologic concerns. The left inguinal pain has persisted. It is present all the time, but improved if he sits up straight in a chair with lumbar support, when he bends, reclines, or bears down it is much worse and very bothersome . He denies saddle anesthesia. He has had slow urine flow but no incontinence. He has had constipation which was relieved over the weekend with no change in his groin and testicular symptoms.  This groin pain has become very bothersome and is present all the time. It is very limiting. He is here today to review a CT of the lumbar spine to examine the fusion construct.     The following portions of the patient's history were reviewed and updated as appropriate: allergies, current medications, past family history, past medical history, past social history, past surgical history and problem list.    Family history:   Family History   Problem Relation Age of Onset    Diabetes Mother     Hypertension Mother     COPD Mother     Arthritis Mother     Heart disease Mother     Heart disease Brother     COPD Brother     Arthritis Brother     COPD Father        Social history:   Social History     Socioeconomic History    Marital status:    Tobacco Use    Smoking status: Never     Passive exposure: Never    Smokeless tobacco: Never   Vaping Use    Vaping status: Never Used   Substance and Sexual Activity    Alcohol use: Yes     Alcohol/week: 1.0 - 2.0 standard drink of alcohol     Types: 1 - 2 Cans of beer per week     Comment: occasionally    Drug use: No    Sexual activity: Yes     Partners: Female     Birth control/protection: None, Vasectomy     Comment: Had vasectomy       Review of Systems   Constitutional:  Negative for activity change, appetite change, chills, diaphoresis, fatigue, fever and unexpected weight change.   HENT:  Negative for congestion, dental problem, drooling, ear discharge, ear pain, facial swelling, hearing loss, mouth sores, nosebleeds, postnasal drip, rhinorrhea, sinus pressure, sinus pain, sneezing, sore throat, tinnitus, trouble swallowing and voice change.    Eyes:  Negative for photophobia, pain, discharge, redness, itching and visual disturbance.   Respiratory:  Negative for apnea, cough, choking, chest tightness, shortness of breath, wheezing and stridor.    Cardiovascular:  Negative for chest pain, palpitations and leg swelling.   Gastrointestinal:  Positive for abdominal  "pain. Negative for abdominal distention, anal bleeding, blood in stool, constipation, diarrhea, nausea, rectal pain and vomiting.   Endocrine: Negative for cold intolerance, heat intolerance, polydipsia, polyphagia and polyuria.   Genitourinary:  Positive for testicular pain. Negative for decreased urine volume, difficulty urinating, dysuria, enuresis, flank pain, frequency, genital sores, hematuria, penile discharge, penile pain, penile swelling, scrotal swelling and urgency.   Musculoskeletal:  Positive for back pain. Negative for arthralgias, gait problem, joint swelling, myalgias, neck pain and neck stiffness.   Skin:  Negative for color change, pallor, rash and wound.   Allergic/Immunologic: Negative for environmental allergies, food allergies and immunocompromised state.   Neurological:  Negative for dizziness, tremors, seizures, syncope, facial asymmetry, speech difficulty, weakness, light-headedness, numbness and headaches.   Hematological:  Negative for adenopathy. Does not bruise/bleed easily.   Psychiatric/Behavioral:  Negative for agitation, behavioral problems, confusion, decreased concentration, dysphoric mood, hallucinations, self-injury, sleep disturbance and suicidal ideas. The patient is not nervous/anxious and is not hyperactive.        Objective   Blood pressure 120/80, temperature 97.3 °F (36.3 °C), temperature source Infrared, height 177.8 cm (70\"), weight 110 kg (242 lb).  Body mass index is 34.72 kg/m².    Physical Exam  Constitutional:       Appearance: Normal appearance.   Eyes:      Pupils: Pupils are equal, round, and reactive to light.   Cardiovascular:         Pulses: Normal pulses.   ____Pulmonary:      Effort: Pulmonary effort is normal.      Breath sounds: Normal breath sounds.   Musculoskeletal:      Comments: Gait slow and mildly antalgic, but steady and independent with no foot drop.    Skin:     Comments: Incision clean, dry, and intact with no swelling, tenderness, or erythema. " There is a small area of eschar at the superior end of the incision and very mild superficial swelling of the upper soft tissues without erythema or tenderness, likely from a superficial hematoma   _Neurological:      General: No focal deficit present.      Mental Status:   alert and oriented to person, place, and time.   Psychiatric:         Mood and Affect: Mood normal.         Behavior: Behavior normal.'    Assessment & Plan     Independent Review of Radiographic Studies:    Ct of the lumbar spine without contrast dated 2/25/25 was reviewed with the patient and also with Dr. Escobar. This shows overall straightening of the spine as previously noted, good hardware construct at L2/3 with proper placement of the interbodies, screws, and rods. There is no obvious soft tissue swelling or new spondylosis     Medical Decision Making:      Mr Moran continues to struggle. Dr. Escobar would like a new MRI of the lumbar spine with and without contrast. We will see him back subsequently.     Diagnoses and all orders for this visit:    1. Spinal stenosis, lumbar region, with neurogenic claudication (Primary)    2. S/P lumbar fusion    3. Left testicular pain        Return in about 2 weeks (around 3/14/2025).           This document signed by Zhane Ferreira PA-C  February 28, 2025 14:30 EST

## 2025-03-02 ENCOUNTER — HOSPITAL ENCOUNTER (EMERGENCY)
Facility: HOSPITAL | Age: 55
Discharge: HOME | End: 2025-03-02
Attending: EMERGENCY MEDICINE | Admitting: EMERGENCY MEDICINE
Payer: COMMERCIAL

## 2025-03-02 ENCOUNTER — APPOINTMENT (OUTPATIENT)
Dept: MRI IMAGING | Facility: HOSPITAL | Age: 55
End: 2025-03-02
Payer: COMMERCIAL

## 2025-03-02 VITALS
HEIGHT: 70 IN | RESPIRATION RATE: 16 BRPM | DIASTOLIC BLOOD PRESSURE: 79 MMHG | SYSTOLIC BLOOD PRESSURE: 131 MMHG | BODY MASS INDEX: 34.36 KG/M2 | OXYGEN SATURATION: 99 % | HEART RATE: 91 BPM | WEIGHT: 240 LBS | TEMPERATURE: 99.4 F

## 2025-03-02 DIAGNOSIS — R10.32 LEFT INGUINAL PAIN: ICD-10-CM

## 2025-03-02 DIAGNOSIS — Z98.1 S/P LUMBAR FUSION: ICD-10-CM

## 2025-03-02 DIAGNOSIS — M51.360 DEGENERATION OF INTERVERTEBRAL DISC OF LUMBAR REGION WITH DISCOGENIC BACK PAIN: ICD-10-CM

## 2025-03-02 DIAGNOSIS — M54.50 ACUTE LEFT-SIDED LOW BACK PAIN WITHOUT SCIATICA: Primary | ICD-10-CM

## 2025-03-02 LAB
ALBUMIN SERPL-MCNC: 4 G/DL (ref 3.5–5.2)
ALBUMIN/GLOB SERPL: 1.1 G/DL
ALP SERPL-CCNC: 103 U/L (ref 39–117)
ALT SERPL W P-5'-P-CCNC: 9 U/L (ref 1–41)
ANION GAP SERPL CALCULATED.3IONS-SCNC: 13 MMOL/L (ref 5–15)
AST SERPL-CCNC: 14 U/L (ref 1–40)
BASOPHILS # BLD AUTO: 0.04 10*3/MM3 (ref 0–0.2)
BASOPHILS NFR BLD AUTO: 0.4 % (ref 0–1.5)
BILIRUB SERPL-MCNC: <0.2 MG/DL (ref 0–1.2)
BILIRUB UR QL STRIP: NEGATIVE
BUN SERPL-MCNC: 13 MG/DL (ref 6–20)
BUN/CREAT SERPL: 16.3 (ref 7–25)
CALCIUM SPEC-SCNC: 9 MG/DL (ref 8.6–10.5)
CHLORIDE SERPL-SCNC: 102 MMOL/L (ref 98–107)
CLARITY UR: CLEAR
CO2 SERPL-SCNC: 25 MMOL/L (ref 22–29)
COLOR UR: YELLOW
CREAT SERPL-MCNC: 0.8 MG/DL (ref 0.76–1.27)
D-LACTATE SERPL-SCNC: 1.1 MMOL/L (ref 0.5–2)
DEPRECATED RDW RBC AUTO: 40.6 FL (ref 37–54)
EGFRCR SERPLBLD CKD-EPI 2021: 104.5 ML/MIN/1.73
EOSINOPHIL # BLD AUTO: 0.23 10*3/MM3 (ref 0–0.4)
EOSINOPHIL NFR BLD AUTO: 2.5 % (ref 0.3–6.2)
ERYTHROCYTE [DISTWIDTH] IN BLOOD BY AUTOMATED COUNT: 13.2 % (ref 12.3–15.4)
GLOBULIN UR ELPH-MCNC: 3.5 GM/DL
GLUCOSE SERPL-MCNC: 99 MG/DL (ref 65–99)
GLUCOSE UR STRIP-MCNC: NEGATIVE MG/DL
HCT VFR BLD AUTO: 33.4 % (ref 37.5–51)
HGB BLD-MCNC: 10.5 G/DL (ref 13–17.7)
HGB UR QL STRIP.AUTO: NEGATIVE
HOLD SPECIMEN: NORMAL
IMM GRANULOCYTES # BLD AUTO: 0.02 10*3/MM3 (ref 0–0.05)
IMM GRANULOCYTES NFR BLD AUTO: 0.2 % (ref 0–0.5)
KETONES UR QL STRIP: NEGATIVE
LEUKOCYTE ESTERASE UR QL STRIP.AUTO: NEGATIVE
LIPASE SERPL-CCNC: 25 U/L (ref 13–60)
LYMPHOCYTES # BLD AUTO: 1.13 10*3/MM3 (ref 0.7–3.1)
LYMPHOCYTES NFR BLD AUTO: 12.4 % (ref 19.6–45.3)
MCH RBC QN AUTO: 26.5 PG (ref 26.6–33)
MCHC RBC AUTO-ENTMCNC: 31.4 G/DL (ref 31.5–35.7)
MCV RBC AUTO: 84.3 FL (ref 79–97)
MONOCYTES # BLD AUTO: 1.17 10*3/MM3 (ref 0.1–0.9)
MONOCYTES NFR BLD AUTO: 12.8 % (ref 5–12)
NEUTROPHILS NFR BLD AUTO: 6.54 10*3/MM3 (ref 1.7–7)
NEUTROPHILS NFR BLD AUTO: 71.7 % (ref 42.7–76)
NITRITE UR QL STRIP: NEGATIVE
NRBC BLD AUTO-RTO: 0 /100 WBC (ref 0–0.2)
PH UR STRIP.AUTO: 5.5 [PH] (ref 5–8)
PLATELET # BLD AUTO: 310 10*3/MM3 (ref 140–450)
PMV BLD AUTO: 9.9 FL (ref 6–12)
POTASSIUM SERPL-SCNC: 4 MMOL/L (ref 3.5–5.2)
PROT SERPL-MCNC: 7.5 G/DL (ref 6–8.5)
PROT UR QL STRIP: NEGATIVE
RBC # BLD AUTO: 3.96 10*6/MM3 (ref 4.14–5.8)
SODIUM SERPL-SCNC: 140 MMOL/L (ref 136–145)
SP GR UR STRIP: 1.01 (ref 1–1.03)
UROBILINOGEN UR QL STRIP: NORMAL
WBC NRBC COR # BLD AUTO: 9.13 10*3/MM3 (ref 3.4–10.8)
WHOLE BLOOD HOLD COAG: NORMAL
WHOLE BLOOD HOLD SPECIMEN: NORMAL

## 2025-03-02 PROCEDURE — 80053 COMPREHEN METABOLIC PANEL: CPT | Performed by: EMERGENCY MEDICINE

## 2025-03-02 PROCEDURE — 83690 ASSAY OF LIPASE: CPT | Performed by: EMERGENCY MEDICINE

## 2025-03-02 PROCEDURE — 83605 ASSAY OF LACTIC ACID: CPT | Performed by: EMERGENCY MEDICINE

## 2025-03-02 PROCEDURE — 72158 MRI LUMBAR SPINE W/O & W/DYE: CPT

## 2025-03-02 PROCEDURE — A9577 INJ MULTIHANCE: HCPCS | Performed by: EMERGENCY MEDICINE

## 2025-03-02 PROCEDURE — 96374 THER/PROPH/DIAG INJ IV PUSH: CPT

## 2025-03-02 PROCEDURE — 81003 URINALYSIS AUTO W/O SCOPE: CPT | Performed by: EMERGENCY MEDICINE

## 2025-03-02 PROCEDURE — 99285 EMERGENCY DEPT VISIT HI MDM: CPT

## 2025-03-02 PROCEDURE — 85025 COMPLETE CBC W/AUTO DIFF WBC: CPT | Performed by: EMERGENCY MEDICINE

## 2025-03-02 PROCEDURE — 25510000002 GADOBENATE DIMEGLUMINE 529 MG/ML SOLUTION: Performed by: EMERGENCY MEDICINE

## 2025-03-02 PROCEDURE — 25010000002 DIAZEPAM PER 5 MG: Performed by: EMERGENCY MEDICINE

## 2025-03-02 RX ORDER — DIAZEPAM 10 MG/2ML
2.5 INJECTION, SOLUTION INTRAMUSCULAR; INTRAVENOUS ONCE
Status: COMPLETED | OUTPATIENT
Start: 2025-03-02 | End: 2025-03-02

## 2025-03-02 RX ORDER — SODIUM CHLORIDE 9 MG/ML
10 INJECTION, SOLUTION INTRAMUSCULAR; INTRAVENOUS; SUBCUTANEOUS AS NEEDED
Status: DISCONTINUED | OUTPATIENT
Start: 2025-03-02 | End: 2025-03-02 | Stop reason: HOSPADM

## 2025-03-02 RX ADMIN — GADOBENATE DIMEGLUMINE 20 ML: 529 INJECTION, SOLUTION INTRAVENOUS at 18:40

## 2025-03-02 RX ADMIN — DIAZEPAM 2.5 MG: 10 INJECTION, SOLUTION INTRAMUSCULAR; INTRAVENOUS at 18:12

## 2025-03-02 NOTE — ED PROVIDER NOTES
[facility]    EMERGENCY DEPARTMENT ENCOUNTER      Pt Name: Jaylan Moran  MRN: [id number]  YOB: 1970  Date of evaluation: 3/2/2025  Provider: Ace Mast DO    CHIEF COMPLAINT       Chief Complaint   Patient presents with    Abdominal Pain       HPI  Stated Reason for Visit: Pt arrives to ED with c/o LLQ pain that radiates to back. Pt had L2-3 fusion on 1/9/25. Pt has had CT and ultrasound to assess problem. History Obtained From: patient       HISTORY OF PRESENT ILLNESS  (Location/Symptom, Timing/Onset, Context/Setting, Quality, Duration, Modifying Factors, Severity.)   Jaylan Moran is a 54 y.o. male who presents to the emergency department with his wife with a concern for continued left lower quadrant abdominal pain with radiation towards the left inguinal/scrotal region, now rating towards his left lower back.  The patient states he has a significant history over the last few weeks as he has recurrent lower back pain, underwent L3 spinal fusion on January 9 with Dr. Escobar.  Has had some intermittent pain in the left lower abdomen, inguinal/groin region is followed with urologist Dr. Calero, and has had ultrasound and CT scan images which have been relatively unremarkable, treated for possible orchitis, but states really has not had a other issues.  Is not had any fevers, no fall, injury or trauma.  Now the pain is in his left lower lumbar spine.  Denies any saddle anesthesia or loss of bowel or bladder function.  He has been battling with constipation recently.  Taking Tylenol, Motrin intermittently with limited relief.  Denies any chest pain or difficulty breathing, no fever or chills, he denies any other acute systemic complaints at this time.      Nursing notes were reviewed.      PAST MEDICAL HISTORY     Past Medical History:   Diagnosis Date    Arthritis     Arthritis of back 2010    Arthritis of neck 2016    Had surgery    Back problem     Carpal tunnel syndrome      RIGHT    Cervical disc disorder 2021    Had surgery    Claustrophobia     GERD (gastroesophageal reflux disease)     History of anemia     Low back pain     Low back strain 2010    Lumbosacral disc disease 2010    Neck strain 2016    Sleep apnea     mouth guard    Thoracic disc disorder     Wears glasses          SURGICAL HISTORY       Past Surgical History:   Procedure Laterality Date    ANTERIOR CERVICAL DISCECTOMY W/ FUSION N/A 05/18/2017    Procedure: CERVICAL DISCECTOMY ANTERIOR WITH FUSION C4-7;  Surgeon: Morgan Escobar MD;  Location:  [facility];  Service:     BACK SURGERY  02/08/2021    laminectomy    CARPAL TUNNEL RELEASE Right 09/01/2016    Procedure: RIGHT CARPAL TUNNEL RELEASE;  Surgeon: Morgan Escobar MD;  Location:  [facility];  Service:     COLONOSCOPY      CYSTOSCOPY TRANSURETHRAL RESECTION OF PROSTATE N/A 11/19/2021    Procedure: CYSTOSCOPY, THULIUM LASER ABLATION OF PROSTATE;  Surgeon: Abdelrahman Calero MD;  Location:  [facility];  Service: Urology;  Laterality: N/A;    ENDOSCOPY      KNEE ARTHROSCOPY Right 11/22/2024    partially torn meniscus    LUMBAR LAMINECTOMY DISCECTOMY DECOMPRESSION N/A 02/11/2021    Procedure: LUMBAR LAMINECTOMY DISCECTOMY DECOMPRESSION POSTERIOR L2-5;  Surgeon: Morgan Escobar MD;  Location:  [facility];  Service: Neurosurgery;  Laterality: N/A;    LUMBAR LAMINECTOMY WITH FUSION N/A 01/09/2025    Procedure: LUMBAR FUSION DECOMPRESSON WITH PEDICLE SCREWS L2-3;  Surgeon: Morgan Escobar MD;  Location:  [facility];  Service: Neurosurgery;  Laterality: N/A;    NASAL SEPTUM SURGERY      NECK SURGERY  5/2016    OTHER SURGICAL HISTORY  12/11/2023    L2-5 Intracept procedure- Dr. Hai Botello    SHOULDER SURGERY Right 12/10/2021    shoulder arthroscopy with RCR, biceps tenodesis - JRT    VASECTOMY      Dont remember actual date.         CURRENT MEDICATIONS       Current Facility-Administered Medications:     Sodium Chloride (PF) 0.9 % 10 mL, 10 mL, Intravenous, PRN, Pacitti,  Ace Smyth, DO    Current Outpatient Medications:     Fluorouracil 4 % cream, APPLY TO AFFECTED AREAS OF FACE TWICE DAILY FOR 5 DAYS, Disp: , Rfl:     ibuprofen (ADVIL,MOTRIN) 600 MG tablet, Take 1 tablet by mouth Every 6 (Six) Hours As Needed for Moderate Pain., Disp: 90 tablet, Rfl: 0    Multiple Vitamins-Minerals (MULTIVITAL PO), Take 1 tablet by mouth Daily., Disp: , Rfl:     omeprazole (PriLOSEC) 40 MG capsule, Take 1 capsule by mouth As Needed (acid reflux)., Disp: , Rfl:     Facility-Administered Medications Ordered in Other Encounters:     mupirocin (BACTROBAN) 2 % nasal ointment, , Nasal, BID, Zhane Ferreira PA-C    ALLERGIES     Penicillins    FAMILY HISTORY       Family History   Problem Relation Age of Onset    Diabetes Mother     Hypertension Mother     COPD Mother     Arthritis Mother     Heart disease Mother     Heart disease Brother     COPD Brother     Arthritis Brother     COPD Father           SOCIAL HISTORY       Social History     Socioeconomic History    Marital status:    Tobacco Use    Smoking status: Never     Passive exposure: Never    Smokeless tobacco: Never   Vaping Use    Vaping status: Never Used   Substance and Sexual Activity    Alcohol use: Yes     Alcohol/week: 1.0 - 2.0 standard drink of alcohol     Types: 1 - 2 Cans of beer per week     Comment: occasionally    Drug use: No    Sexual activity: Yes     Partners: Female     Birth control/protection: None, Vasectomy     Comment: Had vasectomy         PHYSICAL EXAM       Vitals:    03/02/25 1808 03/02/25 1809 03/02/25 1810 03/02/25 1813   BP:       BP Location:       Patient Position:       Pulse: 84 88 87 86   Resp:       Temp:       TempSrc:       SpO2: 95% 96% 96% 97%   Weight:       Height:           Physical Exam  General : Patient is awake, alert, oriented, in no acute distress, nontoxic appearing  HEENT: Pupils are equally round, EOMI, conjunctivae clear  Neck: Neck is supple, full range of motion, trachea  midline  Cardiac: Heart regular rate, rhythm, no murmurs, rubs, or gallops  Lungs: Lungs are clear to auscultation, there is no wheezing, rhonchi, or rales. There is no use of accessory muscles  Abdomen: There is mild tenderness along the left lower quadrant of the abdomen in the inguinal crease which radiates down through the inguinal groin region without any peritoneal signs, abdomen is soft, bowel sounds are present.  Abdomen is soft, nontender, nondistended. There are no firm or pulsatile masses, no rebound rigidity or guarding  Musculoskeletal: Postoperative wounds to the lower thoracic and lumbar spine appears to be healing well, there is no significant erythema, there is no step-off or deformity.  There is very mild tenderness to palpation on the left paraspinal lumbar musculature.  5 out of 5 strength in all 4 extremities.  No focal muscle deficits are appreciated  Neuro: Motor intact, sensory intact, level of consciousness is normal, cerebellar function is normal, reflexes are grossly normal. No evidence of incontinence or loss of bowel or bladder function, no saddle anesthesia noted   Dermatology: Postoperative wound to the lumbar spine is healing well.  Skin is warm and dry  Psych: Mentation is grossly normal, cognition is grossly normal. Affect is appropriate      DIAGNOSTIC RESULTS     EKG:  All EKGs are interpreted by the Emergency Department Physician who either signs or Co-signs this chart in the absence of a cardiologist.    No orders to display       RADIOLOGY:     Encounter Date    2/21/25    CT Abdomen Pelvis Stone Protocol [[id number]] (Order [id number])  Order  Status: Final result     Study Notes     Arianne Levi on 2/21/2025 10:17 AM EST   Chronic left inguinal pain, assess for left inguinal hernia, pain shoots down through scrotum.     Appointment Information    PACS Images     Radiology Images  Study Result    Narrative & Impression   CT ABDOMEN PELVIS STONE PROTOCOL     Date of Exam:  2/21/2025 10:13 AM EST     Indication: chronic left inguinal pain, assess for left inguinal hernia.     Comparison: None available.     Technique: Axial CT images were obtained of the abdomen and pelvis without the administration of contrast. Reconstructed coronal and sagittal images were also obtained. Automated exposure control and iterative construction methods were used.        Findings:     Liver: The liver is unremarkable in morphology. Evaluation for focal liver lesions is limited without IV contrast. No biliary dilation is seen.     Gallbladder: Unremarkable.     Pancreas: Unremarkable.     Spleen: Unremarkable.     Adrenal glands: Unremarkable.     Genitourinary tract: Kidneys are unremarkable. No hydronephrosis is seen. No urinary tract calculi are seen. The visualized portions of the ureters and urinary bladder appear unremarkable. Prostate gland is unremarkable.     Gastrointestinal tract: Limited evaluation of the hollow viscera due to lack of IV contrast administration. No findings to suggest bowel obstruction. Moderate colonic stool.     Appendix: The appendix is not identified.     Other findings: No free air or free fluid is identified. No pathologically enlarged lymph nodes are seen. The abdominal aorta is unremarkable.     Bones and soft tissues: No acute osseous lesion is identified. There are degenerative changes within the spine. Status post PLIF at L2-L3. Multilevel laminectomy changes within the lumbar spine. Bilateral vasectomy clips noted.     Lung bases: Bilateral lung base granulomas. Calcified right hilar lymph nodes are noted.     IMPRESSION:  Impression:  1.No acute abnormality identified within the abdomen or pelvis.  2.No hydronephrosis or urinary tract calculi identified.  3.Additional findings as detailed above.        Electronically Signed: Jose Cobos MD    2/21/2025 10:31 AM EST    Workstation ID: [id number]     Encounter Date    2/13/25     Testicular or Ovarian Vascular  Limited [[id number]] (Order [id number])  Order  Status: Final result     Appointment Information    PACS Images     Radiology Images  Related Results     US Scrotum & Testicles Final result 2/13/2025          Narrative   US SCROTUM AND TESTICLES, US TESTICULAR VASCULAR LIMITED    Date of Exam: 2/13/2025 1:45 PM EST    Indication: Left testicular pain.    Comparison: No comparisons available.    Technique: Multiple sonographic images of the scrotum were obtained in transverse and longitudinal planes. Grayscale and color Doppler duplex techniques were utilized.  Doppler spectral analysis was performed.   ...   Impression   Impression:  1.Both testicles are normal.  2.Small bilateral hydroceles containing echogenic debris.  3.Small septated cyst in the right epididymal head. The left epididymis is normal.  4.Prominent vascular structures in the right and left scrotum which do not enlarge with a Valsalva maneuver to suggest varicoceles.        Electronically Signed: Toby Schmitt MD   2/13/2025 2:59 PM EST  ...        Encounter Date    2/28/25    CT Lumbar Spine Without Contrast [[id number]] (Order [id number])  Order  Status: Final result     Study Notes     Cale Scalesarsalan COSBY on 2/28/2025  9:10 AM EST   Left groin and testiclular pain s/p lumbar fusion Jan 2025, spinal stenosis, NKI     Appointment Information    PACS Images     Radiology Images  Study Result    Narrative & Impression   CT LUMBAR SPINE WO CONTRAST     Date of Exam: 2/28/2025 8:59 AM EST     Indication: groin pain s/p lumbar fusion.     Comparison: Radiographs 1/28/2025, outside MRI 8/1/2024.     Technique: Axial CT images were obtained of the lumbar spine without contrast administration.  Reconstructed coronal and sagittal images were also obtained. Automated exposure control and iterative construction methods were used.        Findings:  Redemonstrated postoperative changes from prior posterior fusion at L2-3, with decompressive laminectomy noted at the adjacent  L3-4, L4-5 level. There is no evidence of unexpected focal fluid collection, hardware displacement, fracture or new   malalignment. There is unchanged straightening of the usual lumbar lordosis. There is no evidence of suspicious lytic or sclerotic osseous lesion. Evaluation of the soft tissues is somewhat limited on CT, however there appears to be no new high-grade   canal impingement, with spondylosis changes otherwise somewhat incompletely evaluated.     IMPRESSION:  Impression:  Postoperative changes from prior L2-3 fusion and posterior decompression at L3-4 and L4-5. There is no evidence of hardware displacement, fracture, new malalignment or abnormal focal fluid collection. Spondylosis changes persist and appear generally mild   although incompletely evaluated on CT.           Electronically Signed: Gene Stevens MD    2/28/2025 9:22 AM EST    Workstation ID: [id number]     [x] Radiologist's Report Reviewed:  MRI Lumbar Spine With & Without Contrast   Final Result   Impression:      1. Status post fusion at the L2/3 level.   2. Questionable extruded disc fragment to the left of midline near the superior endplate of the L3 vertebral body. No resultant canal stenosis or nerve root compression.   3. Postoperative changes from prior ablations at the L4-L5 and S1 levels.      4. Multilevel degenerative disc disease and degenerative facet change resulting in multilevel neural foraminal narrowing throughout the lower lumbar spine as detailed above.            Electronically Signed: Alex Weston MD     3/2/2025 7:34 PM EST     Workstation ID: [id number]          I ordered and independently reviewed the above noted radiographic studies.      I viewed images of MRI lumbar spine which showed postoperative changes per my independent interpretation.    See radiologist's dictation for official interpretation.        LABS:    I have reviewed and interpreted all of the currently available lab results from this visit (if  applicable):  Results for orders placed or performed during the hospital encounter of 03/02/25   Urinalysis With Microscopic If Indicated (No Culture) - Urine, Clean Catch    Collection Time: 03/02/25  5:45 PM    Specimen: Urine, Clean Catch   Result Value Ref Range    Color, UA Yellow Yellow, Straw    Appearance, UA Clear Clear    pH, UA 5.5 5.0 - 8.0    Specific Gravity, UA 1.007 1.001 - 1.030    Glucose, UA Negative Negative    Ketones, UA Negative Negative    Bilirubin, UA Negative Negative    Blood, UA Negative Negative    Protein, UA Negative Negative    Leuk Esterase, UA Negative Negative    Nitrite, UA Negative Negative    Urobilinogen, UA 0.2 E.U./dL 0.2 - 1.0 E.U./dL   Comprehensive Metabolic Panel    Collection Time: 03/02/25  5:46 PM    Specimen: Blood   Result Value Ref Range    Glucose 99 65 - 99 mg/dL    BUN 13 6 - 20 mg/dL    Creatinine 0.80 0.76 - 1.27 mg/dL    Sodium 140 136 - 145 mmol/L    Potassium 4.0 3.5 - 5.2 mmol/L    Chloride 102 98 - 107 mmol/L    CO2 25.0 22.0 - 29.0 mmol/L    Calcium 9.0 8.6 - 10.5 mg/dL    Total Protein 7.5 6.0 - 8.5 g/dL    Albumin 4.0 3.5 - 5.2 g/dL    ALT (SGPT) 9 1 - 41 U/L    AST (SGOT) 14 1 - 40 U/L    Alkaline Phosphatase 103 39 - 117 U/L    Total Bilirubin <0.2 0.0 - 1.2 mg/dL    Globulin 3.5 gm/dL    A/G Ratio 1.1 g/dL    BUN/Creatinine Ratio 16.3 7.0 - 25.0    Anion Gap 13.0 5.0 - 15.0 mmol/L    eGFR 104.5 >60.0 mL/min/1.73   Lipase    Collection Time: 03/02/25  5:46 PM    Specimen: Blood   Result Value Ref Range    Lipase 25 13 - 60 U/L   Lactic Acid, Plasma    Collection Time: 03/02/25  5:46 PM    Specimen: Blood   Result Value Ref Range    Lactate 1.1 0.5 - 2.0 mmol/L   CBC Auto Differential    Collection Time: 03/02/25  5:46 PM    Specimen: Blood   Result Value Ref Range    WBC 9.13 3.40 - 10.80 10*3/mm3    RBC 3.96 (L) 4.14 - 5.80 10*6/mm3    Hemoglobin 10.5 (L) 13.0 - 17.7 g/dL    Hematocrit 33.4 (L) 37.5 - 51.0 %    MCV 84.3 79.0 - 97.0 fL    MCH 26.5  (L) 26.6 - 33.0 pg    MCHC 31.4 (L) 31.5 - 35.7 g/dL    RDW 13.2 12.3 - 15.4 %    RDW-SD 40.6 37.0 - 54.0 fl    MPV 9.9 6.0 - 12.0 fL    Platelets 310 140 - 450 10*3/mm3    Neutrophil % 71.7 42.7 - 76.0 %    Lymphocyte % 12.4 (L) 19.6 - 45.3 %    Monocyte % 12.8 (H) 5.0 - 12.0 %    Eosinophil % 2.5 0.3 - 6.2 %    Basophil % 0.4 0.0 - 1.5 %    Immature Grans % 0.2 0.0 - 0.5 %    Neutrophils, Absolute 6.54 1.70 - 7.00 10*3/mm3    Lymphocytes, Absolute 1.13 0.70 - 3.10 10*3/mm3    Monocytes, Absolute 1.17 (H) 0.10 - 0.90 10*3/mm3    Eosinophils, Absolute 0.23 0.00 - 0.40 10*3/mm3    Basophils, Absolute 0.04 0.00 - 0.20 10*3/mm3    Immature Grans, Absolute 0.02 0.00 - 0.05 10*3/mm3    nRBC 0.0 0.0 - 0.2 /100 WBC   Green Top (Gel)    Collection Time: 03/02/25  5:46 PM   Result Value Ref Range    Extra Tube Hold for add-ons.    Lavender Top    Collection Time: 03/02/25  5:46 PM   Result Value Ref Range    Extra Tube hold for add-on    Gold Top - SST    Collection Time: 03/02/25  5:46 PM   Result Value Ref Range    Extra Tube Hold for add-ons.    Gray Top    Collection Time: 03/02/25  5:46 PM   Result Value Ref Range    Extra Tube Hold for add-ons.    Light Blue Top    Collection Time: 03/02/25  5:46 PM   Result Value Ref Range    Extra Tube Hold for add-ons.         If labs were ordered, I independently reviewed the results and considered them in treating the patient.      EMERGENCY DEPARTMENT COURSE and DIFFERENTIAL DIAGNOSIS/MDM:   Vitals:  AS OF 20:02 EST    BP - 141/93  HR - 86  TEMP - 99.4 °F (37.4 °C) (Oral)  O2 SATS - 97%      Orders placed during this visit:  Orders Placed This Encounter   Procedures    MRI Lumbar Spine With & Without Contrast    [place] Draw    Comprehensive Metabolic Panel    Lipase    Urinalysis With Microscopic If Indicated (No Culture) - Urine, Clean Catch    Lactic Acid, Plasma    CBC Auto Differential    NPO Diet NPO Type: Strict NPO    Undress & Gown    Insert Peripheral IV    CBC &  Differential    Green Top (Gel)    Lavender Top    Gold Top - SST    Gray Top    Light Blue Top       All labs have been independently reviewed by me.  All radiology studies have been reviewed by me and the radiologist dictating the report.  All EKG's have been independently viewed and interpreted by me.      Discussion below represents my analysis of pertinent findings related to patient's condition, differential diagnosis, treatment plan and final disposition.    Differential diagnosis:  The differential diagnosis associated with the patient's presentation includes: Lumbar radiculopathy, inguinal hernia, colitis, diverticulitis, postoperative pain    Additional sources  Discussed/ obtained information from independent historians:   [x] Spouse  [] Parent  [] Family member  [] Friend  [] EMS   [] Other:    External (non-ED) record review:   [] Inpatient record:   [x] Office record: Reviewed records from neurosurgery assessment just a couple days ago the patient continued low back pain and inguinal pain, and undergone recent CT scan images and ultrasounds, they were planning for MRI of the lumbar spine with and without contrast which has not yet been obtained.   [] Outpatient record:   [] Prior Outpatient labs:   [] Prior Outpatient radiology:   [] Primary Care record:   [] Outside ED record:   [] Other:     Patient's care impacted by:   [] Diabetes  [] Hypertension  [] CHF  [] Hyperlipidemia  [] Coronary Artery Disease   [] COPD   [] Cancer   [] Tobacco Abuse   [] Substance Abuse    [] Other:     Care significantly affected by Social Determinants of Health (housing and economic circumstances, unemployment)    [] Yes     [x] No   If yes, Patient's care significantly limited by Social Determinants of Health including:   [] Inadequate housing   [] Low income   [] Alcoholism and drug addiction in family   [] Problems related to primary support group   [] Unemployment   [] Problems related to employment   [] Other Social  Determinants of Health:       MEDICATIONS ADMINISTERED IN ED:  Medications   Sodium Chloride (PF) 0.9 % 10 mL (has no administration in time range)   diazePAM (VALIUM) injection 2.5 mg (2.5 mg Intravenous Given 3/2/25 1812)   gadobenate dimeglumine (MULTIHANCE) injection 20 mL (20 mL Intravenous Given 3/2/25 1840)          MRI:  IMPRESSION:  Impression:     1. Status post fusion at the L2/3 level.  2. Questionable extruded disc fragment to the left of midline near the superior endplate of the L3 vertebral body. No resultant canal stenosis or nerve root compression.  3. Postoperative changes from prior ablations at the L4-L5 and S1 levels.   4. Multilevel degenerative disc disease and degenerative facet change resulting in multilevel neural foraminal narrowing throughout the lower lumbar spine as detailed above.    This is a very pleasant 54-year-old male with had unfortunately recurrent spinal issues lumbar disc disease and has undergone surgical intervention few weeks ago.  He is had left lower back pain, left groin and inguinal pain which he has been working out with his PCP and urology team without any significant findings to date.  Today he notes the pain is in the left lower lumbar spine which is different than the left lower quadrant abdominal pain he has had over the last few weeks.  He is scheduled to have MRI lumbar spine with and without contrast per neurosurgery which we will obtain tonight.  He does not have any signs of acute discitis or cauda equina syndrome on my initial assessment.  He is able to leg raise each leg off the bed with 5 out of 5 strength without any sensory or motor deficit.  CBC with normal white count, stable H&H, kidney function liver function electrolytes are normal, MRI of the lumbar spine does have appears to be a questionable disc fragment to the left at the superior endplate of L3 without any canal stenosis or nerve root compression.  This may be the source of the patient's  continued discomfort.  I had a discussion with Alaina Garcia on-call with neurosurgery who reviewed the films, recommends continue with symptomatic treatments, will have Dr. Escobar's office reach out to the patient for evaluation over the next couple days.  Patient and family updated on bowel regimen, good fluid hydration and medications in the meantime as he has some leftover pain medication can take as needed.    I had a discussion with the patient/family regarding diagnosis, diagnostic results, treatment plan, and medications.  The patient/family indicated understanding of these instructions.  I spent adequate time at the bedside preceding discharge necessary to personally discuss the aftercare instructions, giving patient education, providing explanations of the results of our evaluations/findings, and my decision making to assure that the patient/family understand the plan of care.  Time was allotted to answer questions at that time and throughout the ED course.  Emphasis was placed on timely follow-up after discharge.  I also discussed the potential for the development of an acute emergent condition requiring further evaluation, admission, or even surgical intervention. I discussed that we found nothing during the visit today indicating the need for further workup, admission, or the presence of an unstable medical condition.  I encouraged the patient to return to the emergency department immediately for ANY concerns, worsening, new complaints, or if symptoms persist and unable to seek follow-up in a timely fashion.  The patient/family expressed understanding and agreement with this plan.  The patient will follow-up with their PCP in 1-2 days for reevaluation.     PROCEDURES:  Procedures    CRITICAL CARE TIME    Total Critical Care time was 0 minutes, excluding separately reportable procedures.   There was a high probability of clinically significant/life threatening deterioration in the patient's condition  which required my urgent intervention.      FINAL IMPRESSION      1. Acute left-sided low back pain without sciatica    2. Left inguinal pain    3. Degeneration of intervertebral disc of lumbar region with discogenic back pain    4. S/P lumbar fusion          DISPOSITION/PLAN     ED Disposition       ED Disposition   Discharge    Condition   Stable    Comment   --               PATIENT REFERRED TO:  Morgan Escobar MD  [address]  [facility] [address]  [place] [id number] [phone]    Schedule an appointment as soon as possible for a visit   Your neurosurgery team    Symone Sanchez MD  [address] [place] [address] A  [facility] [address] [phone]    In 2 days      [facility] EMERGENCY DEPARTMENT  [address] [facility] [address] [phone]    If symptoms worsen      DISCHARGE MEDICATIONS:     Medication List        CONTINUE taking these medications      Fluorouracil 4 % cream     ibuprofen 600 MG tablet  Commonly known as: ADVIL,MOTRIN  Take 1 tablet by mouth Every 6 (Six) Hours As Needed for Moderate Pain.     multivitamin with minerals tablet tablet     omeprazole 40 MG capsule  Commonly known as: priLOSEC                  Comment: Please note this report has been produced using speech recognition software.      Ace Mast DO  Attending Emergency Physician         Ace Mast DO  03/02/25 2003

## 2025-03-03 ENCOUNTER — OFFICE VISIT (OUTPATIENT)
Dept: NEUROSURGERY | Facility: CLINIC | Age: 55
End: 2025-03-03
Payer: COMMERCIAL

## 2025-03-03 VITALS
BODY MASS INDEX: 35.22 KG/M2 | HEIGHT: 70 IN | SYSTOLIC BLOOD PRESSURE: 124 MMHG | TEMPERATURE: 98.2 F | DIASTOLIC BLOOD PRESSURE: 72 MMHG | WEIGHT: 246 LBS

## 2025-03-03 DIAGNOSIS — Z98.1 S/P LUMBAR FUSION: ICD-10-CM

## 2025-03-03 DIAGNOSIS — Z98.890 S/P LUMBAR LAMINECTOMY: ICD-10-CM

## 2025-03-03 DIAGNOSIS — M54.17 LUMBOSACRAL RADICULOPATHY AT L2: Primary | ICD-10-CM

## 2025-03-03 DIAGNOSIS — M54.50 ACUTE BILATERAL LOW BACK PAIN WITHOUT SCIATICA: ICD-10-CM

## 2025-03-03 PROCEDURE — 99024 POSTOP FOLLOW-UP VISIT: CPT | Performed by: PHYSICIAN ASSISTANT

## 2025-03-03 RX ORDER — PREDNISONE 20 MG/1
TABLET ORAL
Qty: 11 TABLET | Refills: 0 | Status: SHIPPED | OUTPATIENT
Start: 2025-03-03 | End: 2025-03-12

## 2025-03-03 RX ORDER — DIAZEPAM 10 MG/1
10 TABLET ORAL EVERY 6 HOURS PRN
Qty: 20 TABLET | Refills: 1 | Status: SHIPPED | OUTPATIENT
Start: 2025-03-03

## 2025-03-03 RX ORDER — PREGABALIN 150 MG/1
150 CAPSULE ORAL 2 TIMES DAILY
Qty: 40 CAPSULE | Refills: 1 | Status: SHIPPED | OUTPATIENT
Start: 2025-03-03

## 2025-03-03 NOTE — H&P (VIEW-ONLY)
Jaylan Moran   1970   [id number]       03/03/2025     Chief Complaint   Patient presents with    Post-op    Back Pain        Post-op  Back Pain  Associated symptoms include abdominal pain and numbness. Pertinent negatives include no chest pain, dysuria, fever, headaches or weakness.      54-year-old gentleman who is well known to our service: right CTR 2016, C4-7 ACDF 2017, L2-5 laminectomy 2021 and then last year he developed progressive back and lower extremity pain due to spondylolisthesis at the L2-3 site with recurrent stenosis. As such, the underwent redo decompression and fusion at this level on 1/9/25    Surgery was without complication and he was discharged to home on postoperative day 2.  When he was seen in the office for his postop visit on 1/31, he had complaints of a 3-4day history of left groin and testicular pain.  He was seen by urology without any acute findings.  His pain in the groin scrotum and then started in the back, it was so severe over the weekend he went to the emergency department on Sunday where an MRI of the lumbar spine was obtained.  The patient is in the office for neurosurgical evaluation.    Chronic Illnesses:  Past Medical History:  No date: Arthritis  2010: Arthritis of back  2016: Arthritis of neck      Comment:  Had surgery  No date: Back problem  No date: Carpal tunnel syndrome      Comment:  RIGHT  2021: Cervical disc disorder      Comment:  Had surgery  No date: Claustrophobia  No date: GERD (gastroesophageal reflux disease)  No date: History of anemia  No date: Low back pain  2010: Low back strain  2010: Lumbosacral disc disease  2016: Neck strain  No date: Sleep apnea      Comment:  mouth guard  No date: Thoracic disc disorder  No date: Wears glasses     Past Surgical History:   Procedure Laterality Date    ANTERIOR CERVICAL DISCECTOMY W/ FUSION N/A 05/18/2017    Procedure: CERVICAL DISCECTOMY ANTERIOR WITH FUSION C4-7;  Surgeon: Morgan Escobar MD;   Location:  [facility];  Service:     BACK SURGERY  02/08/2021    laminectomy    CARPAL TUNNEL RELEASE Right 09/01/2016    Procedure: RIGHT CARPAL TUNNEL RELEASE;  Surgeon: Morgan Escobar MD;  Location:  [facility];  Service:     COLONOSCOPY      CYSTOSCOPY TRANSURETHRAL RESECTION OF PROSTATE N/A 11/19/2021    Procedure: CYSTOSCOPY, THULIUM LASER ABLATION OF PROSTATE;  Surgeon: Abdelrahman Calero MD;  Location:  [facility];  Service: Urology;  Laterality: N/A;    ENDOSCOPY      KNEE ARTHROSCOPY Right 11/22/2024    partially torn meniscus    LUMBAR LAMINECTOMY DISCECTOMY DECOMPRESSION N/A 02/11/2021    Procedure: LUMBAR LAMINECTOMY DISCECTOMY DECOMPRESSION POSTERIOR L2-5;  Surgeon: Morgan Escobar MD;  Location:  [facility];  Service: Neurosurgery;  Laterality: N/A;    LUMBAR LAMINECTOMY WITH FUSION N/A 01/09/2025    Procedure: LUMBAR FUSION DECOMPRESSON WITH PEDICLE SCREWS L2-3;  Surgeon: Morgan Escobar MD;  Location:  [facility];  Service: Neurosurgery;  Laterality: N/A;    NASAL SEPTUM SURGERY      NECK SURGERY  5/2016    OTHER SURGICAL HISTORY  12/11/2023    L2-5 Intracept procedure- Dr. Hai Botello    SHOULDER SURGERY Right 12/10/2021    shoulder arthroscopy with RCR, biceps tenodesis - JRT    VASECTOMY      Dont remember actual date.        Allergies   Allergen Reactions    Penicillins Other (See Comments) and Unknown - Low Severity     SINCE INFANCY          Current Outpatient Medications:     Fluorouracil 4 % cream, APPLY TO AFFECTED AREAS OF FACE TWICE DAILY FOR 5 DAYS, Disp: , Rfl:     ibuprofen (ADVIL,MOTRIN) 600 MG tablet, Take 1 tablet by mouth Every 6 (Six) Hours As Needed for Moderate Pain., Disp: 90 tablet, Rfl: 0    Multiple Vitamins-Minerals (MULTIVITAL PO), Take 1 tablet by mouth Daily., Disp: , Rfl:     omeprazole (PriLOSEC) 40 MG capsule, Take 1 capsule by mouth As Needed (acid reflux)., Disp: , Rfl:   No current facility-administered medications for this visit.    Facility-Administered  Medications Ordered in Other Visits:     mupirocin (BACTROBAN) 2 % nasal ointment, , Nasal, BID, Zhane Ferreira PA-C     Social History     Socioeconomic History    Marital status:    Tobacco Use    Smoking status: Never     Passive exposure: Never    Smokeless tobacco: Never   Vaping Use    Vaping status: Never Used   Substance and Sexual Activity    Alcohol use: Yes     Alcohol/week: 1.0 - 2.0 standard drink of alcohol     Types: 1 - 2 Cans of beer per week     Comment: occasionally    Drug use: No    Sexual activity: Yes     Partners: Female     Birth control/protection: None, Vasectomy     Comment: Had vasectomy        family history includes Arthritis in his brother and mother; COPD in his brother, father, and mother; Diabetes in his mother; Heart disease in his brother and mother; Hypertension in his mother.     Review of Systems   Constitutional:  Negative for activity change, appetite change, chills, diaphoresis, fatigue, fever and unexpected weight change.   HENT:  Negative for congestion, dental problem, drooling, ear discharge, ear pain, facial swelling, hearing loss, mouth sores, nosebleeds, postnasal drip, rhinorrhea, sinus pressure, sinus pain, sneezing, sore throat, tinnitus, trouble swallowing and voice change.    Eyes:  Negative for photophobia, pain, discharge, redness, itching and visual disturbance.   Respiratory:  Negative for apnea, cough, choking, chest tightness, shortness of breath, wheezing and stridor.    Cardiovascular:  Negative for chest pain, palpitations and leg swelling.   Gastrointestinal:  Positive for abdominal pain. Negative for abdominal distention, anal bleeding, blood in stool, constipation, diarrhea, nausea, rectal pain and vomiting.   Endocrine: Negative for cold intolerance, heat intolerance, polydipsia, polyphagia and polyuria.   Genitourinary:  Negative for decreased urine volume, difficulty urinating, dysuria, enuresis, flank pain, frequency, genital sores,  hematuria, penile discharge, penile pain, penile swelling, scrotal swelling, testicular pain and urgency.   Musculoskeletal:  Positive for back pain. Negative for arthralgias, gait problem, joint swelling, myalgias, neck pain and neck stiffness.   Skin:  Negative for color change, pallor, rash and wound.   Allergic/Immunologic: Negative for environmental allergies, food allergies and immunocompromised state.   Neurological:  Positive for numbness. Negative for dizziness, tremors, seizures, syncope, facial asymmetry, speech difficulty, weakness, light-headedness and headaches.   Hematological:  Negative for adenopathy. Does not bruise/bleed easily.   Psychiatric/Behavioral:  Negative for agitation, behavioral problems, confusion, decreased concentration, dysphoric mood, hallucinations, self-injury, sleep disturbance and suicidal ideas. The patient is not nervous/anxious and is not hyperactive.           Gait & Balance Assessment :  Risk assessment for falls. Fall precautions.  universal fall precautions, such as;   Using gait aids a cane, walker at the appropriate height at all times for ambulation or if necessary a wheelchair  Removing all area rugs and coffee tables to create a safe environment at home  Ensure clean, dry floors  Wearing supportive footwear and properly fitting clothing  Ensure bed/chair is appropriate height and patient's feet can touch the floor  Using a shower transfer bench  Using walk-in shower and having shower safety bars installed  Ensure proper lighting, minimize glare  Have nightlights operational and in use  Participation in an exercise program for gait training, balance training and strength  Avoid carrying laundry up and down steps  Ensure proper compliance and organization of medications to avoid errors   Avoid use of over the counter sedatives and alcohol consumption  Ensure easy access to call bell, glasses, TV control, telephone  Ensure glasses/hearing aids are in use or close by  "(on top of night table)     Social History    Tobacco Use      Smoking status: Never        Passive exposure: Never      Smokeless tobacco: Never      Body mass index is 35.3 kg/m².           Physical Examination:  /72 (BP Location: Left arm, Patient Position: Sitting, Cuff Size: Adult)   Temp 98.2 °F (36.8 °C) (Infrared)   Ht 177.8 cm (70\")   Wt 112 kg (246 lb)   BMI 35.30 kg/m²    HEENT-wnl  Lungs-No wheezing or SOB  Patient is awake, alert and oriented.  5/5 in the extremities.  Sensation intact.  Gait is antalgic, favoring the left leg    Radiological Data Review:  MRI of the lumbar spine is reviewed which shows some shadowing at L1-2 on the left that due to the artifact is unclear if this is disc herniation.  I have reviewed the films with Dr. Blanco and with Dr. Elder.    Assessment and Plan:  Diagnoses and all orders for this visit:    1. S/P lumbar fusion (Primary)    2. Lumbosacral radiculopathy at L2       Mr. Moran is having an acute flareup of back pain and L2 pain.  I have been in contact with pain management, Dr. Botello who is in agreement to see the patient and provide him with an injection as well as pain medications.  I am going to provide him with Lyrica, steroids and medications for back spasms.  I have told him and his wife that if he has not got significant improvement in a couple days with medications and an injection that we will be more than happy to proceed with a lumbar myelogram to further delineate any nerve root compression.    Any copied data from previous notes included in the (1) HPI, (2) PE, (3) MDM and/or assessment and plan has been reviewed and is accurate as of this date.    Addie Garcia, PAC    PCP:  Symone Sanchez MD     3/7/2025    Mr. Moran underwent an injection by Dr. Botello and had improvement in his symptoms, he called today stating that his symptoms were starting to return.  I have previously discussed with him that if his symptoms did not " improve or they return we would move on with a lumbar myelogram to delineate nerve root compression.  Mr. GerardDean would like to proceed with this diagnostic study.  I will ask Dr. Elder to complete the study on Monday, 3/10/2025.

## 2025-03-03 NOTE — PROGRESS NOTES
Jaylan Moran   1970   [id number]       03/03/2025     Chief Complaint   Patient presents with    Post-op    Back Pain        Post-op  Back Pain  Associated symptoms include abdominal pain and numbness. Pertinent negatives include no chest pain, dysuria, fever, headaches or weakness.      54-year-old gentleman who is well known to our service: right CTR 2016, C4-7 ACDF 2017, L2-5 laminectomy 2021 and then last year he developed progressive back and lower extremity pain due to spondylolisthesis at the L2-3 site with recurrent stenosis. As such, the underwent redo decompression and fusion at this level on 1/9/25    Surgery was without complication and he was discharged to home on postoperative day 2.  When he was seen in the office for his postop visit on 1/31, he had complaints of a 3-4day history of left groin and testicular pain.  He was seen by urology without any acute findings.  His pain in the groin scrotum and then started in the back, it was so severe over the weekend he went to the emergency department on Sunday where an MRI of the lumbar spine was obtained.  The patient is in the office for neurosurgical evaluation.    Chronic Illnesses:  Past Medical History:  No date: Arthritis  2010: Arthritis of back  2016: Arthritis of neck      Comment:  Had surgery  No date: Back problem  No date: Carpal tunnel syndrome      Comment:  RIGHT  2021: Cervical disc disorder      Comment:  Had surgery  No date: Claustrophobia  No date: GERD (gastroesophageal reflux disease)  No date: History of anemia  No date: Low back pain  2010: Low back strain  2010: Lumbosacral disc disease  2016: Neck strain  No date: Sleep apnea      Comment:  mouth guard  No date: Thoracic disc disorder  No date: Wears glasses     Past Surgical History:   Procedure Laterality Date    ANTERIOR CERVICAL DISCECTOMY W/ FUSION N/A 05/18/2017    Procedure: CERVICAL DISCECTOMY ANTERIOR WITH FUSION C4-7;  Surgeon: Morgan Escobar MD;   Location:  [facility];  Service:     BACK SURGERY  02/08/2021    laminectomy    CARPAL TUNNEL RELEASE Right 09/01/2016    Procedure: RIGHT CARPAL TUNNEL RELEASE;  Surgeon: Morgan Escobar MD;  Location:  [facility];  Service:     COLONOSCOPY      CYSTOSCOPY TRANSURETHRAL RESECTION OF PROSTATE N/A 11/19/2021    Procedure: CYSTOSCOPY, THULIUM LASER ABLATION OF PROSTATE;  Surgeon: Abdelrahman Calero MD;  Location:  [facility];  Service: Urology;  Laterality: N/A;    ENDOSCOPY      KNEE ARTHROSCOPY Right 11/22/2024    partially torn meniscus    LUMBAR LAMINECTOMY DISCECTOMY DECOMPRESSION N/A 02/11/2021    Procedure: LUMBAR LAMINECTOMY DISCECTOMY DECOMPRESSION POSTERIOR L2-5;  Surgeon: Morgan Escobar MD;  Location:  [facility];  Service: Neurosurgery;  Laterality: N/A;    LUMBAR LAMINECTOMY WITH FUSION N/A 01/09/2025    Procedure: LUMBAR FUSION DECOMPRESSON WITH PEDICLE SCREWS L2-3;  Surgeon: Morgan Escobar MD;  Location:  [facility];  Service: Neurosurgery;  Laterality: N/A;    NASAL SEPTUM SURGERY      NECK SURGERY  5/2016    OTHER SURGICAL HISTORY  12/11/2023    L2-5 Intracept procedure- Dr. Hai Botello    SHOULDER SURGERY Right 12/10/2021    shoulder arthroscopy with RCR, biceps tenodesis - JRT    VASECTOMY      Dont remember actual date.        Allergies   Allergen Reactions    Penicillins Other (See Comments) and Unknown - Low Severity     SINCE INFANCY          Current Outpatient Medications:     Fluorouracil 4 % cream, APPLY TO AFFECTED AREAS OF FACE TWICE DAILY FOR 5 DAYS, Disp: , Rfl:     ibuprofen (ADVIL,MOTRIN) 600 MG tablet, Take 1 tablet by mouth Every 6 (Six) Hours As Needed for Moderate Pain., Disp: 90 tablet, Rfl: 0    Multiple Vitamins-Minerals (MULTIVITAL PO), Take 1 tablet by mouth Daily., Disp: , Rfl:     omeprazole (PriLOSEC) 40 MG capsule, Take 1 capsule by mouth As Needed (acid reflux)., Disp: , Rfl:   No current facility-administered medications for this visit.    Facility-Administered  Medications Ordered in Other Visits:     mupirocin (BACTROBAN) 2 % nasal ointment, , Nasal, BID, Zhane Ferreira PA-C     Social History     Socioeconomic History    Marital status:    Tobacco Use    Smoking status: Never     Passive exposure: Never    Smokeless tobacco: Never   Vaping Use    Vaping status: Never Used   Substance and Sexual Activity    Alcohol use: Yes     Alcohol/week: 1.0 - 2.0 standard drink of alcohol     Types: 1 - 2 Cans of beer per week     Comment: occasionally    Drug use: No    Sexual activity: Yes     Partners: Female     Birth control/protection: None, Vasectomy     Comment: Had vasectomy        family history includes Arthritis in his brother and mother; COPD in his brother, father, and mother; Diabetes in his mother; Heart disease in his brother and mother; Hypertension in his mother.     Review of Systems   Constitutional:  Negative for activity change, appetite change, chills, diaphoresis, fatigue, fever and unexpected weight change.   HENT:  Negative for congestion, dental problem, drooling, ear discharge, ear pain, facial swelling, hearing loss, mouth sores, nosebleeds, postnasal drip, rhinorrhea, sinus pressure, sinus pain, sneezing, sore throat, tinnitus, trouble swallowing and voice change.    Eyes:  Negative for photophobia, pain, discharge, redness, itching and visual disturbance.   Respiratory:  Negative for apnea, cough, choking, chest tightness, shortness of breath, wheezing and stridor.    Cardiovascular:  Negative for chest pain, palpitations and leg swelling.   Gastrointestinal:  Positive for abdominal pain. Negative for abdominal distention, anal bleeding, blood in stool, constipation, diarrhea, nausea, rectal pain and vomiting.   Endocrine: Negative for cold intolerance, heat intolerance, polydipsia, polyphagia and polyuria.   Genitourinary:  Negative for decreased urine volume, difficulty urinating, dysuria, enuresis, flank pain, frequency, genital sores,  hematuria, penile discharge, penile pain, penile swelling, scrotal swelling, testicular pain and urgency.   Musculoskeletal:  Positive for back pain. Negative for arthralgias, gait problem, joint swelling, myalgias, neck pain and neck stiffness.   Skin:  Negative for color change, pallor, rash and wound.   Allergic/Immunologic: Negative for environmental allergies, food allergies and immunocompromised state.   Neurological:  Positive for numbness. Negative for dizziness, tremors, seizures, syncope, facial asymmetry, speech difficulty, weakness, light-headedness and headaches.   Hematological:  Negative for adenopathy. Does not bruise/bleed easily.   Psychiatric/Behavioral:  Negative for agitation, behavioral problems, confusion, decreased concentration, dysphoric mood, hallucinations, self-injury, sleep disturbance and suicidal ideas. The patient is not nervous/anxious and is not hyperactive.           Gait & Balance Assessment :  Risk assessment for falls. Fall precautions.  universal fall precautions, such as;   Using gait aids a cane, walker at the appropriate height at all times for ambulation or if necessary a wheelchair  Removing all area rugs and coffee tables to create a safe environment at home  Ensure clean, dry floors  Wearing supportive footwear and properly fitting clothing  Ensure bed/chair is appropriate height and patient's feet can touch the floor  Using a shower transfer bench  Using walk-in shower and having shower safety bars installed  Ensure proper lighting, minimize glare  Have nightlights operational and in use  Participation in an exercise program for gait training, balance training and strength  Avoid carrying laundry up and down steps  Ensure proper compliance and organization of medications to avoid errors   Avoid use of over the counter sedatives and alcohol consumption  Ensure easy access to call bell, glasses, TV control, telephone  Ensure glasses/hearing aids are in use or close by  "(on top of night table)     Social History    Tobacco Use      Smoking status: Never        Passive exposure: Never      Smokeless tobacco: Never      Body mass index is 35.3 kg/m².           Physical Examination:  /72 (BP Location: Left arm, Patient Position: Sitting, Cuff Size: Adult)   Temp 98.2 °F (36.8 °C) (Infrared)   Ht 177.8 cm (70\")   Wt 112 kg (246 lb)   BMI 35.30 kg/m²    HEENT-wnl  Lungs-No wheezing or SOB  Patient is awake, alert and oriented.  5/5 in the extremities.  Sensation intact.  Gait is antalgic, favoring the left leg    Radiological Data Review:  MRI of the lumbar spine is reviewed which shows some shadowing at L1-2 on the left that due to the artifact is unclear if this is disc herniation.  I have reviewed the films with Dr. Blanco and with Dr. Elder.    Assessment and Plan:  Diagnoses and all orders for this visit:    1. S/P lumbar fusion (Primary)    2. Lumbosacral radiculopathy at L2       Mr. Moran is having an acute flareup of back pain and L2 pain.  I have been in contact with pain management, Dr. Botello who is in agreement to see the patient and provide him with an injection as well as pain medications.  I am going to provide him with Lyrica, steroids and medications for back spasms.  I have told him and his wife that if he has not got significant improvement in a couple days with medications and an injection that we will be more than happy to proceed with a lumbar myelogram to further delineate any nerve root compression.    Any copied data from previous notes included in the (1) HPI, (2) PE, (3) MDM and/or assessment and plan has been reviewed and is accurate as of this date.    Addie Garcia, PAC    PCP:  Symone Sanchez MD     3/7/2025    Mr. Moran underwent an injection by Dr. Botello and had improvement in his symptoms, he called today stating that his symptoms were starting to return.  I have previously discussed with him that if his symptoms did not " improve or they return we would move on with a lumbar myelogram to delineate nerve root compression.  Mr. GerardDean would like to proceed with this diagnostic study.  I will ask Dr. Elder to complete the study on Monday, 3/10/2025.

## 2025-03-03 NOTE — DISCHARGE INSTRUCTIONS
Take your medications as previously prescribed, maintain good fluid hydration, bowel regimen as discussed, use your stronger pain medication as needed in the meantime.  Follow-up with your neurologist in the next couple days as discussed.  Return to the ED with any worsening symptoms or further concerns.

## 2025-03-05 ENCOUNTER — TELEPHONE (OUTPATIENT)
Dept: NEUROSURGERY | Facility: CLINIC | Age: 55
End: 2025-03-05
Payer: COMMERCIAL

## 2025-03-05 NOTE — TELEPHONE ENCOUNTER
Caller:  STANTON YOUNGBLOOD    Relationship:SELF    Best call back number: [phone]    What is your medical concern? PATIENT CALLED AND STATES WHEN HE WAS SEEN WITH ASHLEY OLIVIA 3/3/2025 HE WAS PRESCRIBED PREDNISONE, VALIUM AND LYRICA AND ON 3/3/2025 WAS ALSO PRESCRIBED PERCOCET BY DR. GOFF.  PATIENT STATES HE HAD HIS INJECTION YESTERDAY 3/4/2025 AND HE WOULD LIKE TO KNOW IF HE IS SUPPOSED TO CONTINUE TAKING THESE MEDICATIONS AS PRESCRIBED OR NOT.  PATIENT STATES THE INJECTION AS OF RIGHT NOW HAS WORKED.  PATIENT STATES THAT HE WOULD LIKE TO KNOW IF THIS CAN BE ANSWERED QUICKLY.      PLEASE CALL PATIENT   THANK YOU

## 2025-03-05 NOTE — TELEPHONE ENCOUNTER
Spoke to patient. Advised to take Lyrica as prescribed BID, to hold prednisone for now as discussed with Addie Garcia on 3/4/25, and to only take diazepam and oxycodone as needed.

## 2025-03-07 PROBLEM — M54.50 ACUTE BILATERAL LOW BACK PAIN WITHOUT SCIATICA: Status: ACTIVE | Noted: 2025-03-03

## 2025-03-07 PROBLEM — M54.17 LUMBOSACRAL RADICULOPATHY AT L2: Status: ACTIVE | Noted: 2025-03-03

## 2025-03-10 ENCOUNTER — TELEPHONE (OUTPATIENT)
Dept: NEUROSURGERY | Facility: CLINIC | Age: 55
End: 2025-03-10
Payer: COMMERCIAL

## 2025-03-10 NOTE — TELEPHONE ENCOUNTER
Provider:  Jael  Surgery/Procedure:  Lumbar myelogram  Surgery/Procedure Date:  3/12/25  Last visit:   3/3/25  Next visit: NA     Reason for call:  Patient called in regards to upcoming myelogram. He has some concerns in regards to his current pain level and completing the procedure. He has had two myelograms in the past, but reports his pain is greater now than previously, and is concerned he may not be able to complete the procedure. I did inform him that Dr. Elder does do conscious sedation to make his patients comfortable for the procedure, but that I would also pass along his concerns so he is aware.

## 2025-03-12 ENCOUNTER — APPOINTMENT (OUTPATIENT)
Dept: CT IMAGING | Facility: HOSPITAL | Age: 55
End: 2025-03-12
Payer: COMMERCIAL

## 2025-03-12 ENCOUNTER — HOSPITAL ENCOUNTER (OUTPATIENT)
Facility: HOSPITAL | Age: 55
Setting detail: HOSPITAL OUTPATIENT SURGERY
Discharge: HOME | End: 2025-03-12
Attending: RADIOLOGY | Admitting: RADIOLOGY
Payer: COMMERCIAL

## 2025-03-12 VITALS
TEMPERATURE: 97.8 F | DIASTOLIC BLOOD PRESSURE: 79 MMHG | HEART RATE: 81 BPM | SYSTOLIC BLOOD PRESSURE: 112 MMHG | OXYGEN SATURATION: 100 %

## 2025-03-12 DIAGNOSIS — Z98.1 S/P LUMBAR FUSION: ICD-10-CM

## 2025-03-12 DIAGNOSIS — M54.17 LUMBOSACRAL RADICULOPATHY AT L2: ICD-10-CM

## 2025-03-12 DIAGNOSIS — M54.50 ACUTE BILATERAL LOW BACK PAIN WITHOUT SCIATICA: ICD-10-CM

## 2025-03-12 DIAGNOSIS — Z98.890 S/P LUMBAR LAMINECTOMY: ICD-10-CM

## 2025-03-12 PROCEDURE — 62304 MYELOGRAPHY LUMBAR INJECTION: CPT | Performed by: RADIOLOGY

## 2025-03-12 PROCEDURE — 25510000001 IOPAMIDOL 41 % SOLUTION: Performed by: RADIOLOGY

## 2025-03-12 PROCEDURE — 72132 CT LUMBAR SPINE W/DYE: CPT

## 2025-03-12 PROCEDURE — 25010000002 LIDOCAINE PF 1% 1 % SOLUTION: Performed by: RADIOLOGY

## 2025-03-12 RX ORDER — IOPAMIDOL 408 MG/ML
INJECTION, SOLUTION INTRATHECAL
Status: DISCONTINUED | OUTPATIENT
Start: 2025-03-12 | End: 2025-03-12 | Stop reason: HOSPADM

## 2025-03-12 RX ORDER — LIDOCAINE HYDROCHLORIDE 10 MG/ML
INJECTION, SOLUTION EPIDURAL; INFILTRATION; INTRACAUDAL; PERINEURAL
Status: DISCONTINUED | OUTPATIENT
Start: 2025-03-12 | End: 2025-03-12 | Stop reason: HOSPADM

## 2025-03-12 RX ORDER — OXYCODONE AND ACETAMINOPHEN 7.5; 325 MG/1; MG/1
1 TABLET ORAL EVERY 6 HOURS PRN
COMMUNITY

## 2025-03-12 RX ORDER — DIAZEPAM 10 MG/1
10 TABLET ORAL EVERY 6 HOURS PRN
Qty: 20 TABLET | Refills: 1 | Status: SHIPPED | OUTPATIENT
Start: 2025-03-12

## 2025-03-18 ENCOUNTER — OFFICE VISIT (OUTPATIENT)
Dept: NEUROSURGERY | Facility: CLINIC | Age: 55
End: 2025-03-18
Payer: COMMERCIAL

## 2025-03-18 VITALS — WEIGHT: 246 LBS | HEIGHT: 70 IN | TEMPERATURE: 98 F | BODY MASS INDEX: 35.22 KG/M2

## 2025-03-18 DIAGNOSIS — R10.9 LEFT FLANK PAIN: Primary | ICD-10-CM

## 2025-03-18 DIAGNOSIS — Z98.1 S/P LUMBAR FUSION: ICD-10-CM

## 2025-03-18 DIAGNOSIS — M54.50 ACUTE BILATERAL LOW BACK PAIN WITHOUT SCIATICA: ICD-10-CM

## 2025-03-18 DIAGNOSIS — M54.16 LUMBAR RADICULOPATHY: ICD-10-CM

## 2025-03-18 DIAGNOSIS — M54.17 LUMBOSACRAL RADICULOPATHY AT L2: ICD-10-CM

## 2025-03-18 PROCEDURE — 99024 POSTOP FOLLOW-UP VISIT: CPT | Performed by: NEUROLOGICAL SURGERY

## 2025-03-18 RX ORDER — PREGABALIN 150 MG/1
150 CAPSULE ORAL 2 TIMES DAILY
Qty: 60 CAPSULE | Refills: 1 | Status: SHIPPED | OUTPATIENT
Start: 2025-03-18

## 2025-03-18 NOTE — PROGRESS NOTES
Patient: Jaylan Moran  : 1970    Primary Care Provider: Symone Sanchez MD    Requesting Provider: As above        History    Chief Complaint: Left flank and abdominal pain.    History of Present Illness: Mr. Moran is a 54-year-old gentleman is well-known to my service. He underwent right carpal tunnel release in 2016. In 2017 he underwent ACDF C4-7. He is also progressive back and lower extremity pain and ultimately on 2021 underwent decompression from L2-5.  Given recurrent stenosis and instability he underwent additional decompression with fusion stabilization at L2-3 on 2025.  Operatively he has had some numbness in the right thigh.  He did well for about 3 weeks following surgery when he developed pain in his left lower abdomen extending in the left inguinal region.  Has had a heaviness or sore feeling in his left testicle.  He underwent urology evaluation which was unremarkable.  He has recently undergone additional evaluation of his back.  He has no left thigh symptoms.  The testicular symptoms have waned.  Most of the pain is in his left abdomen and left flank.    Review of Systems   Constitutional:  Negative for activity change, appetite change, chills, diaphoresis, fatigue, fever and unexpected weight change.   HENT:  Negative for congestion, dental problem, drooling, ear discharge, ear pain, facial swelling, hearing loss, mouth sores, nosebleeds, postnasal drip, rhinorrhea, sinus pressure, sinus pain, sneezing, sore throat, tinnitus, trouble swallowing and voice change.    Eyes:  Negative for photophobia, pain, discharge, redness, itching and visual disturbance.   Respiratory:  Negative for apnea, cough, choking, chest tightness, shortness of breath, wheezing and stridor.    Cardiovascular:  Negative for chest pain, palpitations and leg swelling.   Gastrointestinal:  Negative for abdominal distention, abdominal pain, anal bleeding, blood in stool, constipation, diarrhea,  "nausea, rectal pain and vomiting.   Endocrine: Negative for cold intolerance, heat intolerance, polydipsia, polyphagia and polyuria.   Genitourinary:  Negative for decreased urine volume, difficulty urinating, dysuria, enuresis, flank pain, frequency, genital sores, hematuria and urgency.   Musculoskeletal:  Negative for arthralgias, back pain, gait problem, joint swelling, myalgias, neck pain and neck stiffness.   Skin:  Negative for color change, pallor, rash and wound.   Allergic/Immunologic: Negative for environmental allergies, food allergies and immunocompromised state.   Neurological:  Negative for dizziness, tremors, seizures, syncope, facial asymmetry, speech difficulty, weakness, light-headedness, numbness and headaches.   Hematological:  Negative for adenopathy. Does not bruise/bleed easily.   Psychiatric/Behavioral:  Negative for agitation, behavioral problems, confusion, decreased concentration, dysphoric mood, hallucinations, self-injury, sleep disturbance and suicidal ideas. The patient is not nervous/anxious and is not hyperactive.        The patient's past medical history, past surgical history, family history, and social history have been reviewed at length in the electronic medical record.      Physical Exam:   Temp 98 °F (36.7 °C) (Temporal)   Ht 177.8 cm (70\")   Wt 112 kg (246 lb)   BMI 35.30 kg/m²   Symptoms worsen if we externally rotate his hips on each side.  Straight leg raising is negative.  Dick signs are negative.    Medical Decision Making    Data Review:   (All imaging studies were personally reviewed unless stated otherwise)  MRI of the lumbar spine demonstrates extensive postsurgical artifact and not much can be said.    Lumbar CT myelogram demonstrates a filling defect on the left at L2-3.  I am not sure whether this is due to some retropulsed bone graft or potentially to disc herniation.  His cages are in good position as are his pedicle screws.    Diagnosis:   Left abdominal " and flank pain.    Treatment Options:   There are findings on the left at L2-3 but this does not correspond well with his current complaints of abdominal and flank pain.  I am going to check an MRI of his thoracic spine and see him in follow-up.  He will continue his Percocet.  I have renewed his Lyrica.  If he continues to struggle and the thoracic MRI is unrewarding then we will need to consider reexploring the L2-3 surgical site on the left.      Scribed for Morgan Escobar MD by Radha Layne CMA on 3/18/2025 16:06 EDT       I, Dr. Escobar, personally performed the services described in the documentation, as scribed in my presence, and it is both accurate and complete.

## 2025-03-19 ENCOUNTER — HOSPITAL ENCOUNTER (OUTPATIENT)
Dept: MRI IMAGING | Facility: HOSPITAL | Age: 55
Discharge: HOME | End: 2025-03-19
Admitting: NEUROLOGICAL SURGERY
Payer: COMMERCIAL

## 2025-03-19 DIAGNOSIS — R10.9 LEFT FLANK PAIN: ICD-10-CM

## 2025-03-19 DIAGNOSIS — Z98.1 S/P LUMBAR FUSION: ICD-10-CM

## 2025-03-19 PROCEDURE — 72146 MRI CHEST SPINE W/O DYE: CPT

## 2025-03-21 ENCOUNTER — OFFICE VISIT (OUTPATIENT)
Dept: NEUROSURGERY | Facility: CLINIC | Age: 55
End: 2025-03-21
Payer: COMMERCIAL

## 2025-03-21 VITALS — HEIGHT: 70 IN | WEIGHT: 246 LBS | BODY MASS INDEX: 35.22 KG/M2 | TEMPERATURE: 97.9 F

## 2025-03-21 DIAGNOSIS — R10.9 LEFT FLANK PAIN: Primary | ICD-10-CM

## 2025-03-21 DIAGNOSIS — Z98.1 S/P LUMBAR FUSION: ICD-10-CM

## 2025-03-21 PROCEDURE — 99024 POSTOP FOLLOW-UP VISIT: CPT | Performed by: NEUROLOGICAL SURGERY

## 2025-03-21 NOTE — PROGRESS NOTES
Patient: Jaylan Moran  : 1970    Primary Care Provider: Symone Sanchez MD    Requesting Provider: As above        History    Chief Complaint: Left flank and abdominal pain.    History of Present Illness: Mr. Moran is a 54-year-old gentleman is well-known to my service. He underwent right carpal tunnel release in 2016. In 2017 he underwent ACDF C4-7. He is also progressive back and lower extremity pain and ultimately on 2021 underwent decompression from L2-5.  Given recurrent stenosis and instability he underwent additional decompression with fusion stabilization at L2-3 on 2025.  Operatively he has had some numbness in the right thigh.  He did well for about 3 weeks following surgery when he developed pain in his left lower abdomen extending in the left inguinal region.  Has had a heaviness or sore feeling in his left testicle.  He underwent urology evaluation which was unremarkable.  He has recently undergone additional evaluation of his back.  He has no left thigh symptoms.  The testicular symptoms have waned.  Most of the pain is in his left abdomen and left flank.  He is taking 1 pain pill or 1 Valium daily.  He thinks the Lyrica may be helping.  His pain is not absent but it is considerably better than it was a couple of weeks ago.  When he twists or changes position he does get that sharp pain in his flank.    Review of Systems   Constitutional:  Negative for activity change, appetite change, chills, diaphoresis, fatigue, fever and unexpected weight change.   HENT:  Negative for congestion, dental problem, drooling, ear discharge, ear pain, facial swelling, hearing loss, mouth sores, nosebleeds, postnasal drip, rhinorrhea, sinus pressure, sinus pain, sneezing, sore throat, tinnitus, trouble swallowing and voice change.    Eyes:  Negative for photophobia, pain, discharge, redness, itching and visual disturbance.   Respiratory:  Negative for apnea, cough, choking, chest  "tightness, shortness of breath, wheezing and stridor.    Cardiovascular:  Negative for chest pain, palpitations and leg swelling.   Gastrointestinal:  Negative for abdominal distention, abdominal pain, anal bleeding, blood in stool, constipation, diarrhea, nausea, rectal pain and vomiting.   Endocrine: Negative for cold intolerance, heat intolerance, polydipsia, polyphagia and polyuria.   Genitourinary:  Positive for flank pain. Negative for decreased urine volume, difficulty urinating, dysuria, enuresis, frequency, genital sores, hematuria and urgency.   Musculoskeletal:  Negative for arthralgias, back pain, gait problem, joint swelling, myalgias, neck pain and neck stiffness.   Skin:  Negative for color change, pallor, rash and wound.   Allergic/Immunologic: Negative for environmental allergies, food allergies and immunocompromised state.   Neurological:  Negative for dizziness, tremors, seizures, syncope, facial asymmetry, speech difficulty, weakness, light-headedness, numbness and headaches.   Hematological:  Negative for adenopathy. Does not bruise/bleed easily.   Psychiatric/Behavioral:  Negative for agitation, behavioral problems, confusion, decreased concentration, dysphoric mood, hallucinations, self-injury, sleep disturbance and suicidal ideas. The patient is not nervous/anxious and is not hyperactive.      The patient's past medical history, past surgical history, family history, and social history have been reviewed at length in the electronic medical record.      Physical Exam:   Temp 97.9 °F (36.6 °C) (Temporal)   Ht 177.8 cm (70\")   Wt 112 kg (246 lb)   BMI 35.30 kg/m²   Deferred    Medical Decision Making    Data Review:   (All imaging studies were personally reviewed unless stated otherwise)  MRI of the lumbar spine demonstrates extensive postsurgical artifact and not much can be said.     Lumbar CT myelogram demonstrates a filling defect on the left at L2-3.  I am not sure whether this is due to " some retropulsed bone graft or potentially to disc herniation.  His cages are in good position as are his pedicle screws.     Thoracic MRI study demonstrates diffuse degenerative change.  I do not see root compromise on the left at any level.  The radiologist commented on stenosis at the T9-10 level with some possible signal change within the cord.  If there is stenosis it is mild to moderate.  I am not completely convinced that there is any signal change within the cord.  I discussed these findings with the patient and his wife.    Diagnosis:   Left flank and abdominal pain.  The patient was having some testicular and thigh pain on the left but that is actually improved.  Overall he seems to be a little better.    Treatment Options:   The patient is going to take ibuprofen 600 mg twice a day and continue his Lyrica.  He will work off of the Valium and pain medicine.  He will follow-up in 2 weeks.  If his symptoms persist or worsen then we will need to explore the left L2-3 level.  Again, this does not correspond particularly well with flank pain.          I, Dr. Escobar, personally performed the services described in the documentation, as scribed in my presence, and it is both accurate and complete.

## 2025-04-04 ENCOUNTER — OFFICE VISIT (OUTPATIENT)
Dept: NEUROSURGERY | Facility: CLINIC | Age: 55
End: 2025-04-04
Payer: COMMERCIAL

## 2025-04-04 VITALS — WEIGHT: 248 LBS | TEMPERATURE: 98 F | RESPIRATION RATE: 18 BRPM | BODY MASS INDEX: 35.5 KG/M2 | HEIGHT: 70 IN

## 2025-04-04 DIAGNOSIS — Z98.1 S/P LUMBAR FUSION: Primary | ICD-10-CM

## 2025-04-04 DIAGNOSIS — M54.16 LUMBAR RADICULOPATHY: ICD-10-CM

## 2025-04-04 PROCEDURE — 99024 POSTOP FOLLOW-UP VISIT: CPT | Performed by: NEUROLOGICAL SURGERY

## 2025-04-04 RX ORDER — OMEPRAZOLE 20 MG/1
1 TABLET, DELAYED RELEASE ORAL DAILY
COMMUNITY

## 2025-04-04 NOTE — PROGRESS NOTES
Patient: Jaylan Moran  : 1970    Primary Care Provider: Symone Sanchez MD    Requesting Provider: As above        History    Chief Complaint: Left flank and abdominal pain.    History of Present Illness: Mr. Moran is a 54-year-old gentleman is well-known to my service. He underwent right carpal tunnel release in 2016. In 2017 he underwent ACDF C4-7. He is also progressive back and lower extremity pain and ultimately on 2021 underwent decompression from L2-5. Given recurrent stenosis and instability he underwent additional decompression with fusion stabilization at L2-3 on 2025.  Postoperatively he has had some numbness in the right thigh. He did well for about 3 weeks following surgery when he developed pain in his left lower abdomen extending in the left inguinal region. Has had a heaviness or sore feeling in his left testicle. He underwent urology evaluation which was unremarkable. He has recently undergone additional evaluation of his back. He has no left thigh symptoms. The testicular symptoms have waned.  His left abdominal and flank pain has resolved.  It may be time or it may be the Lyrica but he is better.  He has some modest low back pain.    Review of Systems   Constitutional:  Negative for activity change, appetite change, chills, diaphoresis, fatigue, fever and unexpected weight change.   HENT:  Negative for congestion, dental problem, drooling, ear discharge, ear pain, facial swelling, hearing loss, mouth sores, nosebleeds, postnasal drip, rhinorrhea, sinus pressure, sneezing, sore throat, tinnitus, trouble swallowing and voice change.    Eyes:  Negative for photophobia, pain, discharge, redness, itching and visual disturbance.   Respiratory:  Negative for apnea, cough, choking, chest tightness, shortness of breath, wheezing and stridor.    Cardiovascular:  Negative for chest pain, palpitations and leg swelling.   Gastrointestinal:  Negative for abdominal distention,  "abdominal pain, anal bleeding, blood in stool, constipation, diarrhea, nausea, rectal pain and vomiting.   Endocrine: Negative for cold intolerance, heat intolerance, polydipsia, polyphagia and polyuria.   Genitourinary:  Negative for decreased urine volume, difficulty urinating, dysuria, enuresis, flank pain, frequency, genital sores, hematuria and urgency.   Musculoskeletal:  Negative for arthralgias, back pain, gait problem, joint swelling, myalgias, neck pain and neck stiffness.   Skin:  Negative for color change, pallor, rash and wound.   Allergic/Immunologic: Negative for environmental allergies, food allergies and immunocompromised state.   Neurological:  Negative for dizziness, tremors, seizures, syncope, facial asymmetry, speech difficulty, weakness, light-headedness, numbness and headaches.   Hematological:  Negative for adenopathy. Does not bruise/bleed easily.   Psychiatric/Behavioral:  Negative for agitation, behavioral problems, confusion, decreased concentration, dysphoric mood, hallucinations, self-injury, sleep disturbance and suicidal ideas. The patient is not nervous/anxious and is not hyperactive.    All other systems reviewed and are negative.      The patient's past medical history, past surgical history, family history, and social history have been reviewed at length in the electronic medical record.      Physical Exam:   Temp 98 °F (36.7 °C) (Infrared)   Resp 18   Ht 177.8 cm (70\")   Wt 112 kg (248 lb)   BMI 35.58 kg/m²   The patient appears much more comfortable and is in good spirits.    Medical Decision Making    Diagnosis:   1.  Left flank and abdominal pain, improved.  2.  History of L2-5 decompression on 2/11/2021.  3.  History of L2-3 fusion and stabilization 1/9/2025.    Treatment Options:   Mr. Moran is doing better.  I have referred him to physical therapy.  He will slowly increase his activity.  We will continue the Lyrica.  He will call when a refill is needed.  He will " follow-up in our clinic in 2 months with new plain films of the lumbar spine.      Scribed for Morgan Escobar MD by Radha Layne CMA on 4/4/2025 12:37 EDT       I, Dr. Escobar, personally performed the services described in the documentation, as scribed in my presence, and it is both accurate and complete.

## 2025-04-07 ENCOUNTER — TELEPHONE (OUTPATIENT)
Dept: NEUROSURGERY | Facility: CLINIC | Age: 55
End: 2025-04-07
Payer: COMMERCIAL

## 2025-04-07 NOTE — TELEPHONE ENCOUNTER
Provider:  Shawn  Surgery/Procedure:  LUMBAR FUSION DECOMPRESSON WITH PEDICLE SCREWS L2-3   Surgery/Procedure Date:  1-9-25  Last visit:   Office Visit with Morgan Escobar MD (04/04/2025)   Next visit: 6-6-25     Reason for call: the patient called and forgot to ask Dr. Escobar if being 3 months out of surgery if he could mow on his [place] turn and weed eat?  Would this be to much at the time?  Please Advise. Thank you.

## 2025-04-07 NOTE — TELEPHONE ENCOUNTER
I spoke with the patient and answered his questions.   He is going to be starting PT, after surgery he was having a lot of abdominal pain, and wasn't doing much. He has had relief from this . HIs yard is about an acre and takes about 45 min. He may mow, though he is encouraged to break it up into sections at first.   He is cautioned against using his string teresita for at least another month until he has done some conditioning and core strengthening with PT. Again, increase this activity very slowly

## 2025-06-02 ENCOUNTER — HOSPITAL ENCOUNTER (OUTPATIENT)
Dept: GENERAL RADIOLOGY | Facility: HOSPITAL | Age: 55
Discharge: HOME | End: 2025-06-02
Admitting: NEUROLOGICAL SURGERY
Payer: COMMERCIAL

## 2025-06-02 DIAGNOSIS — Z98.1 S/P LUMBAR FUSION: ICD-10-CM

## 2025-06-02 PROCEDURE — 72100 X-RAY EXAM L-S SPINE 2/3 VWS: CPT

## 2025-06-06 ENCOUNTER — OFFICE VISIT (OUTPATIENT)
Dept: NEUROSURGERY | Facility: CLINIC | Age: 55
End: 2025-06-06
Payer: COMMERCIAL

## 2025-06-06 VITALS — HEIGHT: 70 IN | TEMPERATURE: 97.8 F | BODY MASS INDEX: 35.22 KG/M2 | WEIGHT: 246 LBS

## 2025-06-06 DIAGNOSIS — M47.22 CERVICAL SPONDYLOSIS WITH RADICULOPATHY: Primary | ICD-10-CM

## 2025-06-06 DIAGNOSIS — M48.062 SPINAL STENOSIS, LUMBAR REGION, WITH NEUROGENIC CLAUDICATION: ICD-10-CM

## 2025-06-06 DIAGNOSIS — M54.16 LUMBAR RADICULOPATHY: ICD-10-CM

## 2025-06-06 NOTE — PROGRESS NOTES
Patient: Jaylan Moran  : 1970  Chart #: [id number]    Date of Service: 2025    CHIEF COMPLAINT:   1.  Right thigh numbness  2.  Neck pain    History of Present Illness Mr. Moran is seen in follow-up.  He is a 54-year-old gentleman who is well-known to Dr. Escobar service.  He underwent carpal tunnel release in .  In  he underwent ACDF C4-7.  On 2021 he underwent decompression from L2-5.  Given recurrent stenosis and instability, on 2025 he underwent decompression with fusion and stabilization at L2-3.  Since surgery he has had some numbness in the right anterior thigh.  Additionally he had a bout of pain involving the left lower abdomen extending into the left inguinal region.  That has all gone away.  He continues with constant numbness in the right thigh.  If he leans to the right the numbness will get worse.  No symptoms below the knee.  He ran out of Lyrica several weeks ago.  He does not feel like he particularly needs it.  He is participating in physical therapy.    He also complains of increased neck pain that extends into the right trapezius area for the past 3 weeks.  No symptoms further distally.      Past Medical History:   Diagnosis Date    Arthritis     Arthritis of back     Arthritis of neck     Had surgery    Back problem     Carpal tunnel syndrome     RIGHT    Cervical disc disorder     Had surgery    Claustrophobia     GERD (gastroesophageal reflux disease)     History of anemia     Low back pain     Low back strain     Lumbosacral disc disease 2010    Neck strain     Sleep apnea     mouth guard    Tear of meniscus of knee 2024    Knee scope performed    Thoracic disc disorder     Wears glasses          Current Outpatient Medications:     ibuprofen (ADVIL,MOTRIN) 600 MG tablet, Take 1 tablet by mouth Every 6 (Six) Hours As Needed for Moderate Pain., Disp: 90 tablet, Rfl: 0    Multiple Vitamins-Minerals (MULTIVITAL PO), Take 1  tablet by mouth Daily., Disp: , Rfl:     omeprazole OTC (PriLOSEC OTC) 20 MG EC tablet, Take 1 tablet by mouth Daily., Disp: , Rfl:     Past Surgical History:   Procedure Laterality Date    ANTERIOR CERVICAL DISCECTOMY W/ FUSION N/A 05/18/2017    Procedure: CERVICAL DISCECTOMY ANTERIOR WITH FUSION C4-7;  Surgeon: Morgan Escobar MD;  Location:  [facility];  Service:     BACK SURGERY  02/08/2021    laminectomy    CARPAL TUNNEL RELEASE Right 09/01/2016    Procedure: RIGHT CARPAL TUNNEL RELEASE;  Surgeon: Morgan Escobar MD;  Location:  [facility];  Service:     COLONOSCOPY      CYSTOSCOPY TRANSURETHRAL RESECTION OF PROSTATE N/A 11/19/2021    Procedure: CYSTOSCOPY, THULIUM LASER ABLATION OF PROSTATE;  Surgeon: Abdelrahman Calero MD;  Location:  [facility];  Service: Urology;  Laterality: N/A;    ENDOSCOPY      INTERVENTIONAL RADIOLOGY PROCEDURE N/A 3/12/2025    Procedure: IR myelogram, lumbar;  Surgeon: Vincent Elder MD;  Location:  [facility];  Service: Interventional Radiology;  Laterality: N/A;  monday    KNEE ARTHROSCOPY Right 11/22/2024    partially torn meniscus    LUMBAR LAMINECTOMY DISCECTOMY DECOMPRESSION N/A 02/11/2021    Procedure: LUMBAR LAMINECTOMY DISCECTOMY DECOMPRESSION POSTERIOR L2-5;  Surgeon: Morgan Escobar MD;  Location:  [facility];  Service: Neurosurgery;  Laterality: N/A;    LUMBAR LAMINECTOMY WITH FUSION N/A 01/09/2025    Procedure: LUMBAR FUSION DECOMPRESSON WITH PEDICLE SCREWS L2-3;  Surgeon: Morgan Escobar MD;  Location:  [facility];  Service: Neurosurgery;  Laterality: N/A;    NASAL SEPTUM SURGERY      NECK SURGERY  5/2016    OTHER SURGICAL HISTORY  12/11/2023    L2-5 Intracept procedure- Dr. Hai Botello    SHOULDER SURGERY Right 12/10/2021    shoulder arthroscopy with RCR, biceps tenodesis - JRT    VASECTOMY      Dont remember actual date.       Social History     Socioeconomic History    Marital status:    Tobacco Use    Smoking status: Never     Passive  exposure: Never    Smokeless tobacco: Never   Vaping Use    Vaping status: Never Used   Substance and Sexual Activity    Alcohol use: Yes     Alcohol/week: 1.0 - 2.0 standard drink of alcohol     Types: 1 - 2 Cans of beer per week     Comment: occasionally    Drug use: No    Sexual activity: Yes     Partners: Female     Birth control/protection: None, Vasectomy     Comment: Had vasectomy         Review of Systems   Constitutional:  Negative for activity change, appetite change, chills, diaphoresis, fatigue, fever and unexpected weight change.   HENT:  Negative for congestion, dental problem, drooling, ear discharge, ear pain, facial swelling, hearing loss, mouth sores, nosebleeds, postnasal drip, rhinorrhea, sinus pressure, sneezing, sore throat, tinnitus, trouble swallowing and voice change.    Eyes:  Negative for photophobia, pain, discharge, redness, itching and visual disturbance.   Respiratory:  Negative for apnea, cough, choking, chest tightness, shortness of breath, wheezing and stridor.    Cardiovascular:  Negative for chest pain, palpitations and leg swelling.   Gastrointestinal:  Negative for abdominal distention, abdominal pain, anal bleeding, blood in stool, constipation, diarrhea, nausea, rectal pain and vomiting.   Endocrine: Negative for cold intolerance, heat intolerance, polydipsia, polyphagia and polyuria.   Genitourinary:  Negative for decreased urine volume, difficulty urinating, dysuria, enuresis, flank pain, frequency, genital sores, hematuria and urgency.   Musculoskeletal:  Negative for arthralgias, back pain, gait problem, joint swelling, myalgias, neck pain and neck stiffness.   Skin:  Negative for color change, pallor, rash and wound.   Allergic/Immunologic: Negative for environmental allergies, food allergies and immunocompromised state.   Neurological:  Negative for dizziness, tremors, seizures, syncope, facial asymmetry, speech difficulty, weakness, light-headedness, numbness and  "headaches.   Hematological:  Negative for adenopathy. Does not bruise/bleed easily.   Psychiatric/Behavioral:  Negative for agitation, behavioral problems, confusion, decreased concentration, dysphoric mood, hallucinations, self-injury, sleep disturbance and suicidal ideas. The patient is not nervous/anxious and is not hyperactive.    All other systems reviewed and are negative.      Objective   Vital Signs: Temperature 97.8 °F (36.6 °C), temperature source Infrared, height 177.8 cm (70\"), weight 112 kg (246 lb).  Physical Exam  Vitals and nursing note reviewed.   Constitutional:       General: He is not in acute distress.     Appearance: He is well-developed.   Psychiatric:         Behavior: Behavior normal.         Thought Content: Thought content normal.     Musculoskeletal:     Strength is intact in upper and lower extremities to direct testing.     Station and gait are normal.  Neurologic:     Muscle tone is normal throughout.     Patient is oriented to person, place, and time.         Independent review of radiographic imaging: Plain films of the lumbar spine dated 6/2/2025 demonstrate construct to be intact with good alignment L2-3.  I reviewed an MRI of the lumbar spine from 3/2/2025.  There is some foraminal narrowing below the fusion at L3-4 that may be compromising the L3 nerve.    Assessment & Plan   Diagnosis:  1.  Possible right L3 radiculopathy  2.  Status post L2-3 decompression, fusion and stabilization, 1/9/2025  3.  History of laminectomies L2-5, 2/11/2021  4.  Neck pain  5.  History of ACDF C4-7, 2017    Medical Decision Making: Patient presents with symptoms that may be referable to narrowing below his recent fusion at L3-4.  He is not experiencing much in the way of pain; it is more numbness.  We will continue to monitor.  He is participating in physical therapy and I am going to add on some therapy for his new neck pain.  If symptoms become more bothersome then we can restart his Lyrica.  He " will notify our office.  Otherwise I will plan to see him back in 2.5 months.    Diagnoses and all orders for this visit:    1. Cervical spondylosis with radiculopathy (Primary)  -     Ambulatory Referral to Physical Therapy for Evaluation & Treatment  -     XR Spine Cervical 2 View; Future    2. Spinal stenosis, lumbar region, with neurogenic claudication  -     Ambulatory Referral to Physical Therapy for Evaluation & Treatment  -     XR Spine Cervical 2 View; Future    3. Lumbar radiculopathy  -     Ambulatory Referral to Physical Therapy for Evaluation & Treatment  -     XR Spine Cervical 2 View; Future                                  Ivette Henry PA-C  Patient Care Team:  Symone Sanchez MD as PCP - General (Internal Medicine)  Symone Sanchez MD as Referring Physician (Internal Medicine)  Najma Pitt PA-C as Physician Assistant (Physician Assistant)

## 2025-06-24 ENCOUNTER — APPOINTMENT (OUTPATIENT)
Dept: GENERAL RADIOLOGY | Facility: HOSPITAL | Age: 55
End: 2025-06-24
Payer: COMMERCIAL

## 2025-06-24 ENCOUNTER — HOSPITAL ENCOUNTER (EMERGENCY)
Facility: HOSPITAL | Age: 55
Discharge: HOME | End: 2025-06-24
Attending: EMERGENCY MEDICINE | Admitting: EMERGENCY MEDICINE
Payer: COMMERCIAL

## 2025-06-24 VITALS
HEIGHT: 70 IN | SYSTOLIC BLOOD PRESSURE: 138 MMHG | DIASTOLIC BLOOD PRESSURE: 89 MMHG | WEIGHT: 240 LBS | OXYGEN SATURATION: 96 % | BODY MASS INDEX: 34.36 KG/M2 | TEMPERATURE: 98.6 F | RESPIRATION RATE: 16 BRPM | HEART RATE: 72 BPM

## 2025-06-24 DIAGNOSIS — S43.402A SPRAIN OF LEFT SHOULDER, UNSPECIFIED SHOULDER SPRAIN TYPE, INITIAL ENCOUNTER: Primary | ICD-10-CM

## 2025-06-24 DIAGNOSIS — M54.2 NECK PAIN: ICD-10-CM

## 2025-06-24 PROCEDURE — 99283 EMERGENCY DEPT VISIT LOW MDM: CPT

## 2025-06-24 PROCEDURE — 72040 X-RAY EXAM NECK SPINE 2-3 VW: CPT

## 2025-06-24 PROCEDURE — 73030 X-RAY EXAM OF SHOULDER: CPT

## 2025-06-24 NOTE — ED PROVIDER NOTES
Subjective   History of Present Illness  Pt is a 55 yo male presenting to ED with complaints of arm pain. PMHx significant for GERD, Anemia, CBP and NEERAJ. Pt explains he was lifting boxes today and had sudden onset of left shoulder pain that sent pain shooting down upper arm with movement. He has kofi and difficult raising left arm. He has had intermittent numbness to bilateral UE for several weeks and Dr. Escobar had ordered outpatient cervical xrays since he has had prior surgery but hasn't had xray yet. He denies new numbness or weakness since the shoulder injury. He has had rotator cuff repair on right arm in the past with Dr. Win. Denies fever, CP or SOB. He uses ETOH but denies tobacco use.     History provided by:  Patient and medical records      Review of Systems   Constitutional:  Negative for fever.   Respiratory:  Negative for shortness of breath.    Cardiovascular:  Negative for chest pain.   Genitourinary:  Negative for difficulty urinating.   Musculoskeletal:  Positive for arthralgias and neck pain. Negative for back pain and joint swelling.   Skin:  Negative for wound.   Neurological:  Positive for numbness (bilateral arms). Negative for dizziness, syncope, weakness and headaches.       Past Medical History:   Diagnosis Date    Arthritis     Arthritis of back 2010    Arthritis of neck 2016    Had surgery    Back problem     Carpal tunnel syndrome     RIGHT    Cervical disc disorder 2021    Had surgery    Claustrophobia     GERD (gastroesophageal reflux disease)     History of anemia     Low back pain     Low back strain 2010    Lumbosacral disc disease 2010    Neck strain 2016    Sleep apnea     mouth guard    Tear of meniscus of knee 11/22/2024    Knee scope performed    Thoracic disc disorder     Wears glasses        Allergies   Allergen Reactions    Penicillins Other (See Comments) and Unknown - Low Severity     SINCE INFANCY       Past Surgical History:   Procedure Laterality Date    ANTERIOR  CERVICAL DISCECTOMY W/ FUSION N/A 05/18/2017    Procedure: CERVICAL DISCECTOMY ANTERIOR WITH FUSION C4-7;  Surgeon: Morgan Escobar MD;  Location:  [facility];  Service:     BACK SURGERY  02/08/2021    laminectomy    CARPAL TUNNEL RELEASE Right 09/01/2016    Procedure: RIGHT CARPAL TUNNEL RELEASE;  Surgeon: Morgan Escobar MD;  Location:  [facility];  Service:     COLONOSCOPY      CYSTOSCOPY TRANSURETHRAL RESECTION OF PROSTATE N/A 11/19/2021    Procedure: CYSTOSCOPY, THULIUM LASER ABLATION OF PROSTATE;  Surgeon: Abdelrahman Calero MD;  Location:  [facility];  Service: Urology;  Laterality: N/A;    ENDOSCOPY      INTERVENTIONAL RADIOLOGY PROCEDURE N/A 3/12/2025    Procedure: IR myelogram, lumbar;  Surgeon: Vincnet Elder MD;  Location:  [facility];  Service: Interventional Radiology;  Laterality: N/A;  monday    KNEE ARTHROSCOPY Right 11/22/2024    partially torn meniscus    LUMBAR LAMINECTOMY DISCECTOMY DECOMPRESSION N/A 02/11/2021    Procedure: LUMBAR LAMINECTOMY DISCECTOMY DECOMPRESSION POSTERIOR L2-5;  Surgeon: Morgan Escobar MD;  Location:  [facility];  Service: Neurosurgery;  Laterality: N/A;    LUMBAR LAMINECTOMY WITH FUSION N/A 01/09/2025    Procedure: LUMBAR FUSION DECOMPRESSON WITH PEDICLE SCREWS L2-3;  Surgeon: Morgan Escobar MD;  Location:  [facility];  Service: Neurosurgery;  Laterality: N/A;    NASAL SEPTUM SURGERY      NECK SURGERY  5/2016    OTHER SURGICAL HISTORY  12/11/2023    L2-5 Intracept procedure- Dr. Hai Botello    SHOULDER SURGERY Right 12/10/2021    shoulder arthroscopy with RCR, biceps tenodesis - JRT    VASECTOMY      Dont remember actual date.       Family History   Problem Relation Age of Onset    Diabetes Mother     Hypertension Mother     COPD Mother     Arthritis Mother     Heart disease Mother     Heart disease Brother     COPD Brother     Arthritis Brother     COPD Father        Social History     Socioeconomic History    Marital status:    Tobacco Use     Smoking status: Never     Passive exposure: Never    Smokeless tobacco: Never   Vaping Use    Vaping status: Never Used   Substance and Sexual Activity    Alcohol use: Yes     Alcohol/week: 1.0 - 2.0 standard drink of alcohol     Types: 1 - 2 Cans of beer per week     Comment: occasionally    Drug use: No    Sexual activity: Yes     Partners: Female     Birth control/protection: None, Vasectomy     Comment: Had vasectomy           Objective   Physical Exam  Vitals and nursing note reviewed.   Constitutional:       General: He is not in acute distress.  HENT:      Head: Atraumatic.      Nose: Nose normal.   Eyes:      Conjunctiva/sclera: Conjunctivae normal.   Cardiovascular:      Rate and Rhythm: Normal rate.      Pulses: Normal pulses.   Pulmonary:      Effort: Pulmonary effort is normal. No respiratory distress.   Musculoskeletal:      Left shoulder: Tenderness present. No deformity. Decreased range of motion. Normal strength. Normal pulse.      Left elbow: Normal range of motion. No tenderness.      Cervical back: Normal range of motion and neck supple. No tenderness. Normal range of motion.   Skin:     General: Skin is warm.      Capillary Refill: Capillary refill takes less than 2 seconds.   Neurological:      General: No focal deficit present.      Mental Status: He is alert and oriented to person, place, and time.      Sensory: No sensory deficit.      Motor: No weakness.   Psychiatric:         Mood and Affect: Mood normal.         Behavior: Behavior normal.         Procedures           ED Course  ED Course as of 06/24/25 1940 Tue Jun 24, 2025 1840 Discussed results and tx plan with patient. He will f/u with PCP and Ortho.  [RT]   1840 I personally and independently reviewed left shoulder and C spine and agree with the radiology interpretation specifically no acute findings.  [RT]      ED Course User Index  [RT] Cristina Romeo PA      No results found for this or any previous visit (from the past 24  "hours).  Note: In addition to lab results from this visit, the labs listed above may include labs taken at another facility or during a different encounter within the last 24 hours. Please correlate lab times with ED admission and discharge times for further clarification of the services performed during this visit.    XR Shoulder 2+ View Left   Final Result   Impression:   Mild degenerative osteoarthritis without acute osseous abnormality.         Electronically Signed: Kameron Puckett MD     6/24/2025 6:27 PM EDT     Workstation ID: [id number]      XR Spine Cervical 2 View   Final Result   Impression:   Postoperative changes from ACDF at C4-C7.   No acute osseous abnormality is identified on cervical spine x-rays.            Electronically Signed: Allison Vazquez MD     6/24/2025 6:30 PM EDT     Workstation ID: [id number]        Vitals:    06/24/25 1742   BP: 138/89   BP Location: Left arm   Patient Position: Sitting   Pulse: 72   Resp: 16   Temp: 98.6 °F (37 °C)   TempSrc: Oral   SpO2: 96%   Weight: 109 kg (240 lb)   Height: 177.8 cm (70\")     Medications - No data to display  ECG/EMG Results (last 24 hours)       ** No results found for the last 24 hours. **          No orders to display                                                        Medical Decision Making  Pt is a 53 yo male presenting to ED with complaints of left shoulder pain. I had a discussion with the patient / family regarding ED course, diagnosis, diagnostic results and treatment plan including medications and admission / discharge. Discussed if new or worse symptoms / concerns to return to ED for further evaluation. Discussed need for close follow up with PCP / specialists.    DDx  Fracture, Sprain, Dislocation, Biceps tear, Cervical injury    Problems Addressed:  Neck pain: complicated acute illness or injury  Sprain of left shoulder, unspecified shoulder sprain type, initial encounter: complicated acute illness or injury    Amount and/or " Complexity of Data Reviewed  External Data Reviewed: notes.     Details: Reviewed previous non ED visits including prior labs, imaging, available notes, medications, allergies and surgical hx.   6/6/25 Neurosurgery neck pain and leg numbness   Radiology: ordered. Decision-making details documented in ED Course.        Final diagnoses:   Sprain of left shoulder, unspecified shoulder sprain type, initial encounter   Neck pain       ED Disposition  ED Disposition       ED Disposition   Discharge    Condition   Stable    Comment   --               Symone Sanchez MD  [address] [place] [address]  [place]  [facility] [address] [phone]    Schedule an appointment as soon as possible for a visit       [facility] EMERGENCY DEPARTMENT  [address] [facility] [address] [phone]    If symptoms worsen         Medication List      No changes were made to your prescriptions during this visit.            Cristina Romeo PA  06/24/25 1940

## 2025-06-26 ENCOUNTER — OFFICE VISIT (OUTPATIENT)
Dept: ORTHOPEDIC SURGERY | Facility: CLINIC | Age: 55
End: 2025-06-26
Payer: COMMERCIAL

## 2025-06-26 VITALS
HEIGHT: 70 IN | DIASTOLIC BLOOD PRESSURE: 86 MMHG | BODY MASS INDEX: 34.36 KG/M2 | WEIGHT: 240 LBS | SYSTOLIC BLOOD PRESSURE: 138 MMHG

## 2025-06-26 DIAGNOSIS — M25.512 LEFT SHOULDER PAIN, UNSPECIFIED CHRONICITY: Primary | ICD-10-CM

## 2025-06-26 DIAGNOSIS — S46.212A BICEPS RUPTURE, PROXIMAL, LEFT, INITIAL ENCOUNTER: ICD-10-CM

## 2025-06-26 PROCEDURE — 99213 OFFICE O/P EST LOW 20 MIN: CPT | Performed by: PHYSICIAN ASSISTANT

## 2025-06-26 NOTE — PROGRESS NOTES
[facility] [place] Orthopaedic Surgery Clinic Note    Subjective     Chief Complaint   Patient presents with    Left Shoulder - Pain   DOI: 6/24/2025    HPI  Jaylan Moran is a 54 y.o. male.  Established patient presents for evaluation of left anterior shoulder pain.  SANDY: Pain/symptoms started after he lifted a box and felt a pop.  Now he cannot lift anything.    Pain scale: 6/10.  Severity of the pain moderate.  Quality of the pain burning, stabbing.  Associated symptoms pain.  Activity related to pain attempt at lifting, flexing elbow.  Pain eased by resting.  No reported numbness or tingling.  Prior treatments Aleve.    Denies fever, chills, night sweats or other constitutional symptoms.    Past Medical History:   Diagnosis Date    Arthritis     Arthritis of back 2010    Arthritis of neck 2016    Had surgery    Back problem     Carpal tunnel syndrome     RIGHT    Cervical disc disorder 2018    Had surgery    Claustrophobia     GERD (gastroesophageal reflux disease)     History of anemia     Low back pain     Low back strain 2010    Lumbosacral disc disease 2010    Surgery 2021 and 2025    Neck strain 2016    Rotator cuff syndrome 2021    Surgery right shoulder 11/2021    Sleep apnea     mouth guard    Tear of meniscus of knee 11/22/2024    Knee scope performed    Thoracic disc disorder     Wears glasses       Past Surgical History:   Procedure Laterality Date    ANTERIOR CERVICAL DISCECTOMY W/ FUSION N/A 05/18/2017    Procedure: CERVICAL DISCECTOMY ANTERIOR WITH FUSION C4-7;  Surgeon: Morgan Escobar MD;  Location:  [facility];  Service:     BACK SURGERY  02/08/2021    laminectomy    CARPAL TUNNEL RELEASE Right 09/01/2016    Procedure: RIGHT CARPAL TUNNEL RELEASE;  Surgeon: Morgan Escobar MD;  Location:  [facility];  Service:     COLONOSCOPY      CYSTOSCOPY TRANSURETHRAL RESECTION OF PROSTATE N/A 11/19/2021    Procedure: CYSTOSCOPY, THULIUM LASER ABLATION OF PROSTATE;  Surgeon: Abdelrahman Calero MD;  Location:   [facility];  Service: Urology;  Laterality: N/A;    ENDOSCOPY      INTERVENTIONAL RADIOLOGY PROCEDURE N/A 3/12/2025    Procedure: IR myelogram, lumbar;  Surgeon: Vincent Elder MD;  Location:  [facility];  Service: Interventional Radiology;  Laterality: N/A;  monday    KNEE ARTHROSCOPY Right 11/22/2024    partially torn meniscus    LUMBAR LAMINECTOMY DISCECTOMY DECOMPRESSION N/A 02/11/2021    Procedure: LUMBAR LAMINECTOMY DISCECTOMY DECOMPRESSION POSTERIOR L2-5;  Surgeon: Morgan Escobar MD;  Location:  [facility];  Service: Neurosurgery;  Laterality: N/A;    LUMBAR LAMINECTOMY WITH FUSION N/A 01/09/2025    Procedure: LUMBAR FUSION DECOMPRESSON WITH PEDICLE SCREWS L2-3;  Surgeon: Morgan Escobar MD;  Location:  [facility];  Service: Neurosurgery;  Laterality: N/A;    NASAL SEPTUM SURGERY      NECK SURGERY  5/2016    OTHER SURGICAL HISTORY  12/11/2023    L2-5 Intracept procedure- Dr. Hai Botello    SHOULDER SURGERY Right 12/10/2021    shoulder arthroscopy with RCR, biceps tenodesis - JRT    VASECTOMY      Dont remember actual date.      Family History   Problem Relation Age of Onset    Diabetes Mother     Hypertension Mother     COPD Mother     Arthritis Mother     Heart disease Mother     Heart disease Brother     COPD Brother     Arthritis Brother     COPD Father      Social History     Socioeconomic History    Marital status:    Tobacco Use    Smoking status: Never     Passive exposure: Never    Smokeless tobacco: Never   Vaping Use    Vaping status: Never Used   Substance and Sexual Activity    Alcohol use: Yes     Alcohol/week: 1.0 - 2.0 standard drink of alcohol     Types: 1 - 2 Cans of beer per week     Comment: occasionally    Drug use: No    Sexual activity: Yes     Partners: Female     Birth control/protection: None, Vasectomy     Comment: Had vasectomy      Current Outpatient Medications on File Prior to Visit   Medication Sig Dispense Refill    Multiple Vitamins-Minerals  "(MULTIVITAL PO) Take 1 tablet by mouth Daily.      omeprazole OTC (PriLOSEC OTC) 20 MG EC tablet Take 1 tablet by mouth Daily.      [DISCONTINUED] ibuprofen (ADVIL,MOTRIN) 600 MG tablet Take 1 tablet by mouth Every 6 (Six) Hours As Needed for Moderate Pain. 90 tablet 0     No current facility-administered medications on file prior to visit.      Allergies   Allergen Reactions    Penicillins Other (See Comments) and Unknown - Low Severity     SINCE INFANCY        The following portions of the patient's history were reviewed and updated as appropriate: allergies, current medications, past family history, past medical history, past social history, past surgical history, and problem list.    Review of Systems   Constitutional: Negative.    HENT: Negative.     Eyes: Negative.    Respiratory: Negative.     Cardiovascular: Negative.    Gastrointestinal: Negative.    Endocrine: Negative.    Genitourinary: Negative.    Musculoskeletal:  Positive for arthralgias.   Skin: Negative.    Allergic/Immunologic: Negative.    Neurological: Negative.    Hematological: Negative.    Psychiatric/Behavioral: Negative.          Objective      Physical Exam  /86   Ht 177.8 cm (70\")   Wt 109 kg (240 lb)   BMI 34.44 kg/m²     Body mass index is 34.44 kg/m².    GENERAL APPEARANCE: awake, alert & oriented x 3, in no acute distress and well developed, well nourished  PSYCH: normal mood and affect  LUNGS:  breathing nonlabored, no wheezing  EYES: sclera anicteric, pupils equal  CARDIOVASCULAR: palpable pulses. Capillary refill less than 2 seconds  INTEGUMENTARY: skin intact, no clubbing, cyanosis  NEUROLOGIC:  Normal Sensation       Ortho Exam  Left shoulder  Posture: Sitting on table with head forward and shoulders rounded.  Skin: Intact without any erythema, warmth or swelling.  Normal muscle tone and bulk.  Tenderness: Positive tenderness noted anterior shoulder/bicipital groove into bicep muscle belly with evidence of Antonio " deformity.    Motion: Range of motion limited of the shoulder today secondary to pain to the anterior aspect of the shoulder into biceps.    Rotator cuff: Erickson/Empty can mild discomfort. Drop arm negative.   Biceps: Speed's positive.  Deltoid: Intact  Strength: 4/5 SS, SubSc; 5/5 IS  Motor: Grossly intact to Ax/MSC/R/U/M/AIN/PIN  Sensory: Grossly intact to Ax/MSC/R/U/M nerve distributions.  Vascular: 2+ radial pulse with brisk capillary refill into each digit.      Imaging/Studies  Dr. Ventura and I reviewed plain film imaging from 6/24/2025.  XR SHOULDER 2+ VW LEFT     Date of Exam: 6/24/2025 5:59 PM EDT     Indication: pain, injury     Comparison: None available.     Findings:  Negative for fracture or joint malalignment. There is mild degenerative osteoarthritis of the glenohumeral and AC joint without significant undersurface spurring of the distal clavicle. No erosions or chondrocalcinosis. Cervical fusion hardware. Left   lung grossly clear.     IMPRESSION:  Impression:  Mild degenerative osteoarthritis without acute osseous abnormality.        Electronically Signed: Kameron Puckett MD    6/24/2025 6:27 PM EDT    Workstation ID: [id number]      Assessment/Plan        ICD-10-CM ICD-9-CM   1. Left shoulder pain, unspecified chronicity  M25.512 719.41   2. Biceps rupture, proximal, left, initial encounter  S46.212A 840.8       No orders of the defined types were placed in this encounter.       -Left shoulder pain due to proximal biceps rupture with evidence of Antonio deformity.  -Reviewed imaging.  -Encouraged gentle shoulder and elbow range of motion as tolerated by the patient.  -Sling for comfort, as needed.  -Recommend OTC NSAIDs/pain medication as needed.  -Follow up in 2 weeks for repeat evaluation.  -Questions and concerns answered.      Najma Pitt PA-C  06/26/25  20:54 EDT               EMR Dragon/Transcription disclaimer:  Much of this encounter note is an electronic transcription of spoken  language to printed text. Electronic transcription of spoken language may permit erroneous, or at times, nonsensical words or phrases to be inadvertently transcribed. Although I have reviewed the note for such errors, some may still exist.

## 2025-07-10 ENCOUNTER — OFFICE VISIT (OUTPATIENT)
Dept: ORTHOPEDIC SURGERY | Facility: CLINIC | Age: 55
End: 2025-07-10
Payer: COMMERCIAL

## 2025-07-10 VITALS
SYSTOLIC BLOOD PRESSURE: 148 MMHG | DIASTOLIC BLOOD PRESSURE: 82 MMHG | WEIGHT: 240 LBS | BODY MASS INDEX: 34.36 KG/M2 | HEIGHT: 70 IN

## 2025-07-10 DIAGNOSIS — S46.212D RUPTURE OF PROXIMAL BICEPS TENDON, LEFT, SUBSEQUENT ENCOUNTER: ICD-10-CM

## 2025-07-10 DIAGNOSIS — M75.102 ROTATOR CUFF SYNDROME OF LEFT SHOULDER: ICD-10-CM

## 2025-07-10 DIAGNOSIS — M25.512 LEFT SHOULDER PAIN, UNSPECIFIED CHRONICITY: Primary | ICD-10-CM

## 2025-07-10 PROCEDURE — 99213 OFFICE O/P EST LOW 20 MIN: CPT | Performed by: PHYSICIAN ASSISTANT

## 2025-07-10 RX ORDER — PREDNISONE 20 MG/1
TABLET ORAL
COMMUNITY
Start: 2025-07-08

## 2025-07-10 RX ORDER — DOXYCYCLINE 100 MG/1
CAPSULE ORAL
COMMUNITY
Start: 2025-07-08

## 2025-07-10 NOTE — PROGRESS NOTES
[facility] [place] [facility] [place]        Subjective     CC: Follow-up (2 week follow up --Left shoulder pain )      HPI    Jaylan Moran is a 55 y.o. male.  Patient returns today for follow-up left shoulder.  He continues to note pain specifically to the anterior aspect of the shoulder with radiation into the biceps tendon.  Again there appears to be some deformity noted to the biceps muscle.    Pain scale: 5/10.  Associated symptoms pain.  Pain is worse with any lifting or attempting curling of the arm.  Resting helps some.  Patient has been doing HEP since evaluation 6/26/2025 but has noted no improvement and increase in pain. He does have known C-spine and lumbar spine issues and will be following up with Dr. Escobar/neurosurgery 1 August.    Overall, patient's symptoms are as above.  Currently taking Aleve as needed.    ROS:    Constiutional:Pt denies fever, chills, nausea, or vomiting.  MSK:as above        Objective      Past Medical History  Past Medical History:   Diagnosis Date    Arthritis     Arthritis of back 2010    Arthritis of neck 2016    Had surgery    Back problem     Carpal tunnel syndrome     RIGHT    Cervical disc disorder 2018    Had surgery    Claustrophobia     GERD (gastroesophageal reflux disease)     History of anemia     Low back pain     Low back strain 2010    Lumbosacral disc disease 2010    Surgery 2021 and 2025    Neck strain 2016    Rotator cuff syndrome 2021    Surgery right shoulder 11/2021    Sleep apnea     mouth guard    Tear of meniscus of knee 11/22/2024    Knee scope performed    Thoracic disc disorder     Wears glasses      Social History     Socioeconomic History    Marital status:    Tobacco Use    Smoking status: Never     Passive exposure: Never    Smokeless tobacco: Never   Vaping Use    Vaping status: Never Used   Substance and Sexual Activity    Alcohol use: Yes     Alcohol/week: 1.0 - 2.0 standard drink of alcohol     Types: 1 - 2 Cans of beer  "per week     Comment: occasionally    Drug use: No    Sexual activity: Yes     Partners: Female     Birth control/protection: None, Vasectomy     Comment: Had vasectomy          Physical Exam  /82   Ht 177.8 cm (70\")   Wt 109 kg (240 lb)   BMI 34.44 kg/m²     Body mass index is 34.44 kg/m².    Patient is well nourished and well developed.        Ortho Exam  Left shoulder  Skin: Intact without any erythema, warmth or swelling.  Tenderness: Tenderness is noted predominantly at the anterior shoulder and along bicipital groove/muscle belly.  Positive Antonio deformity when compared to the contralateral side.      Motion: Forward flexion 140, abduction 100, ER 65.  Patient notes pain on range of motion of the shoulder.  Localizes pain anterior shoulder into anterior arm along biceps.  Rotator cuff: Erickson/Empty can positive. Drop arm negative.  Belly press positive. Bearhug positive.  Biceps: Speed's positive.  Deltoid: Intact  Strength: 4/5 SS, SubSc; 5/5 IS  Motor/sensory: Grossly intact C5-T1.      Imaging/Labs/EMG Reviewed and Interpreted:  No new imaging today.      Assessment:  1. Left shoulder pain, unspecified chronicity    2. Rotator cuff syndrome of left shoulder    3. Rupture of proximal biceps tendon, left, subsequent encounter        Plan:  Left shoulder pain due to rotator cuff syndrome, proximal biceps rupture.  Patient will continue working on range of motion to the shoulder and upper arm.  Based on history, exam patient sent for an MRI to further assess for rotator cuff tear (condition of tendons, extent of tears and repairability), presence/absence of muscle atrophy--will help with possible surgical planning.  Recommend OTC NSAIDs/pain medication as needed.  Follow up after MRI completed.  Questions and concerns answered.      Najma Pitt PA-C  07/11/25  07:51 EDT      Dictated Utilizing Dragon Dictation.  "

## 2025-07-17 ENCOUNTER — HOSPITAL ENCOUNTER (OUTPATIENT)
Dept: MRI IMAGING | Facility: HOSPITAL | Age: 55
Discharge: HOME | End: 2025-07-17
Admitting: PHYSICIAN ASSISTANT
Payer: COMMERCIAL

## 2025-07-17 DIAGNOSIS — M75.102 ROTATOR CUFF SYNDROME OF LEFT SHOULDER: ICD-10-CM

## 2025-07-17 DIAGNOSIS — S46.212D RUPTURE OF PROXIMAL BICEPS TENDON, LEFT, SUBSEQUENT ENCOUNTER: ICD-10-CM

## 2025-07-17 DIAGNOSIS — M25.512 LEFT SHOULDER PAIN, UNSPECIFIED CHRONICITY: ICD-10-CM

## 2025-07-17 PROCEDURE — 73221 MRI JOINT UPR EXTREM W/O DYE: CPT

## 2025-07-24 ENCOUNTER — OFFICE VISIT (OUTPATIENT)
Dept: ORTHOPEDIC SURGERY | Facility: CLINIC | Age: 55
End: 2025-07-24
Payer: COMMERCIAL

## 2025-07-24 VITALS
SYSTOLIC BLOOD PRESSURE: 152 MMHG | DIASTOLIC BLOOD PRESSURE: 80 MMHG | BODY MASS INDEX: 34.4 KG/M2 | WEIGHT: 240.3 LBS | HEIGHT: 70 IN

## 2025-07-24 DIAGNOSIS — M75.82 ROTATOR CUFF TENDONITIS, LEFT: ICD-10-CM

## 2025-07-24 DIAGNOSIS — M75.22 BICIPITAL TENDINITIS OF LEFT SHOULDER: ICD-10-CM

## 2025-07-24 DIAGNOSIS — S43.432A SUPERIOR GLENOID LABRUM LESION OF LEFT SHOULDER, INITIAL ENCOUNTER: ICD-10-CM

## 2025-07-24 DIAGNOSIS — M25.512 LEFT SHOULDER PAIN, UNSPECIFIED CHRONICITY: Primary | ICD-10-CM

## 2025-07-24 RX ADMIN — LIDOCAINE HYDROCHLORIDE 3 ML: 10 INJECTION, SOLUTION EPIDURAL; INFILTRATION; INTRACAUDAL; PERINEURAL at 15:11

## 2025-07-24 RX ADMIN — DEXAMETHASONE SODIUM PHOSPHATE 4 MG: 4 INJECTION, SOLUTION INTRA-ARTICULAR; INTRALESIONAL; INTRAMUSCULAR; INTRAVENOUS; SOFT TISSUE at 15:11

## 2025-07-24 NOTE — PROGRESS NOTES
Procedure   - Large Joint Arthrocentesis (Left biceps tendon sheath cortisone injection) on 7/24/2025 3:11 PM  Indications: pain  Details: 22 G needle, ultrasound-guided anterior approach  Medications: 3 mL lidocaine PF 1% 1 %; 4 mg dexAMETHasone 4 MG/ML  Outcome: tolerated well, no immediate complications  Procedure, treatment alternatives, risks and benefits explained, specific risks discussed. Consent was given by the patient. Immediately prior to procedure a time out was called to verify the correct patient, procedure, equipment, support staff and site/side marked as required. Patient was prepped and draped in the usual sterile fashion.        SHOULDER INJECTION: Risks and benefits of ultrasound-guided biceps tendon sheath corticosteroid injection were discussed and the patient desired to proceed. Verbal consent was obtained. The patient understood the risk of infection, potential skin changes, bump in blood glucose especially with diabetes, nerve injury, possibility of increased pain in the short term, and possible incomplete pain relief.  Using a linear probe, the biceps tendon sheath was identified with ultrasound guidance.  Under sterile technique, the space was injected from an anterior approach with 1mL of 4 mg dexAMETHasone 4 MG/ML and 3cc of lidocaine with aspiration prior to injection. The patient tolerated the procedure without difficulty.      Ana López PA-C

## 2025-07-24 NOTE — PROGRESS NOTES
"    [facility] [place] [facility] Note        Subjective     CC: Follow-up (MRI left shoulder 7/17/25)      HPI    Jaylan Moran is a 55 y.o. male.  Returns today for MRI follow-up of his left shoulder.  He continues to note pain to the anterior aspect of the shoulder with radiation into the biceps muscle.  There was concern for possible tendon rupture.    Pain scale: Again 5/5.  Notes pain with any attempted flexion of the elbow, lifting.    Overall, patient's symptoms are unchanged.    ROS:    Constiutional:Pt denies fever, chills, nausea, or vomiting.  MSK:as above        Objective      Past Medical History  Past Medical History:   Diagnosis Date    Arthritis     Arthritis of back 2010    Arthritis of neck 2016    Had surgery    Back problem     Carpal tunnel syndrome     RIGHT    Cervical disc disorder 2018    Had surgery    Claustrophobia     GERD (gastroesophageal reflux disease)     History of anemia     Low back pain     Low back strain 2010    Lumbosacral disc disease 2010    Surgery 2021 and 2025    Neck strain 2016    Rotator cuff syndrome 2021    Surgery right shoulder 11/2021    Sleep apnea     mouth guard    Tear of meniscus of knee 11/22/2024    Knee scope performed    Thoracic disc disorder     Wears glasses      Social History     Socioeconomic History    Marital status:    Tobacco Use    Smoking status: Never     Passive exposure: Never    Smokeless tobacco: Never   Vaping Use    Vaping status: Never Used   Substance and Sexual Activity    Alcohol use: Yes     Alcohol/week: 1.0 - 2.0 standard drink of alcohol     Types: 1 - 2 Cans of beer per week     Comment: occasionally    Drug use: No    Sexual activity: Yes     Partners: Female     Birth control/protection: None, Vasectomy     Comment: Had vasectomy          Physical Exam  /80   Ht 177.8 cm (70\")   Wt 109 kg (240 lb 4.8 oz)   BMI 34.48 kg/m²     Body mass index is 34.48 kg/m².    Patient is well nourished and well " developed.        Ortho Exam  Left shoulder  Skin: Intact without any erythema, warmth or swelling.  Tenderness: Positive anterior aspect of shoulder into bicipital groove/muscle belly.  Still appears to have some deformity to the bicep muscle but is not as pronounced as what it has been in the past.  Questionable as to whether this is related to inflammation.        Motion: Forward flexion 140, abduction 100, ER 65. IR L5.  Rotator cuff: Erickson/Empty can positive. Drop arm negative.  Belly press positive. Bearhug positive.  Biceps: Speed's positive.  Deltoid: Intact  Strength: 4/5 SS, SubSc; 5/5 IS  Motor/sensory: Grossly intact C5-T1.      Imaging/Labs/EMG Reviewed and Interpreted:  Reviewed MRI performed on 7/17/2025.  MRI SHOULDER LEFT WO CONTRAST     Date of Exam: 7/17/2025 12:12 PM EDT     Indication: Shoulder pain, rotator cuff disorder suspected, xray done  persistent anterior pain, HEP not helping.     Comparison: None available.     Technique:  Routine multiplanar/multisequence images of the left shoulder were obtained without contrast administration.          Findings:  Changes of tendinopathy are noted throughout the rotator cuff. There is evidence for superimposed partial-thickness bursal surface tearing of the distal anterior to middle fibers of the supraspinatus component.     There is also evidence for partial-thickness undersurface and intrasubstance tear involving the subscapularis component cuff. No definitive full-thickness rotator cuff tear is observed.     The biceps anchor remains intact. The tendon is identified in the expected position in the intertubercular sulcus. There is no definitive complete biceps tendon tear or distal retraction. There is abnormal signal in the proximal intra-articular biceps   tendon suggesting changes of tendinopathy and possible partial thickness tear.     There is associated abnormal signal which involves the superior labrum extending into the posterior labrum. The  findings appear to extend to the inferior margin the posterior labrum. There is also abnormal signal involving the anterior labrum. The   findings suggest changes of a SLAP type IX tear.     The glenohumeral joint is intact. Moderate changes of osteoarthritis are noted. There is a small joint effusion. Fluid extends into the biceps tendon sheath. No subacromial/subdeltoid bursal collection is seen. The acromioclavicular joint is intact.   Moderate hypertrophic age-related changes are noted. No abnormal bone marrow signal is seen. The cortical margins are grossly intact.     IMPRESSION:  Impression:  1.Changes of tendinopathy are noted throughout the rotator cuff. There is evidence for superimposed partial-thickness bursal surface tearing of the distal anterior to middle fibers of the supraspinatus component. There is also evidence for   partial-thickness undersurface and intrasubstance tear involving the subscapularis component. No definitive full-thickness rotator cuff tear is seen.  2.Evidence for tendinopathy and possible partial thickness tear of the proximal intra-articular biceps tendon. There is no definitive complete biceps tendon tear or distal retraction.  3.Evidence for a SLAP type IX tear.  4.Moderate osteoarthritis of the glenohumeral joint. Moderate hypertrophic age-related changes of the acromioclavicular joint are also noted.           Electronically Signed: Jose Reza MD    7/19/2025 2:28 PM EDT    Workstation ID: [id number]      Assessment:  1. Left shoulder pain, unspecified chronicity    2. Bicipital tendinitis of left shoulder    3. Superior glenoid labrum lesion of left shoulder, initial encounter    4. Rotator cuff tendonitis, left        Plan:  Left shoulder pain due to bicipital tendinitis, SLAP tear, rotator cuff tendinitis.  Reviewed imaging with the patient.  Offered and accepted ultrasound guided bicipital tendon corticosteroid injection.  Injection was given by Ana López  EMEKA.  Ordered formal PT.  Recommend OTC NSAIDs/pain medication as needed.  Follow up in 6 weeks for repeat evaluation.  Depending on patient's response to today's injection and PT may also consider subacromial injection to further isolate pain generators of the shoulder.  Questions and concerns answered.    See procedure note.    Najma Pitt PA-C  07/24/25  22:17 EDT      Dictated Utilizing Dragon Dictation.

## 2025-07-26 RX ORDER — LIDOCAINE HYDROCHLORIDE 10 MG/ML
3 INJECTION, SOLUTION EPIDURAL; INFILTRATION; INTRACAUDAL; PERINEURAL
Status: COMPLETED | OUTPATIENT
Start: 2025-07-24 | End: 2025-07-24

## 2025-07-26 RX ORDER — DEXAMETHASONE SODIUM PHOSPHATE 4 MG/ML
4 INJECTION, SOLUTION INTRA-ARTICULAR; INTRALESIONAL; INTRAMUSCULAR; INTRAVENOUS; SOFT TISSUE
Status: COMPLETED | OUTPATIENT
Start: 2025-07-24 | End: 2025-07-24

## 2025-08-13 ENCOUNTER — OFFICE VISIT (OUTPATIENT)
Dept: NEUROSURGERY | Facility: CLINIC | Age: 55
End: 2025-08-13
Payer: COMMERCIAL

## 2025-08-13 VITALS — HEIGHT: 70 IN | WEIGHT: 244 LBS | TEMPERATURE: 97.5 F | BODY MASS INDEX: 34.93 KG/M2

## 2025-08-13 DIAGNOSIS — M54.16 LUMBAR RADICULOPATHY: ICD-10-CM

## 2025-08-13 DIAGNOSIS — M47.22 CERVICAL SPONDYLOSIS WITH RADICULOPATHY: Primary | ICD-10-CM

## 2025-08-13 DIAGNOSIS — M48.062 SPINAL STENOSIS OF LUMBAR REGION WITH NEUROGENIC CLAUDICATION: ICD-10-CM

## 2025-08-19 ENCOUNTER — HOSPITAL ENCOUNTER (OUTPATIENT)
Facility: HOSPITAL | Age: 55
Discharge: HOME | End: 2025-08-19
Admitting: PHYSICIAN ASSISTANT
Payer: COMMERCIAL

## 2025-08-19 DIAGNOSIS — M47.22 CERVICAL SPONDYLOSIS WITH RADICULOPATHY: ICD-10-CM

## 2025-08-19 PROCEDURE — 72141 MRI NECK SPINE W/O DYE: CPT

## 2025-08-28 ENCOUNTER — OFFICE VISIT (OUTPATIENT)
Dept: ORTHOPEDIC SURGERY | Facility: CLINIC | Age: 55
End: 2025-08-28
Payer: COMMERCIAL

## 2025-08-28 VITALS
HEIGHT: 70 IN | SYSTOLIC BLOOD PRESSURE: 134 MMHG | BODY MASS INDEX: 34.93 KG/M2 | WEIGHT: 244 LBS | DIASTOLIC BLOOD PRESSURE: 88 MMHG

## 2025-08-28 DIAGNOSIS — M75.82 ROTATOR CUFF TENDONITIS, LEFT: ICD-10-CM

## 2025-08-28 DIAGNOSIS — S43.432A SUPERIOR GLENOID LABRUM LESION OF LEFT SHOULDER, INITIAL ENCOUNTER: ICD-10-CM

## 2025-08-28 DIAGNOSIS — M75.22 BICIPITAL TENDINITIS OF LEFT SHOULDER: ICD-10-CM

## 2025-08-28 DIAGNOSIS — M25.512 LEFT SHOULDER PAIN, UNSPECIFIED CHRONICITY: Primary | ICD-10-CM

## (undated) DEVICE — GLV SURG PREMIERPRO MIC LTX PF SZ7.5 BRN

## (undated) DEVICE — FIBR LASR SOLTIVE 550MICRON 1P/U

## (undated) DEVICE — CONN TBG Y 5 IN 1 LF STRL

## (undated) DEVICE — KT DRN EVAC WND PVC PCH WTROC RND 10F400

## (undated) DEVICE — SUT SILK 2/0 PS 18IN 1588H

## (undated) DEVICE — TRY L/P SFTY A/20G

## (undated) DEVICE — DRAINBAG,ANTI-REFLUX TOWER,L/F,2000ML,LL: Brand: MEDLINE

## (undated) DEVICE — TOOL 14MH30 LEGEND 14CM 3MM: Brand: MIDAS REX ™

## (undated) DEVICE — DRP CASSET 10 RAY LG 24X40

## (undated) DEVICE — SUT VIC 0 CTD BR 18IN UNDYE VCP724D

## (undated) DEVICE — STRIP,CLOSURE,WOUND,MEDI-STRIP,1/2X4: Brand: MEDLINE

## (undated) DEVICE — CANNULA,ADULT,SOFT-TOUCH,7TUBE,SC: Brand: MEDLINE

## (undated) DEVICE — DRILL BIT 7080513 STERILE 13MM

## (undated) DEVICE — PK CYSTO-TUR BASIC 10

## (undated) DEVICE — INSTRUMENT 7080902 PLATE HOLDING PIN

## (undated) DEVICE — MAGNETIC DRAPE: Brand: DEVON

## (undated) DEVICE — CATH URETRL FLXITP POLLACK STD 5F 70CM

## (undated) DEVICE — GOWN,NON-REINFORCED,SIRUS,SET IN SLV,XXL: Brand: MEDLINE

## (undated) DEVICE — HDRST INTUB GENTLETOUCH SLOT 7IN RT

## (undated) DEVICE — ENCORE® LATEX MICRO SIZE 7, STERILE LATEX POWDER-FREE SURGICAL GLOVE: Brand: ENCORE

## (undated) DEVICE — SUT PROLN 6/0 BV1 D/A 30IN 8709H

## (undated) DEVICE — SYR LUERLOK 30CC

## (undated) DEVICE — ENCORE® LATEX MICRO SIZE 6.5, STERILE LATEX POWDER-FREE SURGICAL GLOVE: Brand: ENCORE

## (undated) DEVICE — PACK,UNIVERSAL,NO GOWNS: Brand: MEDLINE

## (undated) DEVICE — PK NEURO DISC 10

## (undated) DEVICE — SUT SILK 2/0 TIES 18IN A185H

## (undated) DEVICE — Device

## (undated) DEVICE — CONTN SHRP CHEMO 2GL

## (undated) DEVICE — ANTIBACTERIAL UNDYED BRAIDED (POLYGLACTIN 910), SYNTHETIC ABSORBABLE SUTURE: Brand: COATED VICRYL

## (undated) DEVICE — SNAP KOVER: Brand: UNBRANDED

## (undated) DEVICE — MEDI-VAC NON-CONDUCTIVE SUCTION TUBING: Brand: CARDINAL HEALTH

## (undated) DEVICE — STRAP POSTN KN/BDY FM 5X72IN DISP

## (undated) DEVICE — TOOL MR8-14MH30 MR8 14CM MATCH 3MM: Brand: MIDAS REX MR8

## (undated) DEVICE — NDL SPINE 22G 31/2IN BLK

## (undated) DEVICE — JACKSON-PRATT 100CC BULB RESERVOIR: Brand: CARDINAL HEALTH

## (undated) DEVICE — TOOL 14BA60 LEGEND 14CM 6MM: Brand: MIDAS REX ™

## (undated) DEVICE — SHEET,DRAPE,40X58,STERILE: Brand: MEDLINE

## (undated) DEVICE — JACKSON TABLE POSITIONER KIT: Brand: MEDLINE INDUSTRIES, INC.

## (undated) DEVICE — BLANKT WARM UPPR/BDY ARM/OUT 57X196CM

## (undated) DEVICE — SUT VIC PLS CTD ANTIB BR 3/0 8/18IN 45CM

## (undated) DEVICE — JP PERF DRN SIL FLT 7MM FULL: Brand: CARDINAL HEALTH

## (undated) DEVICE — NEURO SPONGES: Brand: DEROYAL

## (undated) DEVICE — ELECTRD BLD EZ CLN MOD 4IN

## (undated) DEVICE — DRSNG WND GZ PAD BORDERED 4X8IN STRL

## (undated) DEVICE — CATH FOL BARDEX 2WY 20F 30CC

## (undated) DEVICE — ACCY PA700 LUBRICANT DIFFUSER MR7 4 PACK: Brand: MIDAS REX

## (undated) DEVICE — ENCORE® LATEX MICRO SIZE 7.5, STERILE LATEX POWDER-FREE SURGICAL GLOVE: Brand: ENCORE

## (undated) DEVICE — INSTRUMENT 875-088 14MM DSTRCT PIN STRL: Brand: MEDTRONIC REUSABLE INSTRUMENT

## (undated) DEVICE — GOWN,REINFORCE,POLY,SIRUS,BREATH SLV,XLG: Brand: MEDLINE

## (undated) DEVICE — MEDI-VAC YANKAUER SUCTION HANDLE W/BULBOUS TIP: Brand: CARDINAL HEALTH

## (undated) DEVICE — PATIENT RETURN ELECTRODE, SINGLE-USE, CONTACT QUALITY MONITORING, ADULT, WITH 9FT CORD, FOR PATIENTS WEIGING OVER 33LBS. (15KG): Brand: MEGADYNE

## (undated) DEVICE — TOOL 12BA30 LEGEND 12CM 3MM BALL: Brand: MIDAS REX

## (undated) DEVICE — SPHR MARKR STEALTH STATION

## (undated) DEVICE — GLV SURG BIOGEL LTX PF 8

## (undated) DEVICE — BLD KNIF KARLIN 46 3178

## (undated) DEVICE — KITTNER SPONGE: Brand: DEROYAL

## (undated) DEVICE — SKIN AFFIX SURG ADHESIVE 72/CS 0.55ML: Brand: MEDLINE

## (undated) DEVICE — ADHS LIQ MASTISOL 2/3ML

## (undated) DEVICE — ELECTRD BLD EDGE/INSUL1P 2.4X5.1MM STRL

## (undated) DEVICE — GLV SURG PREMIERPRO MIC LTX PF SZ6.5 BRN

## (undated) DEVICE — 3M™ WARMING BLANKET, UPPER BODY, 10 PER CASE, 42268: Brand: BAIR HUGGER™

## (undated) DEVICE — DRP MICROSCOP ELIMINATES GLAS COVR

## (undated) DEVICE — THE MILL DISPOSABLE - FINE

## (undated) DEVICE — IRRIGATOR BULB ASEPTO 60CC STRL

## (undated) DEVICE — C-ARM DRAPE: Brand: DEROYAL

## (undated) DEVICE — 3M™ STERI-DRAPE™ INSTRUMENT POUCH 1018: Brand: STERI-DRAPE™

## (undated) DEVICE — TRAP,MUCUS SPECIMEN,40CC: Brand: MEDLINE

## (undated) DEVICE — NDL SPINE 20G 3 1/2 YEL STRL 1P/U

## (undated) DEVICE — BANDAGE,GAUZE,BULKEE II,4.5"X4.1YD,STRL: Brand: MEDLINE

## (undated) DEVICE — DISPOSABLE IRRIGATION BIPOLAR CORD, M1000 TYPE: Brand: KIRWAN

## (undated) DEVICE — DRP C/ARMOR

## (undated) DEVICE — SUT ETHLN 3/0 FS1 30IN 669H

## (undated) DEVICE — SUT VIC 3/0 PS2 27IN J427H

## (undated) DEVICE — 3M™ STERI-STRIP™ REINFORCED ADHESIVE SKIN CLOSURES, R1547, 1/2 IN X 4 IN (12 MM X 100 MM), 6 STRIPS/ENVELOPE: Brand: 3M™ STERI-STRIP™